# Patient Record
Sex: MALE | Race: WHITE | NOT HISPANIC OR LATINO | Employment: FULL TIME | URBAN - METROPOLITAN AREA
[De-identification: names, ages, dates, MRNs, and addresses within clinical notes are randomized per-mention and may not be internally consistent; named-entity substitution may affect disease eponyms.]

---

## 2023-03-13 ENCOUNTER — TELEPHONE (OUTPATIENT)
Dept: PRIMARY CARE | Facility: CLINIC | Age: 68
End: 2023-03-13

## 2023-03-13 DIAGNOSIS — M72.2 PLANTAR FASCIITIS: Primary | ICD-10-CM

## 2023-04-17 ENCOUNTER — HOSPITAL ENCOUNTER (OUTPATIENT)
Dept: DATA CONVERSION | Facility: HOSPITAL | Age: 68
End: 2023-04-17
Attending: ANESTHESIOLOGY | Admitting: ANESTHESIOLOGY
Payer: COMMERCIAL

## 2023-04-17 DIAGNOSIS — M17.11 UNILATERAL PRIMARY OSTEOARTHRITIS, RIGHT KNEE: ICD-10-CM

## 2023-04-17 DIAGNOSIS — M54.50 LOW BACK PAIN, UNSPECIFIED: ICD-10-CM

## 2023-04-17 DIAGNOSIS — M47.816 SPONDYLOSIS WITHOUT MYELOPATHY OR RADICULOPATHY, LUMBAR REGION: ICD-10-CM

## 2023-05-16 ENCOUNTER — AMBULATORY SURGICAL CENTER (OUTPATIENT)
Dept: URBAN - METROPOLITAN AREA SURGERY 5 | Facility: SURGERY | Age: 68
End: 2023-05-16
Payer: COMMERCIAL

## 2023-05-16 ENCOUNTER — OFFICE (OUTPATIENT)
Dept: URBAN - METROPOLITAN AREA PATHOLOGY 1 | Facility: PATHOLOGY | Age: 68
End: 2023-05-16
Payer: COMMERCIAL

## 2023-05-16 VITALS
SYSTOLIC BLOOD PRESSURE: 101 MMHG | DIASTOLIC BLOOD PRESSURE: 54 MMHG | OXYGEN SATURATION: 93 % | DIASTOLIC BLOOD PRESSURE: 90 MMHG | SYSTOLIC BLOOD PRESSURE: 118 MMHG | DIASTOLIC BLOOD PRESSURE: 72 MMHG | DIASTOLIC BLOOD PRESSURE: 80 MMHG | OXYGEN SATURATION: 100 % | HEART RATE: 70 BPM | DIASTOLIC BLOOD PRESSURE: 80 MMHG | DIASTOLIC BLOOD PRESSURE: 82 MMHG | SYSTOLIC BLOOD PRESSURE: 120 MMHG | HEART RATE: 75 BPM | OXYGEN SATURATION: 96 % | DIASTOLIC BLOOD PRESSURE: 72 MMHG | RESPIRATION RATE: 18 BRPM | HEART RATE: 68 BPM | DIASTOLIC BLOOD PRESSURE: 90 MMHG | OXYGEN SATURATION: 91 % | RESPIRATION RATE: 16 BRPM | WEIGHT: 205 LBS | OXYGEN SATURATION: 97 % | SYSTOLIC BLOOD PRESSURE: 117 MMHG | OXYGEN SATURATION: 95 % | HEART RATE: 76 BPM | RESPIRATION RATE: 15 BRPM | DIASTOLIC BLOOD PRESSURE: 56 MMHG | HEART RATE: 81 BPM | OXYGEN SATURATION: 97 % | HEART RATE: 79 BPM | HEART RATE: 70 BPM | OXYGEN SATURATION: 100 % | HEART RATE: 72 BPM | DIASTOLIC BLOOD PRESSURE: 79 MMHG | TEMPERATURE: 97.2 F | SYSTOLIC BLOOD PRESSURE: 71 MMHG | OXYGEN SATURATION: 96 % | HEART RATE: 75 BPM | DIASTOLIC BLOOD PRESSURE: 85 MMHG | OXYGEN SATURATION: 95 % | SYSTOLIC BLOOD PRESSURE: 101 MMHG | SYSTOLIC BLOOD PRESSURE: 115 MMHG | SYSTOLIC BLOOD PRESSURE: 120 MMHG | DIASTOLIC BLOOD PRESSURE: 82 MMHG | DIASTOLIC BLOOD PRESSURE: 79 MMHG | RESPIRATION RATE: 15 BRPM | TEMPERATURE: 97.2 F | DIASTOLIC BLOOD PRESSURE: 54 MMHG | OXYGEN SATURATION: 91 % | HEART RATE: 76 BPM | SYSTOLIC BLOOD PRESSURE: 118 MMHG | HEART RATE: 81 BPM | HEART RATE: 79 BPM | RESPIRATION RATE: 16 BRPM | HEART RATE: 72 BPM | SYSTOLIC BLOOD PRESSURE: 117 MMHG | RESPIRATION RATE: 18 BRPM | DIASTOLIC BLOOD PRESSURE: 85 MMHG | SYSTOLIC BLOOD PRESSURE: 115 MMHG | OXYGEN SATURATION: 94 % | SYSTOLIC BLOOD PRESSURE: 71 MMHG | DIASTOLIC BLOOD PRESSURE: 56 MMHG | OXYGEN SATURATION: 93 % | OXYGEN SATURATION: 94 % | HEART RATE: 68 BPM | WEIGHT: 205 LBS

## 2023-05-16 DIAGNOSIS — D12.3 BENIGN NEOPLASM OF TRANSVERSE COLON: ICD-10-CM

## 2023-05-16 DIAGNOSIS — D12.2 BENIGN NEOPLASM OF ASCENDING COLON: ICD-10-CM

## 2023-05-16 DIAGNOSIS — Z80.0 FAMILY HISTORY OF MALIGNANT NEOPLASM OF DIGESTIVE ORGANS: ICD-10-CM

## 2023-05-16 PROBLEM — K63.5 POLYP OF COLON: Status: ACTIVE | Noted: 2023-05-16

## 2023-05-16 PROCEDURE — 45385 COLONOSCOPY W/LESION REMOVAL: CPT | Mod: 33 | Performed by: INTERNAL MEDICINE

## 2023-05-16 PROCEDURE — 88305 TISSUE EXAM BY PATHOLOGIST: CPT | Performed by: PATHOLOGY

## 2023-05-19 LAB
PDF: PDF REPORT: (no result)
PDF: PDF REPORT: (no result)

## 2023-07-26 DIAGNOSIS — G47.00 INSOMNIA, UNSPECIFIED TYPE: Primary | ICD-10-CM

## 2023-07-26 DIAGNOSIS — M54.50 LOW BACK PAIN, UNSPECIFIED BACK PAIN LATERALITY, UNSPECIFIED CHRONICITY, UNSPECIFIED WHETHER SCIATICA PRESENT: Primary | ICD-10-CM

## 2023-07-26 RX ORDER — METHYLPREDNISOLONE 4 MG/1
TABLET ORAL
Qty: 21 TABLET | Refills: 0 | Status: SHIPPED | OUTPATIENT
Start: 2023-07-26 | End: 2023-07-26

## 2023-07-26 RX ORDER — ZOLPIDEM TARTRATE 10 MG/1
10 TABLET ORAL NIGHTLY PRN
COMMUNITY
Start: 2017-02-08 | End: 2023-07-26 | Stop reason: SDUPTHER

## 2023-07-26 RX ORDER — ZOLPIDEM TARTRATE 10 MG/1
10 TABLET ORAL NIGHTLY PRN
Qty: 30 TABLET | Refills: 3 | Status: SHIPPED | OUTPATIENT
Start: 2023-07-26 | End: 2023-07-26

## 2023-08-21 ENCOUNTER — APPOINTMENT (OUTPATIENT)
Dept: PRIMARY CARE | Facility: CLINIC | Age: 68
End: 2023-08-21
Payer: COMMERCIAL

## 2023-09-07 VITALS — HEIGHT: 73 IN | BODY MASS INDEX: 29.89 KG/M2

## 2023-09-14 ENCOUNTER — APPOINTMENT (OUTPATIENT)
Dept: PRIMARY CARE | Facility: CLINIC | Age: 68
End: 2023-09-14
Payer: MEDICARE

## 2023-09-23 LAB
BASOPHILS (10*3/UL) IN BLOOD BY AUTOMATED COUNT: 0.04 X10E9/L (ref 0–0.1)
BASOPHILS/100 LEUKOCYTES IN BLOOD BY AUTOMATED COUNT: 0.4 % (ref 0–2)
EOSINOPHILS (10*3/UL) IN BLOOD BY AUTOMATED COUNT: 0.14 X10E9/L (ref 0–0.7)
EOSINOPHILS/100 LEUKOCYTES IN BLOOD BY AUTOMATED COUNT: 1.5 % (ref 0–6)
ERYTHROCYTE DISTRIBUTION WIDTH (RATIO) BY AUTOMATED COUNT: 12.5 % (ref 11.5–14.5)
ERYTHROCYTE MEAN CORPUSCULAR HEMOGLOBIN CONCENTRATION (G/DL) BY AUTOMATED: 32.4 G/DL (ref 32–36)
ERYTHROCYTE MEAN CORPUSCULAR VOLUME (FL) BY AUTOMATED COUNT: 89 FL (ref 80–100)
ERYTHROCYTES (10*6/UL) IN BLOOD BY AUTOMATED COUNT: 5 X10E12/L (ref 4.5–5.9)
HEMATOCRIT (%) IN BLOOD BY AUTOMATED COUNT: 44.7 % (ref 41–52)
HEMOGLOBIN (G/DL) IN BLOOD: 14.5 G/DL (ref 13.5–17.5)
IMMATURE GRANULOCYTES/100 LEUKOCYTES IN BLOOD BY AUTOMATED COUNT: 0.4 % (ref 0–0.9)
LEUKOCYTES (10*3/UL) IN BLOOD BY AUTOMATED COUNT: 9.6 X10E9/L (ref 4.4–11.3)
LYMPHOCYTES (10*3/UL) IN BLOOD BY AUTOMATED COUNT: 2.37 X10E9/L (ref 1.2–4.8)
LYMPHOCYTES/100 LEUKOCYTES IN BLOOD BY AUTOMATED COUNT: 24.6 % (ref 13–44)
MONOCYTES (10*3/UL) IN BLOOD BY AUTOMATED COUNT: 0.64 X10E9/L (ref 0.1–1)
MONOCYTES/100 LEUKOCYTES IN BLOOD BY AUTOMATED COUNT: 6.6 % (ref 2–10)
NEUTROPHILS (10*3/UL) IN BLOOD BY AUTOMATED COUNT: 6.41 X10E9/L (ref 1.2–7.7)
NEUTROPHILS/100 LEUKOCYTES IN BLOOD BY AUTOMATED COUNT: 66.5 % (ref 40–80)
NRBC (PER 100 WBCS) BY AUTOMATED COUNT: 0 /100 WBC (ref 0–0)
PLATELETS (10*3/UL) IN BLOOD AUTOMATED COUNT: 188 X10E9/L (ref 150–450)

## 2023-10-05 ENCOUNTER — PHARMACY VISIT (OUTPATIENT)
Dept: PHARMACY | Facility: CLINIC | Age: 68
End: 2023-10-05
Payer: COMMERCIAL

## 2023-10-05 ENCOUNTER — OFFICE VISIT (OUTPATIENT)
Dept: PRIMARY CARE | Facility: CLINIC | Age: 68
End: 2023-10-05
Payer: COMMERCIAL

## 2023-10-05 VITALS
HEIGHT: 72 IN | SYSTOLIC BLOOD PRESSURE: 132 MMHG | DIASTOLIC BLOOD PRESSURE: 86 MMHG | WEIGHT: 230 LBS | BODY MASS INDEX: 31.15 KG/M2

## 2023-10-05 DIAGNOSIS — Z00.00 HEALTH CARE MAINTENANCE: Primary | ICD-10-CM

## 2023-10-05 DIAGNOSIS — R73.03 PRE-DIABETES: ICD-10-CM

## 2023-10-05 DIAGNOSIS — I10 PRIMARY HYPERTENSION: ICD-10-CM

## 2023-10-05 DIAGNOSIS — Z00.00 ENCOUNTER FOR PREVENTIVE HEALTH EXAMINATION: ICD-10-CM

## 2023-10-05 DIAGNOSIS — H61.22 IMPACTED CERUMEN OF LEFT EAR: ICD-10-CM

## 2023-10-05 DIAGNOSIS — Z00.00 HEALTHCARE MAINTENANCE: ICD-10-CM

## 2023-10-05 DIAGNOSIS — Z12.5 SCREENING FOR PROSTATE CANCER: ICD-10-CM

## 2023-10-05 DIAGNOSIS — G47.00 INSOMNIA, UNSPECIFIED TYPE: ICD-10-CM

## 2023-10-05 PROCEDURE — 3079F DIAST BP 80-89 MM HG: CPT | Performed by: STUDENT IN AN ORGANIZED HEALTH CARE EDUCATION/TRAINING PROGRAM

## 2023-10-05 PROCEDURE — RXMED WILLOW AMBULATORY MEDICATION CHARGE

## 2023-10-05 PROCEDURE — 69209 REMOVE IMPACTED EAR WAX UNI: CPT | Performed by: STUDENT IN AN ORGANIZED HEALTH CARE EDUCATION/TRAINING PROGRAM

## 2023-10-05 PROCEDURE — 1036F TOBACCO NON-USER: CPT | Performed by: STUDENT IN AN ORGANIZED HEALTH CARE EDUCATION/TRAINING PROGRAM

## 2023-10-05 PROCEDURE — 99397 PER PM REEVAL EST PAT 65+ YR: CPT | Performed by: STUDENT IN AN ORGANIZED HEALTH CARE EDUCATION/TRAINING PROGRAM

## 2023-10-05 PROCEDURE — 3075F SYST BP GE 130 - 139MM HG: CPT | Performed by: STUDENT IN AN ORGANIZED HEALTH CARE EDUCATION/TRAINING PROGRAM

## 2023-10-05 RX ORDER — HYDROCHLOROTHIAZIDE 12.5 MG/1
12.5 TABLET ORAL DAILY
Qty: 90 TABLET | Refills: 1 | Status: SHIPPED | OUTPATIENT
Start: 2023-10-05 | End: 2024-04-02

## 2023-10-05 RX ORDER — FLUTICASONE PROPIONATE 50 MCG
2 SPRAY, SUSPENSION (ML) NASAL DAILY
COMMUNITY
Start: 2015-05-22 | End: 2024-06-07 | Stop reason: SDUPTHER

## 2023-10-05 RX ORDER — ZOLPIDEM TARTRATE 10 MG/1
10 TABLET ORAL NIGHTLY PRN
Qty: 30 TABLET | Refills: 3 | Status: SHIPPED | OUTPATIENT
Start: 2023-10-05 | End: 2024-02-27 | Stop reason: SDUPTHER

## 2023-10-05 NOTE — PROGRESS NOTES
Subjective   Patient ID: Robbie Tovar is a 68 y.o. male who presents for Follow-up.    HPI   I have personally reviewed the [place] report for ROBBIE TOVAR. I have considered the risks of abuse, dependence, addiction and diversion.   Is the patient prescribed a combination of a benzodiazepine and opioid? No.   Last urine drug screening date/ordered today: 10/5/2022   Results of last screen: Results as expected.   Controlled Substance Agreement:   I have printed this form and reviewed each line item with the patient and the patient has verbalized understanding.   Date of the last Controlled Substance Agreement: 10/5/2022  SLEEP AIDS   What is the patient's goal of therapy? restful sleep.  Is this being achieved with current treatment? yes.  Activities of Daily Living:   Yes, it is my opinion that this patient is benefitting from sleep aid therapy .   Physical functioning: Same   Family relationships: Same   Social relationships: Same   Mood: Same   Sleep patterns: Same   Overall functioning: Same     Yearly physical.    He generally feels well.    Exercising regularly. Diet has not been as good recently due to [employer] EMR launch.    He gets mild intermittent swelling in his feet, interested in trying a diuretic for this.    He has decreased hearing in his left ear, thinks he has a cerumen impaction.  Review of Systems  12-point ROS reviewed and was negative unless otherwise noted in HPI.    Objective   /86   Ht 1.829 m (6')   Wt 104 kg (230 lb)   BMI 31.19 kg/m²     Physical Exam  GEN: conversant, NAD  HEENT: PERRL, EOMI, MMM, right TM clear, left TM occluded by cerumen  NECK: supple, no carotid bruits appreciated b/l  CV: S1, S2, RRR  PULM: CTAB  ABD: soft, NT, ND  NEURO: no new gross focal deficits  EXT: no sig LE edema  PSYCH: appropriate affect    Assessment/Plan       #HTN/leg swelling: start hydrochlorothiazide, dicussed SE profile. Check CMP 3 days after starting.     #Cerumen impaction: left. Successful  ear lavage performed today with subsequent intact TM. Patient tolerated procedure well. R/b/a were discussed with patient who opted to proceed with procedure.     #Insomnia: Continue Ambien. Patient says he has been stable for many years. He is resistant to stopping this medication or trying another medication. I did provide refill of #30 tabs with 3 refills. Controlled substance contract and UDS 10/2023. I personally reviewed OARRS.     #obesity: Encouraged continued efforts at , weight loss. Offered support.     #GERD: Takes PPI.     #IBS-D: uses bentyl PRN     #Pre-diabetes: weight loss, diet modifications, check A1c     #BCC: referral to dermatology for further management and interested in skin tag management.      #Health maintenance: He gets yearly flu shot. He declines TDaP. Pneumovax given 2022. Colonoscopy has been ordered, encouraged him to schedule. Advised Shingrix. Received COVID-19 vaccines and yearly flu shot.     RTC 3-6 mo

## 2023-10-08 ENCOUNTER — TRANSCRIBE ORDERS (OUTPATIENT)
Dept: PAIN MEDICINE | Facility: CLINIC | Age: 68
End: 2023-10-08
Payer: COMMERCIAL

## 2023-10-08 DIAGNOSIS — M47.816 LUMBAR SPONDYLOSIS: ICD-10-CM

## 2023-10-09 ENCOUNTER — PREP FOR PROCEDURE (OUTPATIENT)
Dept: PAIN MEDICINE | Facility: CLINIC | Age: 68
End: 2023-10-09

## 2023-10-09 ENCOUNTER — HOSPITAL ENCOUNTER (OUTPATIENT)
Dept: PAIN MEDICINE | Facility: CLINIC | Age: 68
Discharge: HOME | End: 2023-10-09
Payer: COMMERCIAL

## 2023-10-09 ENCOUNTER — APPOINTMENT (OUTPATIENT)
Dept: PAIN MEDICINE | Facility: CLINIC | Age: 68
End: 2023-10-09
Payer: COMMERCIAL

## 2023-10-09 ENCOUNTER — ANCILLARY PROCEDURE (OUTPATIENT)
Dept: RADIOLOGY | Facility: CLINIC | Age: 68
End: 2023-10-09
Payer: COMMERCIAL

## 2023-10-09 VITALS
OXYGEN SATURATION: 97 % | HEART RATE: 70 BPM | TEMPERATURE: 96.1 F | RESPIRATION RATE: 16 BRPM | DIASTOLIC BLOOD PRESSURE: 75 MMHG | SYSTOLIC BLOOD PRESSURE: 136 MMHG

## 2023-10-09 VITALS — RESPIRATION RATE: 18 BRPM | HEART RATE: 58 BPM | OXYGEN SATURATION: 98 %

## 2023-10-09 DIAGNOSIS — M47.816 LUMBAR SPONDYLOSIS: ICD-10-CM

## 2023-10-09 PROCEDURE — 2500000005 HC RX 250 GENERAL PHARMACY W/O HCPCS

## 2023-10-09 PROCEDURE — 7100000009 HC PHASE TWO TIME - INITIAL BASE CHARGE: Performed by: ANESTHESIOLOGY

## 2023-10-09 PROCEDURE — 64493 INJ PARAVERT F JNT L/S 1 LEV: CPT | Mod: LT

## 2023-10-09 PROCEDURE — 64493 INJ PARAVERT F JNT L/S 1 LEV: CPT | Performed by: ANESTHESIOLOGY

## 2023-10-09 PROCEDURE — 64494 INJ PARAVERT F JNT L/S 2 LEV: CPT | Performed by: ANESTHESIOLOGY

## 2023-10-09 PROCEDURE — 2500000004 HC RX 250 GENERAL PHARMACY W/ HCPCS (ALT 636 FOR OP/ED)

## 2023-10-09 PROCEDURE — 77003 FLUOROGUIDE FOR SPINE INJECT: CPT

## 2023-10-09 PROCEDURE — 7100000010 HC PHASE TWO TIME - EACH INCREMENTAL 1 MINUTE: Performed by: ANESTHESIOLOGY

## 2023-10-09 PROCEDURE — 64494 INJ PARAVERT F JNT L/S 2 LEV: CPT | Mod: LT

## 2023-10-09 RX ORDER — BUPIVACAINE HYDROCHLORIDE 7.5 MG/ML
INJECTION, SOLUTION EPIDURAL; RETROBULBAR
Status: COMPLETED
Start: 2023-10-09 | End: 2023-10-09

## 2023-10-09 RX ORDER — BUPIVACAINE HYDROCHLORIDE 7.5 MG/ML
INJECTION, SOLUTION EPIDURAL; RETROBULBAR
Status: DISPENSED
Start: 2023-10-09 | End: 2023-10-09

## 2023-10-09 RX ORDER — LIDOCAINE HYDROCHLORIDE 5 MG/ML
INJECTION, SOLUTION INFILTRATION; INTRAVENOUS
Status: COMPLETED
Start: 2023-10-09 | End: 2023-10-09

## 2023-10-09 RX ORDER — LIDOCAINE HYDROCHLORIDE 5 MG/ML
INJECTION, SOLUTION INFILTRATION; INTRAVENOUS
Status: DISPENSED
Start: 2023-10-09 | End: 2023-10-09

## 2023-10-09 RX ORDER — TRIAMCINOLONE ACETONIDE 40 MG/ML
INJECTION, SUSPENSION INTRA-ARTICULAR; INTRAMUSCULAR
Status: DISPENSED
Start: 2023-10-09 | End: 2023-10-09

## 2023-10-09 RX ORDER — TRIAMCINOLONE ACETONIDE 40 MG/ML
INJECTION, SUSPENSION INTRA-ARTICULAR; INTRAMUSCULAR
Status: COMPLETED
Start: 2023-10-09 | End: 2023-10-09

## 2023-10-09 RX ADMIN — TRIAMCINOLONE ACETONIDE 40 MG: 40 INJECTION, SUSPENSION INTRA-ARTICULAR; INTRAMUSCULAR at 14:28

## 2023-10-09 RX ADMIN — LIDOCAINE HYDROCHLORIDE 250 MG: 5 INJECTION, SOLUTION INFILTRATION at 14:28

## 2023-10-09 RX ADMIN — BUPIVACAINE HYDROCHLORIDE 75 MG: 7.5 INJECTION, SOLUTION EPIDURAL; RETROBULBAR at 14:28

## 2023-10-09 ASSESSMENT — COLUMBIA-SUICIDE SEVERITY RATING SCALE - C-SSRS
1. IN THE PAST MONTH, HAVE YOU WISHED YOU WERE DEAD OR WISHED YOU COULD GO TO SLEEP AND NOT WAKE UP?: NO
6. HAVE YOU EVER DONE ANYTHING, STARTED TO DO ANYTHING, OR PREPARED TO DO ANYTHING TO END YOUR LIFE?: NO
2. HAVE YOU ACTUALLY HAD ANY THOUGHTS OF KILLING YOURSELF?: NO

## 2023-10-09 ASSESSMENT — PAIN SCALES - GENERAL
PAINLEVEL_OUTOF10: 8
PAINLEVEL_OUTOF10: 0 - NO PAIN

## 2023-10-09 ASSESSMENT — PAIN DESCRIPTION - DESCRIPTORS: DESCRIPTORS: ACHING

## 2023-10-09 ASSESSMENT — PAIN - FUNCTIONAL ASSESSMENT: PAIN_FUNCTIONAL_ASSESSMENT: 0-10

## 2023-10-09 NOTE — OP NOTE
PROCEDURE: Left L4-5 and L5-S1 lumbar Facet Joint Injection    ESTIMATED BLOOD LOSS:  None.    COMPLICATIONS:  none    INDICATIONS: This  patient has a significant history of left lower back pain.  Physical exam and radiological findings correlate with the pre-operative diagnoses.  Because of these findings we have elected to proceed with Lumbar facet joint injection at the left L4-5 and L5-S1 facet    PROCEDURE: After sufficient consent was signed, the patient was brought to the Operating Room and was placed in the prone position with a pillow underneath the hips. The back was prepped and draped in the usual sterile fashion.     Under fluoroscopic guidance the L4-5 interspace was identified. On an oblique view, the left L4-5 facet joint was identified. The skin at the entry site was infiltrated with 2% Lidocaine with Bicarb using a Size #25-gauge needle. A Size #22-gauge 3.5 inch spinal needle was introduced gradually until the joint was encountered. The needle was advanced into the facet joint. Next, 0.5 cc of Omnipaque 300 was injected and showed excellent distribution of the dye into the joint.  At that time, 20 mg of Kenalog plus 1 cc of  0.75% Bupivacaine were injected. The needle was flushed and removed.      The same procedure technique and medication dosages were repeated at the left L5-S1 joints.     The patient tolerated the procedure very well and was transferred to the Recovery Room in stable condition.  The patient had excellent resolution of symptoms.  Patient to follow-up in the Pain Management Clinic as scheduled.

## 2023-10-23 DIAGNOSIS — M43.06 LUMBAR SPONDYLOLYSIS: Primary | ICD-10-CM

## 2023-10-31 DIAGNOSIS — M54.16 LUMBAR RADICULOPATHY: ICD-10-CM

## 2023-10-31 DIAGNOSIS — M47.817 LUMBOSACRAL SPONDYLOSIS WITHOUT MYELOPATHY: Primary | ICD-10-CM

## 2023-11-01 ENCOUNTER — ANCILLARY PROCEDURE (OUTPATIENT)
Dept: RADIOLOGY | Facility: CLINIC | Age: 68
End: 2023-11-01
Payer: COMMERCIAL

## 2023-11-01 DIAGNOSIS — M47.817 LUMBOSACRAL SPONDYLOSIS WITHOUT MYELOPATHY: ICD-10-CM

## 2023-11-01 DIAGNOSIS — M54.16 LUMBAR RADICULOPATHY: ICD-10-CM

## 2023-11-01 PROCEDURE — 72148 MRI LUMBAR SPINE W/O DYE: CPT

## 2023-11-01 PROCEDURE — 72148 MRI LUMBAR SPINE W/O DYE: CPT | Performed by: RADIOLOGY

## 2023-11-01 RX ORDER — HYDROMORPHONE HYDROCHLORIDE 1 MG/ML
INJECTION, SOLUTION INTRAMUSCULAR; INTRAVENOUS; SUBCUTANEOUS
Status: DISPENSED
Start: 2023-11-01 | End: 2023-11-01

## 2023-11-01 RX ORDER — MIDAZOLAM HYDROCHLORIDE 1 MG/ML
INJECTION INTRAMUSCULAR; INTRAVENOUS
Status: DISPENSED
Start: 2023-11-01 | End: 2023-11-01

## 2023-11-01 RX ORDER — BUPIVACAINE HYDROCHLORIDE 5 MG/ML
INJECTION, SOLUTION EPIDURAL; INTRACAUDAL
Status: DISPENSED
Start: 2023-11-01 | End: 2023-11-01

## 2023-11-01 RX ORDER — LIDOCAINE HYDROCHLORIDE 10 MG/ML
INJECTION INFILTRATION; PERINEURAL
Status: DISPENSED
Start: 2023-11-01 | End: 2023-11-01

## 2023-11-01 RX ORDER — MIDAZOLAM HYDROCHLORIDE 1 MG/ML
INJECTION INTRAMUSCULAR; INTRAVENOUS
Status: DISPENSED
Start: 2023-11-01 | End: 2023-11-02

## 2023-11-01 RX ORDER — HYDROMORPHONE HYDROCHLORIDE 1 MG/ML
INJECTION, SOLUTION INTRAMUSCULAR; INTRAVENOUS; SUBCUTANEOUS
Status: DISPENSED
Start: 2023-11-01 | End: 2023-11-02

## 2023-11-09 ENCOUNTER — PHARMACY VISIT (OUTPATIENT)
Dept: PHARMACY | Facility: CLINIC | Age: 68
End: 2023-11-09
Payer: COMMERCIAL

## 2023-11-09 PROCEDURE — RXMED WILLOW AMBULATORY MEDICATION CHARGE

## 2023-11-11 PROBLEM — M54.12 CERVICAL RADICULOPATHY: Status: ACTIVE | Noted: 2023-11-11

## 2023-11-11 PROBLEM — L82.1 OTHER SEBORRHEIC KERATOSIS: Status: ACTIVE | Noted: 2023-01-11

## 2023-11-11 PROBLEM — J34.89 NASAL CRUSTING: Status: ACTIVE | Noted: 2023-11-11

## 2023-11-11 PROBLEM — R23.3 PETECHIAE: Status: ACTIVE | Noted: 2023-11-11

## 2023-11-11 PROBLEM — G47.00 INSOMNIA: Status: ACTIVE | Noted: 2023-11-11

## 2023-11-11 PROBLEM — M47.816 LUMBAR SPONDYLOSIS: Status: ACTIVE | Noted: 2023-11-11

## 2023-11-11 PROBLEM — M17.12 ARTHRITIS OF KNEE, LEFT: Status: ACTIVE | Noted: 2023-11-11

## 2023-11-11 PROBLEM — K13.0 DISEASES OF LIPS: Status: ACTIVE | Noted: 2023-01-11

## 2023-11-11 PROBLEM — Z96.651 PRESENCE OF RIGHT ARTIFICIAL KNEE JOINT: Status: ACTIVE | Noted: 2021-06-24

## 2023-11-11 PROBLEM — J30.9 ALLERGIC RHINITIS: Status: ACTIVE | Noted: 2023-11-11

## 2023-11-11 PROBLEM — M25.661 DECREASED RANGE OF MOTION OF RIGHT KNEE: Status: ACTIVE | Noted: 2023-11-11

## 2023-11-11 PROBLEM — E55.9 VITAMIN D DEFICIENCY: Status: ACTIVE | Noted: 2023-11-11

## 2023-11-11 PROBLEM — R43.0 LOSS OF SMELL: Status: ACTIVE | Noted: 2023-11-11

## 2023-11-11 PROBLEM — L91.8 OTHER HYPERTROPHIC DISORDERS OF THE SKIN: Status: ACTIVE | Noted: 2023-01-11

## 2023-11-11 PROBLEM — I10 BENIGN ESSENTIAL HTN: Status: ACTIVE | Noted: 2023-11-11

## 2023-11-11 PROBLEM — Z74.09 IMPAIRED FUNCTIONAL MOBILITY, BALANCE, AND ENDURANCE: Status: ACTIVE | Noted: 2023-11-11

## 2023-11-11 PROBLEM — C44.519 BASAL CELL CARCINOMA OF SKIN OF OTHER PART OF TRUNK: Status: ACTIVE | Noted: 2023-01-11

## 2023-11-11 PROBLEM — Z85.828 PERSONAL HISTORY OF OTHER MALIGNANT NEOPLASM OF SKIN: Status: ACTIVE | Noted: 2023-01-11

## 2023-11-11 PROBLEM — L82.0 KERATOSIS, INFLAMED SEBORRHEIC: Status: ACTIVE | Noted: 2023-11-11

## 2023-11-11 PROBLEM — I89.0 LYMPHEDEMA OF LEG: Status: ACTIVE | Noted: 2023-11-11

## 2023-11-11 PROBLEM — M72.2 PLANTAR FASCIITIS: Status: ACTIVE | Noted: 2023-11-11

## 2023-11-11 PROBLEM — M17.11 ARTHRITIS OF KNEE, RIGHT: Status: ACTIVE | Noted: 2023-11-11

## 2023-11-11 PROBLEM — J32.9 CHRONIC SINUSITIS: Status: ACTIVE | Noted: 2023-11-11

## 2023-11-11 PROBLEM — R44.8 FACIAL PRESSURE: Status: ACTIVE | Noted: 2023-11-11

## 2023-11-11 PROBLEM — T84.82XS ARTHROFIBROSIS OF TOTAL KNEE ARTHROPLASTY, SEQUELA: Status: ACTIVE | Noted: 2023-11-11

## 2023-11-11 PROBLEM — M54.2 CERVICALGIA: Status: ACTIVE | Noted: 2023-11-11

## 2023-11-11 PROBLEM — D48.5 NEOPLASM OF UNCERTAIN BEHAVIOR OF SKIN: Status: ACTIVE | Noted: 2023-01-11

## 2023-11-11 PROBLEM — M54.50 CHRONIC LOW BACK PAIN: Status: ACTIVE | Noted: 2023-11-11

## 2023-11-11 PROBLEM — J34.89 NASAL DRAINAGE: Status: ACTIVE | Noted: 2023-11-11

## 2023-11-11 PROBLEM — R73.03 PRE-DIABETES: Status: ACTIVE | Noted: 2023-11-11

## 2023-11-11 PROBLEM — R20.2 PARESTHESIA: Status: ACTIVE | Noted: 2023-11-11

## 2023-11-11 PROBLEM — M17.9 OA (OSTEOARTHRITIS) OF KNEE: Status: ACTIVE | Noted: 2023-11-11

## 2023-11-11 PROBLEM — M54.41 CHRONIC MIDLINE LOW BACK PAIN WITH RIGHT-SIDED SCIATICA: Status: ACTIVE | Noted: 2023-11-11

## 2023-11-11 PROBLEM — E66.9 OBESITY: Status: ACTIVE | Noted: 2023-11-11

## 2023-11-11 PROBLEM — G89.29 CHRONIC LOW BACK PAIN: Status: ACTIVE | Noted: 2023-11-11

## 2023-11-11 PROBLEM — K21.9 GERD (GASTROESOPHAGEAL REFLUX DISEASE): Status: ACTIVE | Noted: 2023-11-11

## 2023-11-11 PROBLEM — B36.0 PITYRIASIS VERSICOLOR: Status: ACTIVE | Noted: 2023-01-11

## 2023-11-11 PROBLEM — J30.2 SEASONAL ALLERGIES: Status: ACTIVE | Noted: 2023-11-11

## 2023-11-11 PROBLEM — Z79.01 LONG TERM (CURRENT) USE OF ANTICOAGULANTS: Status: ACTIVE | Noted: 2021-06-24

## 2023-11-11 PROBLEM — E66.3 OVERWEIGHT: Status: ACTIVE | Noted: 2023-11-11

## 2023-11-11 RX ORDER — MELOXICAM 15 MG/1
15 TABLET ORAL
COMMUNITY
Start: 2021-09-08 | End: 2024-03-07 | Stop reason: WASHOUT

## 2023-11-11 RX ORDER — KETOROLAC TROMETHAMINE 10 MG/1
10 TABLET, FILM COATED ORAL 3 TIMES DAILY
COMMUNITY
Start: 2023-09-14 | End: 2024-03-07 | Stop reason: WASHOUT

## 2023-11-11 RX ORDER — PREDNISONE 10 MG/1
10 TABLET ORAL
COMMUNITY
Start: 2021-01-22 | End: 2024-03-07 | Stop reason: WASHOUT

## 2023-11-11 RX ORDER — METHOCARBAMOL 750 MG/1
750 TABLET, FILM COATED ORAL
COMMUNITY
Start: 2022-12-06 | End: 2024-03-07 | Stop reason: WASHOUT

## 2023-11-11 RX ORDER — CELECOXIB 200 MG/1
1 CAPSULE ORAL 2 TIMES DAILY PRN
COMMUNITY
Start: 2017-07-24 | End: 2023-11-30 | Stop reason: SDUPTHER

## 2023-11-11 RX ORDER — MONTELUKAST SODIUM 10 MG/1
10 TABLET ORAL
COMMUNITY
Start: 2014-12-02 | End: 2024-03-07 | Stop reason: WASHOUT

## 2023-11-11 RX ORDER — OXYCODONE HCL 10 MG/1
10 TABLET, FILM COATED, EXTENDED RELEASE ORAL EVERY 12 HOURS
COMMUNITY
Start: 2023-02-21 | End: 2024-03-07 | Stop reason: WASHOUT

## 2023-11-11 RX ORDER — AMLODIPINE BESYLATE 2.5 MG/1
2.5 TABLET ORAL
COMMUNITY
Start: 2021-06-18 | End: 2024-03-07 | Stop reason: WASHOUT

## 2023-11-11 RX ORDER — OXYCODONE HYDROCHLORIDE 5 MG/1
5 TABLET ORAL
COMMUNITY
Start: 2021-08-15 | End: 2024-03-07 | Stop reason: WASHOUT

## 2023-11-11 RX ORDER — PANTOPRAZOLE SODIUM 40 MG/1
40 TABLET, DELAYED RELEASE ORAL EVERY 24 HOURS
COMMUNITY
Start: 2022-06-23 | End: 2024-02-20 | Stop reason: SDUPTHER

## 2023-11-11 RX ORDER — METHYLPREDNISOLONE 4 MG/1
4 TABLET ORAL
COMMUNITY
Start: 2023-03-13 | End: 2024-03-07 | Stop reason: WASHOUT

## 2023-11-11 RX ORDER — ZINC GLUCONATE 50 MG
50 TABLET ORAL
COMMUNITY
Start: 2020-12-22 | End: 2024-03-07 | Stop reason: WASHOUT

## 2023-11-11 RX ORDER — OXYCODONE AND ACETAMINOPHEN 5; 325 MG/1; MG/1
TABLET ORAL
COMMUNITY
Start: 2022-11-07 | End: 2024-03-07 | Stop reason: WASHOUT

## 2023-11-11 RX ORDER — KETOCONAZOLE 20 MG/G
CREAM TOPICAL
COMMUNITY
Start: 2021-11-11 | End: 2024-03-07 | Stop reason: WASHOUT

## 2023-11-13 ENCOUNTER — ANCILLARY PROCEDURE (OUTPATIENT)
Dept: RADIOLOGY | Facility: CLINIC | Age: 68
End: 2023-11-13
Payer: COMMERCIAL

## 2023-11-13 ENCOUNTER — HOSPITAL ENCOUNTER (OUTPATIENT)
Dept: PAIN MEDICINE | Facility: CLINIC | Age: 68
Discharge: HOME | End: 2023-11-13
Payer: COMMERCIAL

## 2023-11-13 VITALS
DIASTOLIC BLOOD PRESSURE: 94 MMHG | SYSTOLIC BLOOD PRESSURE: 159 MMHG | OXYGEN SATURATION: 97 % | RESPIRATION RATE: 16 BRPM | HEART RATE: 84 BPM | TEMPERATURE: 96.4 F

## 2023-11-13 DIAGNOSIS — M47.816 LUMBAR SPONDYLOSIS: Primary | ICD-10-CM

## 2023-11-13 DIAGNOSIS — M43.06 LUMBAR SPONDYLOLYSIS: ICD-10-CM

## 2023-11-13 PROCEDURE — 64494 INJ PARAVERT F JNT L/S 2 LEV: CPT | Performed by: ANESTHESIOLOGY

## 2023-11-13 PROCEDURE — 64493 INJ PARAVERT F JNT L/S 1 LEV: CPT | Performed by: ANESTHESIOLOGY

## 2023-11-13 PROCEDURE — 2500000005 HC RX 250 GENERAL PHARMACY W/O HCPCS

## 2023-11-13 PROCEDURE — 64493 INJ PARAVERT F JNT L/S 1 LEV: CPT | Mod: 50 | Performed by: ANESTHESIOLOGY

## 2023-11-13 PROCEDURE — 77003 FLUOROGUIDE FOR SPINE INJECT: CPT

## 2023-11-13 PROCEDURE — 2500000004 HC RX 250 GENERAL PHARMACY W/ HCPCS (ALT 636 FOR OP/ED)

## 2023-11-13 RX ORDER — BUPIVACAINE HYDROCHLORIDE 7.5 MG/ML
INJECTION, SOLUTION EPIDURAL; RETROBULBAR
Status: COMPLETED
Start: 2023-11-13 | End: 2023-11-13

## 2023-11-13 RX ORDER — TRIAMCINOLONE ACETONIDE 40 MG/ML
INJECTION, SUSPENSION INTRA-ARTICULAR; INTRAMUSCULAR
Status: COMPLETED
Start: 2023-11-13 | End: 2023-11-13

## 2023-11-13 RX ORDER — LIDOCAINE HYDROCHLORIDE 5 MG/ML
INJECTION, SOLUTION INFILTRATION; INTRAVENOUS
Status: COMPLETED
Start: 2023-11-13 | End: 2023-11-13

## 2023-11-13 RX ADMIN — LIDOCAINE HYDROCHLORIDE 250 MG: 5 INJECTION, SOLUTION INFILTRATION at 08:48

## 2023-11-13 RX ADMIN — TRIAMCINOLONE ACETONIDE 80 MG: 40 INJECTION, SUSPENSION INTRA-ARTICULAR; INTRAMUSCULAR at 08:48

## 2023-11-13 RX ADMIN — BUPIVACAINE HYDROCHLORIDE 75 MG: 7.5 INJECTION, SOLUTION EPIDURAL; RETROBULBAR at 08:49

## 2023-11-13 ASSESSMENT — PAIN SCALES - GENERAL: PAINLEVEL_OUTOF10: 5 - MODERATE PAIN

## 2023-11-13 ASSESSMENT — PAIN - FUNCTIONAL ASSESSMENT: PAIN_FUNCTIONAL_ASSESSMENT: 0-10

## 2023-11-13 ASSESSMENT — COLUMBIA-SUICIDE SEVERITY RATING SCALE - C-SSRS
1. IN THE PAST MONTH, HAVE YOU WISHED YOU WERE DEAD OR WISHED YOU COULD GO TO SLEEP AND NOT WAKE UP?: NO
2. HAVE YOU ACTUALLY HAD ANY THOUGHTS OF KILLING YOURSELF?: NO
6. HAVE YOU EVER DONE ANYTHING, STARTED TO DO ANYTHING, OR PREPARED TO DO ANYTHING TO END YOUR LIFE?: NO

## 2023-11-13 ASSESSMENT — ENCOUNTER SYMPTOMS
ENDOCRINE NEGATIVE: 1
PSYCHIATRIC NEGATIVE: 1
ARTHRALGIAS: 1
RESPIRATORY NEGATIVE: 1
CARDIOVASCULAR NEGATIVE: 1
BACK PAIN: 1
HEMATOLOGIC/LYMPHATIC NEGATIVE: 1
GASTROINTESTINAL NEGATIVE: 1
EYES NEGATIVE: 1

## 2023-11-13 NOTE — H&P
History Of Present Illness  Robbie Tovar is a 68 y.o. male presenting with LBP no changes since 9/14/2023.     Past Medical History  Past Medical History:   Diagnosis Date    Chronic sinusitis, unspecified 09/21/2016    Clinical sinusitis    Plantar fascial fibromatosis 09/21/2016    Plantar fasciitis    Primary insomnia 09/21/2016    Primary insomnia       Surgical History  Past Surgical History:   Procedure Laterality Date    OTHER SURGICAL HISTORY  05/18/2021    Nasal septoplasty    OTHER SURGICAL HISTORY  05/18/2021    Tonsillectomy with adenoidectomy    OTHER SURGICAL HISTORY  05/12/2020    Hernia repair        Social History  He reports that he has never smoked. He has never used smokeless tobacco. He reports that he does not drink alcohol and does not use drugs.    Family History  Family History   Problem Relation Name Age of Onset    Allergies Mother      Other (cardiac disorder) Mother          stent 70s    Hypertension Mother      Hypertension Father      Allergies Sister          Allergies  Iodinated contrast media    Review of Systems   HENT: Negative.     Eyes: Negative.    Respiratory: Negative.     Cardiovascular: Negative.    Gastrointestinal: Negative.    Endocrine: Negative.    Genitourinary: Negative.    Musculoskeletal:  Positive for arthralgias and back pain.   Skin: Negative.    Hematological: Negative.    Psychiatric/Behavioral: Negative.          Physical Exam  Vitals and nursing note reviewed.   Constitutional:       Appearance: Normal appearance.   HENT:      Head: Normocephalic and atraumatic.      Nose: Nose normal.   Eyes:      Extraocular Movements: Extraocular movements intact.      Conjunctiva/sclera: Conjunctivae normal.      Pupils: Pupils are equal, round, and reactive to light.   Cardiovascular:      Rate and Rhythm: Normal rate and regular rhythm.      Pulses: Normal pulses.      Heart sounds: Normal heart sounds.   Pulmonary:      Effort: Pulmonary effort is normal.      Breath  sounds: Normal breath sounds.   Abdominal:      General: Abdomen is flat. Bowel sounds are normal.      Palpations: Abdomen is soft.   Musculoskeletal:         General: Tenderness present.   Skin:     General: Skin is warm.   Neurological:      Mental Status: He is alert.   Psychiatric:         Mood and Affect: Mood normal.         Behavior: Behavior normal.          Last Recorded Vitals  Blood pressure (!) 159/94, pulse 84, temperature 35.8 °C (96.4 °F), resp. rate 16, SpO2 97 %.    Relevant Results             Assessment/Plan   Active Problems:  There are no active Hospital Problems.      Plan for bilateral L3-5 MBB         Natalie Monzon MD

## 2023-11-13 NOTE — Clinical Note
Prepped with ChloraPrep, a minimum of 3 minute dry time, longer if needed, no pooling noted, patient draped in sterile fashion. Mid to lower back

## 2023-11-13 NOTE — OP NOTE
Patient has no changes in health medical management or allergies since last visit on 9/14/2023    PRE-OPERATIVE DIAGNOSIS: Lumbar spondylosis  POST-OPERATIVE DIAGNOSIS:  Same.    PROCEDURE:  bilateral L3, L4 and [id number] medial Branch Block     ESTIMATED BLOOD LOSS:  None.    COMPLICATIONS:  None    CLINICAL FINDINGS:  The patient is a pleasant 68 y.o.  male with significant history of bilateral lower back pain.  Clinical exam and radiological finding correlate with the pre-operative diagnoses.  Because of these findings we have elected to proceed with medial branch block at the affected levels.    DESCRIPTION OF PROCEDURE: After sufficient consent was signed, the patient was brought to the Operating Room, placed in the Prone/Supine position with the neck neutral with a pillow underneath the hips. The lower back was prepped and draped in the usual fashion.     Under fluoroscopic guidance, the right  L3 level was identified.  Utilizing the oblique view the medial branch area between the junction of the transverse process and superior articular process was identified. The skin was infiltrated with lidocaine, 0.5% using size 27-gauge needle. Then Spinal needle 22-gauge, 3.5 inchwas introduced gradually until the right  L3 the site of the medial branch was encountered; 0.5 cc of  bupivacaine, 0.75% + Kenalog 10 mg was injected.     The same procedure technique and medication dosages were repeated at the bilateral  L3, L4, and L5  level(s).     The patient tolerated the procedure very well and was transferred to the Recovery Room in stable condition.  The patient had stable resolution of symptoms.  Patient to follow-up in the Pain Management Clinic as scheduled.

## 2023-11-30 ENCOUNTER — PHARMACY VISIT (OUTPATIENT)
Dept: PHARMACY | Facility: CLINIC | Age: 68
End: 2023-11-30
Payer: COMMERCIAL

## 2023-11-30 DIAGNOSIS — M17.12 ARTHRITIS OF KNEE, LEFT: Primary | ICD-10-CM

## 2023-11-30 PROCEDURE — RXMED WILLOW AMBULATORY MEDICATION CHARGE

## 2023-11-30 RX ORDER — CELECOXIB 200 MG/1
200 CAPSULE ORAL 2 TIMES DAILY PRN
Qty: 60 CAPSULE | Refills: 2 | Status: SHIPPED | OUTPATIENT
Start: 2023-11-30 | End: 2024-02-27 | Stop reason: SDUPTHER

## 2024-01-03 ENCOUNTER — TELEPHONE (OUTPATIENT)
Dept: PRIMARY CARE | Facility: CLINIC | Age: 69
End: 2024-01-03
Payer: COMMERCIAL

## 2024-01-03 DIAGNOSIS — R10.9 ABDOMINAL DISCOMFORT: Primary | ICD-10-CM

## 2024-01-03 PROCEDURE — RXMED WILLOW AMBULATORY MEDICATION CHARGE

## 2024-01-03 RX ORDER — DICYCLOMINE HYDROCHLORIDE 10 MG/1
10 CAPSULE ORAL 4 TIMES DAILY PRN
Qty: 30 CAPSULE | Refills: 1 | Status: SHIPPED | OUTPATIENT
Start: 2024-01-03 | End: 2024-03-03

## 2024-01-04 ENCOUNTER — PHARMACY VISIT (OUTPATIENT)
Dept: PHARMACY | Facility: CLINIC | Age: 69
End: 2024-01-04
Payer: COMMERCIAL

## 2024-01-24 ENCOUNTER — OFFICE VISIT (OUTPATIENT)
Dept: PAIN MEDICINE | Facility: HOSPITAL | Age: 69
End: 2024-01-24
Payer: COMMERCIAL

## 2024-01-24 DIAGNOSIS — M47.816 LUMBAR SPONDYLOSIS: Primary | ICD-10-CM

## 2024-01-24 DIAGNOSIS — M17.12 PRIMARY OSTEOARTHRITIS OF LEFT KNEE: ICD-10-CM

## 2024-01-24 DIAGNOSIS — M46.1 SACROILIITIS (CMS-HCC): ICD-10-CM

## 2024-01-24 DIAGNOSIS — M21.70 ACQUIRED LEG LENGTH DISCREPANCY: ICD-10-CM

## 2024-01-24 PROCEDURE — 1036F TOBACCO NON-USER: CPT | Performed by: ANESTHESIOLOGY

## 2024-01-24 PROCEDURE — 20610 DRAIN/INJ JOINT/BURSA W/O US: CPT | Performed by: ANESTHESIOLOGY

## 2024-01-24 PROCEDURE — 99213 OFFICE O/P EST LOW 20 MIN: CPT | Performed by: ANESTHESIOLOGY

## 2024-01-24 PROCEDURE — 20610 DRAIN/INJ JOINT/BURSA W/O US: CPT | Mod: LT | Performed by: ANESTHESIOLOGY

## 2024-01-24 PROCEDURE — 1125F AMNT PAIN NOTED PAIN PRSNT: CPT | Performed by: ANESTHESIOLOGY

## 2024-01-24 PROCEDURE — 20610 DRAIN/INJ JOINT/BURSA W/O US: CPT | Performed by: STUDENT IN AN ORGANIZED HEALTH CARE EDUCATION/TRAINING PROGRAM

## 2024-01-24 PROCEDURE — 99203 OFFICE O/P NEW LOW 30 MIN: CPT | Performed by: ANESTHESIOLOGY

## 2024-01-24 ASSESSMENT — PAIN SCALES - GENERAL: PAINLEVEL_OUTOF10: 6

## 2024-01-24 ASSESSMENT — PAIN - FUNCTIONAL ASSESSMENT: PAIN_FUNCTIONAL_ASSESSMENT: 0-10

## 2024-01-24 NOTE — PROGRESS NOTES
History Of Present Illness  Robbie Tovar is a 68 y.o. male with a past medical history of hypertension, obesity, GERD, prediabetes presents to pain clinic for continued management of low back pain.  Patient normally follows with Dr. Shirley at which point lumbar medial branch blocks were done, he states he had greater than 50% relief of his pain with these injections, however he never had the radiofrequency ablation done.   He states he has low back pain primarily with prolonged sitting or standing.  He does not necessarily have pain with walking.  He does endorse a burning pain that, most on the lateral aspect of his thigh and around his knee.  But is not correlated with walking.  His MRI does show multilevel spondylosis with varying degrees of spinal canal neuroforaminal narrowing.  There are degenerative facet changes as well as flavum hypertrophy contributing to mild canal stenosis at the L4-5 level.  There are mild chronic compressive changes along the L3 vertebral body.  He is not taking any neuromodulating medications. The pain causes significant stress in the patient's life, specifically interferes with general activity, mood, walking ability, ability to perform tasks at home and/or work.  Patient participates in physical therapy and continues to perform physician directed exercises at home. Denies any bowel or bladder incontinence, saddle anesthesia, worsening pain, weakness or falls.     Past Medical History  He has a past medical history of Chronic sinusitis, unspecified (09/21/2016), Plantar fascial fibromatosis (09/21/2016), and Primary insomnia (09/21/2016).    Surgical History  He has a past surgical history that includes Other surgical history (05/18/2021); Other surgical history (05/18/2021); and Other surgical history (05/12/2020).     Social History  He reports that he has never smoked. He has never used smokeless tobacco. He reports that he does not drink alcohol and does not use drugs.    Family  History  Family History   Problem Relation Name Age of Onset    Allergies Mother      Other (cardiac disorder) Mother          stent 70s    Hypertension Mother      Hypertension Father      Allergies Sister          Allergies  Iodinated contrast media    Review of Symptoms:   Constitutional: Negative for chills, diaphoresis or fever  HENT: Negative for neck swelling  Eyes:.  Negative for eye pain  Respiratory:.  Negative for cough, shortness of breath or wheezing    Cardiovascular:.  Negative for chest pain or palpitations  Gastrointestinal:.  Negative for abdominal pain, nausea and vomiting  Genitourinary:.  Negative for urgency  Musculoskeletal:  Positive for back pain. Positive for joint pain. Denies falls within the past 3 months.  Skin: Negative for wounds or itching   Neurological: Negative for dizziness, seizures, loss of consciousness and weakness  Endo/Heme/Allergies: Does not bruise/bleed easily  Psychiatric/Behavioral: Negative for depression. The patient does not appear anxious.       PHYSICAL EXAM  Vitals signs reviewed  Constitutional:       General: Not in acute distress     Appearance: Normal appearance. Not ill-appearing.  HENT:     Head: Normocephalic and atraumatic  Eyes:     Conjunctiva/sclera: Conjunctivae normal  Cardiovascular:     Rate and Rhythm: Normal rate and regular rhythm  Pulmonary:     Effort: No respiratory distress  Abdominal:     Palpations: Abdomen is soft  Musculoskeletal: RANGEL  Skin:     General: Skin is warm and dry  Neurological:     General: No focal deficit present  Psychiatric:         Mood and Affect: Mood normal         Behavior: Behavior normal    Advanced Exam   Inspection: No gross deformities, no surgical scars, leg length discrepancy noted   Palpation: No tenderness of patient of lumbar midline, lumbar paraspinals, tender over left SI joint  ROM: Normal range of motion of the lumbar flexion extension, limited range of motion with right knee replacement  Motor: 5-5  strength upper and lower extremities  Sensory: Negative for sensory abnormalities in upper and lower extremities  Reflexes: 2+ reflexes bilateral upper and lower extremities  Lumbar: Negative straight leg raising bilaterally, negative for facet loading  Sacral: Positive Janet, positive Gaenslen's on left  Hip: Negative for pain with anterior, lateral, posterior palpation of hip joints, negative FADIR, negative for internal/external rotation of the hip, negative logroll     Last Recorded Vitals  There were no vitals taken for this visit.    Relevant Results  Current Outpatient Medications   Medication Instructions    amLODIPine (NORVASC) 2.5 mg, oral, Daily RT    celecoxib (CeleBREX) 200 mg capsule Take 1 capsule (200 mg) by mouth 2 times a day as needed for mild pain (1 - 3)    ciclopirox (Loprox) 0.77 % cream APPLY 1 APPLICATION TO AFFECTED AREA TWICE DAILY.    dicyclomine (BENTYL) 10 mg, oral, 4 times daily PRN    fluticasone (Flonase) 50 mcg/actuation nasal spray 2 sprays, Each Nostril, Daily    hydroCHLOROthiazide (HYDRODIURIL) 12.5 mg, oral, Daily    hydrocortisone 2.5 % cream APPLY TO AFFECTED AREA(S) OF ANGLES OF MOUTH (MIXED WITH KETOCONAZOLE CREAM) TWO TIMES A DAY FOR 2 WEEKS. REPEAT AS NEEDED FLARE    ketoconazole (NIZOral) 2 % cream Topical    ketorolac (TORADOL) 10 mg, oral, 3 times daily    meloxicam (MOBIC) 15 mg, oral    methocarbamol (ROBAXIN) 750 mg, oral    methylPREDNISolone (MEDROL DOSPAK) 4 mg, oral    montelukast (SINGULAIR) 10 mg, oral    oxyCODONE (ROXICODONE) 5 mg, oral    oxyCODONE ER (OXYCONTIN) 10 mg, oral, Every 12 hours    oxyCODONE-acetaminophen (Percocet) 5-325 mg tablet oral    pantoprazole (PROTONIX) 40 mg, oral, Every 24 hours    predniSONE (DELTASONE) 10 mg, oral    zinc gluconate 50 mg, oral, Daily RT    zolpidem (Ambien) 10 mg tablet TAKE 1 TABLET (10 MG) BY MOUTH AS NEEDED AT BEDTIME FOR SLEEP.         MR lumbar spine wo IV contrast 11/01/2023    Narrative  Interpreted By:   Godwin London,  STUDY:  MR LUMBAR SPINE WO IV CONTRAST;  11/1/2023 10:24 am    INDICATION:  Signs/Symptoms:Progressive lower back pain with now some radiation to  extremities elevation for possible interventional therapies surgery.    COMPARISON:  None.    ACCESSION NUMBER(S):  [id number]    ORDERING CLINICIAN:  DALTON SIU    TECHNIQUE:  Sagittal STIR, sagittal T2, sagittal T1, axial T2, axial T1 weighted  MRI images of the lumbar spine were obtained without intravenous  contrast administration.    FINDINGS:  The coronal  images demonstrate a mild dextrocurvature of the  lumbar spine.    There are mild chronic compressive changes involving the L3 vertebral  body.    There is a Schmorl's node noted along the superior endplate of the L1  vertebral body.    There is a large hemangioma within the L3 vertebral body. There is a  27 mm hemangioma within the L1 vertebral body.    There are mild degenerative signal changes noted along endplates  within lumbar region.    The visualized spinal cord demonstrates no signal abnormality within  it.  The conus medullaris is normally positioned terminating at the  L1/2 level.    There is a component of congenital narrowing the spinal canal within  the lower lumbar region.    There is diminished disc signal at the L5/S1 and to a lesser degree  L2/3 levels compatible with degenerative disc disease.    At the L5/S1 level,  there is a mild posterior disc bulge along with  degenerative facet changes contribute to mild spinal canal narrowing.  There is mild encroachment upon the neural foramen bilaterally.    At the L4/L5 level,  there is a mild posterior disc bulge along with  degenerative facet changes and ligamentum flavum hypertrophy  contributing to mild spinal canal narrowing. There is mild  encroachment upon the neural foramen bilaterally.    At the L3/L4 level, there is a minimal posterior disc bulge, mild  degenerative facet changes, and ligamentum flavum  hypertrophy  contribute to mild spinal canal narrowing. There is very mild  encroachment upon the right neural foramen. There is no significant  left-sided neural foraminal narrowing    At the L2/L3 level,  there is a mild posterior disc bulge and  degenerative facet changes contributing to mild spinal canal  narrowing. There is no significant neural foraminal narrowing.    At the L1/L2 level,  there is no significant spinal canal or neural  foraminal narrowing    At the T12/L1 level,  there is no significant spinal canal or neural  foraminal narrowing.    There are bilateral peripelvic renal cysts.    Impression  The coronal  images demonstrate a mild dextrocurvature of the  lumbar spine.    There are mild chronic compressive changes involving the L3 vertebral  body.    There is a Schmorl's node noted along the superior endplate of the L1  vertebral body.    There is a large hemangioma within the L3 vertebral body. There is a  27 mm hemangioma within the L1 vertebral body.    There is a component of congenital narrowing of the lumbar spine  within the lower lumbar region.    There is multilevel spondylosis with varying degrees of spinal canal  and neural foraminal narrowing as described above.    There are bilateral peripelvic renal cysts.    MACRO:  None.    Signed by: Godwin London 11/1/2023 10:50 AM  Dictation workstation:   [id number]     No image results found.       1. Lumbar spondylosis  Sacroiliac Joint Injection    [facility] pain management [facility]        Joint Injection/Aspiration    Date/Time: 1/24/2024 3:55 PM    Performed by: Migel Clark MD  Authorized by: Arlet Lee MD PhD    Consent:     Consent obtained:  Verbal    Consent given by:  Patient    Risks, benefits, and alternatives were discussed: yes      Alternatives discussed:  No treatment and alternative treatment  Universal protocol:     Patient identity confirmed:  Verbally with patient  Location:     Location:  Knee    Knee:  L knee  Anesthesia:      Anesthesia method:  None  Comments:      Left knee intra-articular steroid injection:  After risks, benefits, and alternatives were discussed  informed consent was obtained from the patient. Patient's left knee was prepped in usual sterile fashion with chloroprep.  By landmark technique the femoro-tibial joint space were identified medial to the patellar tendon. After skin wheal using 0.25 % bupivacaine, a 25 guage spinal needle was inserted into the joint space.  After negative aspiration 3 mL of Bupivacaine 0.25% and 40 mg of Depo-medrol was injected into the joint.  The patient tolerated the procedure without complication and will follow up with us regarding response to this injection.       ASSESSMENT/PLAN  Robbie Tovar is a 68 y.o. male with a past medical history of hypertension, obesity, GERD, prediabetes presents to pain clinic for continued management of low back pain.  Patient primarily has axial low back pain with occasional components of referred symptoms to the back of the thigh and lateral aspect of the lower leg.  He also has a large component of pain on transitional movements. His MRI does show multilevel spondylosis with small degrees of spinal canal and neuroforaminal narrowing. There are mild chronic compressive changes along the L3 vertebral body.  Patient with noticeable leg length discrepancy, with left leg being about an inch shorter than the right.  Physical exam findings were positive for left SI joint pathology and would be reasonable to plan for left SI joint injection.     Our plan is as follows:  - Plan for left SI joint injection  - Heel lift for leg length discrepancy  - left knee injection today in office  - Continue to participate in physical therapy as well as physician directed home exercises  - Continue pain medications as prescribed       Migel Clark MD

## 2024-01-25 RX ORDER — HYDROCORTISONE 25 MG/G
CREAM TOPICAL
Qty: 30 G | Refills: 2 | OUTPATIENT
Start: 2024-01-25 | End: 2025-01-23

## 2024-02-06 ENCOUNTER — APPOINTMENT (OUTPATIENT)
Dept: RADIOLOGY | Facility: HOSPITAL | Age: 69
End: 2024-02-06
Payer: COMMERCIAL

## 2024-02-12 PROCEDURE — RXMED WILLOW AMBULATORY MEDICATION CHARGE

## 2024-02-13 ENCOUNTER — PHARMACY VISIT (OUTPATIENT)
Dept: PHARMACY | Facility: CLINIC | Age: 69
End: 2024-02-13
Payer: COMMERCIAL

## 2024-02-20 DIAGNOSIS — K21.9 GASTROESOPHAGEAL REFLUX DISEASE, UNSPECIFIED WHETHER ESOPHAGITIS PRESENT: ICD-10-CM

## 2024-02-20 RX ORDER — PANTOPRAZOLE SODIUM 40 MG/1
40 TABLET, DELAYED RELEASE ORAL EVERY 24 HOURS
Qty: 90 TABLET | Refills: 3 | Status: SHIPPED | OUTPATIENT
Start: 2024-02-20

## 2024-02-21 PROCEDURE — RXMED WILLOW AMBULATORY MEDICATION CHARGE

## 2024-02-22 ENCOUNTER — PHARMACY VISIT (OUTPATIENT)
Dept: PHARMACY | Facility: CLINIC | Age: 69
End: 2024-02-22
Payer: COMMERCIAL

## 2024-02-27 DIAGNOSIS — M17.12 ARTHRITIS OF KNEE, LEFT: ICD-10-CM

## 2024-02-27 DIAGNOSIS — G47.00 INSOMNIA, UNSPECIFIED TYPE: ICD-10-CM

## 2024-02-27 RX ORDER — ZOLPIDEM TARTRATE 10 MG/1
10 TABLET ORAL NIGHTLY PRN
Qty: 30 TABLET | Refills: 2 | Status: SHIPPED | OUTPATIENT
Start: 2024-02-27 | End: 2024-06-07 | Stop reason: SDUPTHER

## 2024-02-27 RX ORDER — CELECOXIB 200 MG/1
200 CAPSULE ORAL 2 TIMES DAILY PRN
Qty: 60 CAPSULE | Refills: 2 | Status: SHIPPED | OUTPATIENT
Start: 2024-02-27 | End: 2024-06-07 | Stop reason: SDUPTHER

## 2024-02-29 DIAGNOSIS — I73.89 OTHER SPECIFIED PERIPHERAL VASCULAR DISEASES (CMS-HCC): ICD-10-CM

## 2024-03-04 PROBLEM — M79.643 PAIN OF HAND: Status: ACTIVE | Noted: 2024-03-04

## 2024-03-04 PROBLEM — U07.1 DISEASE DUE TO SEVERE ACUTE RESPIRATORY SYNDROME CORONAVIRUS 2 (SARS-COV-2): Status: ACTIVE | Noted: 2024-03-04

## 2024-03-04 PROBLEM — R09.81 NASAL CONGESTION: Status: ACTIVE | Noted: 2024-03-04

## 2024-03-04 PROBLEM — H61.22 IMPACTED CERUMEN OF LEFT EAR: Status: ACTIVE | Noted: 2024-03-04

## 2024-03-04 PROBLEM — M25.569 KNEE PAIN: Status: ACTIVE | Noted: 2024-03-04

## 2024-03-04 PROBLEM — L91.9 HYPERTROPHIC CONDITION OF SKIN: Status: ACTIVE | Noted: 2023-01-11

## 2024-03-04 PROBLEM — R10.9 ABDOMINAL PAIN: Status: ACTIVE | Noted: 2024-03-04

## 2024-03-04 PROBLEM — J20.9 ACUTE BRONCHITIS: Status: ACTIVE | Noted: 2024-03-04

## 2024-03-04 PROBLEM — M43.00 SPONDYLOLYSIS: Status: ACTIVE | Noted: 2024-03-04

## 2024-03-04 PROBLEM — E66.3 OVERWEIGHT: Status: RESOLVED | Noted: 2023-11-11 | Resolved: 2024-03-04

## 2024-03-04 PROBLEM — R05.9 COUGH: Status: ACTIVE | Noted: 2024-03-04

## 2024-03-06 DIAGNOSIS — I89.0 LYMPHEDEMA OF LOWER EXTREMITY, UNSPECIFIED LATERALITY: ICD-10-CM

## 2024-03-07 ENCOUNTER — APPOINTMENT (OUTPATIENT)
Dept: VASCULAR MEDICINE | Facility: CLINIC | Age: 69
End: 2024-03-07
Payer: COMMERCIAL

## 2024-03-07 ENCOUNTER — HOSPITAL ENCOUNTER (OUTPATIENT)
Dept: VASCULAR MEDICINE | Facility: CLINIC | Age: 69
Discharge: HOME | End: 2024-03-07
Payer: COMMERCIAL

## 2024-03-07 ENCOUNTER — OFFICE VISIT (OUTPATIENT)
Dept: CARDIOLOGY | Facility: CLINIC | Age: 69
End: 2024-03-07
Payer: COMMERCIAL

## 2024-03-07 VITALS
HEART RATE: 74 BPM | BODY MASS INDEX: 30.83 KG/M2 | SYSTOLIC BLOOD PRESSURE: 138 MMHG | HEIGHT: 73 IN | OXYGEN SATURATION: 96 % | WEIGHT: 232.6 LBS | DIASTOLIC BLOOD PRESSURE: 86 MMHG

## 2024-03-07 DIAGNOSIS — I89.0 LYMPHEDEMA OF LOWER EXTREMITY, UNSPECIFIED LATERALITY: ICD-10-CM

## 2024-03-07 DIAGNOSIS — I87.2 VENOUS INSUFFICIENCY OF RIGHT LEG: Primary | ICD-10-CM

## 2024-03-07 DIAGNOSIS — I10 BENIGN ESSENTIAL HTN: ICD-10-CM

## 2024-03-07 PROCEDURE — 99202 OFFICE O/P NEW SF 15 MIN: CPT | Performed by: INTERNAL MEDICINE

## 2024-03-07 PROCEDURE — 3075F SYST BP GE 130 - 139MM HG: CPT | Performed by: INTERNAL MEDICINE

## 2024-03-07 PROCEDURE — 1125F AMNT PAIN NOTED PAIN PRSNT: CPT | Performed by: INTERNAL MEDICINE

## 2024-03-07 PROCEDURE — 93970 EXTREMITY STUDY: CPT

## 2024-03-07 PROCEDURE — 93970 EXTREMITY STUDY: CPT | Performed by: SURGERY

## 2024-03-07 PROCEDURE — 1159F MED LIST DOCD IN RCRD: CPT | Performed by: INTERNAL MEDICINE

## 2024-03-07 PROCEDURE — 1036F TOBACCO NON-USER: CPT | Performed by: INTERNAL MEDICINE

## 2024-03-07 PROCEDURE — 3079F DIAST BP 80-89 MM HG: CPT | Performed by: INTERNAL MEDICINE

## 2024-03-07 NOTE — PROGRESS NOTES
PCP: Robbie Caldwell MD    Subjective   Robbie Tovar is a 68 y.o. male who complains of RLE sxs . Soreness RLE towards end of day.  Exercise on elliptical 20-30 mins per day and walks at work without arterial claudication sxs. No consistent compression use.    Mom  at 80s yo from CAD.      Review of Systems:  Otherwise, limited cardiovascular review of systems is negative.    Past Medical History:  He has a past medical history of Chronic sinusitis, unspecified (2016), Plantar fascial fibromatosis (2016), and Primary insomnia (2016).    Surgical History:   He has a past surgical history that includes Other surgical history (2021); Other surgical history (2021); and Other surgical history (2020).    Family History:   Family History   Problem Relation Name Age of Onset    Allergies Mother      Other (cardiac disorder) Mother          stent 70s    Hypertension Mother      Hypertension Father      Allergies Sister         Social History:   Tobacco Use: Low Risk  (2023)    Patient History     Smoking Tobacco Use: Never     Smokeless Tobacco Use: Never     Passive Exposure: Not on file       Outpatient Medications:    Current Outpatient Medications:     celecoxib (CeleBREX) 200 mg capsule, Take 1 capsule (200 mg) by mouth 2 times a day as needed for mild pain (1 - 3), Disp: 60 capsule, Rfl: 2    hydroCHLOROthiazide (HYDRODiuril) 12.5 mg tablet, Take 1 tablet (12.5 mg) by mouth once daily., Disp: 90 tablet, Rfl: 1    zolpidem (Ambien) 10 mg tablet, TAKE 1 TABLET (10 MG) BY MOUTH AS NEEDED AT BEDTIME FOR SLEEP., Disp: 30 tablet, Rfl: 2    ciclopirox (Loprox) 0.77 % cream, APPLY 1 APPLICATION TO AFFECTED AREA TWICE DAILY., Disp: 90 g, Rfl: 3    fluticasone (Flonase) 50 mcg/actuation nasal spray, Administer 2 sprays into each nostril once daily., Disp: , Rfl:     hydrocortisone 2.5 % cream, APPLY TO AFFECTED AREA(S) OF ANGLES OF MOUTH (MIXED WITH KETOCONAZOLE CREAM) TWO TIMES A  "DAY FOR 2 WEEKS. REPEAT AS NEEDED FLARE (Patient not taking: Reported on 10/9/2023), Disp: 30 g, Rfl: 2    pantoprazole (ProtoNix) 40 mg EC tablet, Take 1 tablet (40 mg) by mouth once every 24 hours., Disp: 90 tablet, Rfl: 3     Allergies:  Iodinated contrast media       Objective   Vital Signs:  /86 (BP Location: Left arm, Patient Position: Sitting, BP Cuff Size: Large adult)   Pulse 74   Ht 1.854 m (6' 1\")   Wt 106 kg (232 lb 9.6 oz)   SpO2 96%   BMI 30.69 kg/m²     Physical Exam:  General: no acute distress  HEENT: EOMI, no scleral icterus.  Lungs: Clear to auscultation bilaterally without wheezing, rales, or rhonchi.  Cardiovascular: Regular rhythm and rate. Normal S1 and S2. No murmurs, rubs, or gallops are appreciated. JVP normal.  no bruits  Abdomen: Soft, nontender, nondistended. Bowel sounds present.  Extremities: 2+ distal pulses; 1+ edema R>LLE.  Neurologic: Alert and oriented x3.    Pertinent Recent Cardiovascular Studies (personally reviewed):  Vascular studies:  Venous insufficiency 3/7/2024: RLE reflux R CFV, calf GSV and SSV. LLE reflux SFJ.    Laboratory values:  CMP:  Recent Labs     08/22/22  1553 06/25/21  0617 06/15/21  0802 12/04/18  0930    135* 142 141   K 4.1 4.0 4.3 4.1    102 108* 105   CO2 27 28 28 27   ANIONGAP 13 9* 10 13   BUN 23 17 23 21   CREATININE 0.96 0.80 0.82 0.81     Recent Labs     08/22/22  1553 06/15/21  0802 12/04/18  0930   ALBUMIN 4.6 4.1 4.2   ALKPHOS 79 76 83   ALT 25 22 20   AST 21 18 18   BILITOT 0.5 0.3 0.5     CBC:  Recent Labs     09/22/23  1308 08/22/22  1553 06/25/21  0617 06/15/21  0802 12/04/18  0930   WBC 9.6 9.2 12.6* 9.0 8.0   HGB 14.5 15.3 12.0* 14.6 15.0   HCT 44.7 46.4 37.0* 44.7 45.4    166 137* 164 137*   MCV 89 90 88 87 88     COAG:   Recent Labs     06/15/21  0802   INR 1.0     ABO:   Recent Labs     06/16/21  1351   ABO A     HEME/ENDO:  Recent Labs     08/22/22  1553 06/15/21  0802 12/04/18  0930   TSH  --  1.57  --  " "  HGBA1C 5.9*  --  5.6      CARDIAC: No results for input(s): \"LDH\", \"CKMB\", \"TROPHS\", \"BNP\" in the last 67567 hours.    No lab exists for component: \"CK\", \"CKMBP\"  Recent Labs     08/22/22  1553 06/15/21  0802 12/04/18  0930   CHOL 142 159 157   LDLF 85 100* 98   HDL 36.8* 33.2* 38.7*   TRIG 101 129 102     MICRO: No results for input(s): \"ESR\", \"CRP\", \"PROCAL\" in the last 92149 hours.  No results found for the last 90 days.         I have personally reviewed most recent PCP, cardiology, vascular, and/or podiatry documentation.      Assessment/Plan   68 y.o. male with chronic venous insufficiency R>LLE.    Plan:  Compression 20-30 mmHg BLE.  Dr. Meneses referral to discuss ablative strategies.         SIGNATURE: Pete Agudelo MD PATIENT NAME: Robbie Tovar   DATE/TIME: March 7, 2024 9:11 AM MRN: [id number]     "

## 2024-03-07 NOTE — PATIENT INSTRUCTIONS
Elevation and compression is cornerstone of any therapy for edema.    For your swelling of the legs, can get compression stockings on Maiyet. The brand we recommend is Jobst, compression of 20-30 mmHg. Zipper can be considered if easier to use.    Dr. Meneses referral.

## 2024-03-11 PROCEDURE — RXMED WILLOW AMBULATORY MEDICATION CHARGE

## 2024-03-12 ENCOUNTER — PHARMACY VISIT (OUTPATIENT)
Dept: PHARMACY | Facility: CLINIC | Age: 69
End: 2024-03-12
Payer: COMMERCIAL

## 2024-03-12 ENCOUNTER — OFFICE VISIT (OUTPATIENT)
Dept: VASCULAR SURGERY | Facility: CLINIC | Age: 69
End: 2024-03-12
Payer: COMMERCIAL

## 2024-03-12 VITALS
SYSTOLIC BLOOD PRESSURE: 145 MMHG | BODY MASS INDEX: 31.01 KG/M2 | DIASTOLIC BLOOD PRESSURE: 92 MMHG | HEART RATE: 73 BPM | HEIGHT: 73 IN | WEIGHT: 234 LBS

## 2024-03-12 DIAGNOSIS — I80.9 PHLEBITIS: Primary | ICD-10-CM

## 2024-03-12 DIAGNOSIS — I87.2 EDEMA OF RIGHT LOWER EXTREMITY DUE TO PERIPHERAL VENOUS INSUFFICIENCY: ICD-10-CM

## 2024-03-12 PROCEDURE — 3077F SYST BP >= 140 MM HG: CPT | Performed by: SURGERY

## 2024-03-12 PROCEDURE — 1036F TOBACCO NON-USER: CPT | Performed by: SURGERY

## 2024-03-12 PROCEDURE — 3080F DIAST BP >= 90 MM HG: CPT | Performed by: SURGERY

## 2024-03-12 PROCEDURE — 99214 OFFICE O/P EST MOD 30 MIN: CPT | Performed by: SURGERY

## 2024-03-12 PROCEDURE — 1125F AMNT PAIN NOTED PAIN PRSNT: CPT | Performed by: SURGERY

## 2024-03-12 PROCEDURE — 99204 OFFICE O/P NEW MOD 45 MIN: CPT | Performed by: SURGERY

## 2024-03-12 PROCEDURE — 1159F MED LIST DOCD IN RCRD: CPT | Performed by: SURGERY

## 2024-03-12 ASSESSMENT — ENCOUNTER SYMPTOMS
LOSS OF SENSATION IN FEET: 0
OCCASIONAL FEELINGS OF UNSTEADINESS: 0
DEPRESSION: 0

## 2024-03-12 ASSESSMENT — PAIN SCALES - GENERAL: PAINLEVEL: 3

## 2024-03-12 NOTE — PATIENT INSTRUCTIONS
It was a pleasure taking care of you today and appreciate your seeing us at our [facility]    Based on your leg symptoms, venous insufficiency studies and potential for clinical improvement, I am recommeding the followin) please wear your compression socks daily as discussed  2) I would recommend eliquis low dose starting 48 hrs for your upcoming overseas trip.     Please call the office with any questions at [phone].   For Vein Center specific questions, particularly insurance related questions of booking your [facility] intervention, you can call [phone] or email at [email or web address]    You can speak directly to my [facility] Center Nurse Coordinator, Ashley Schmitz, for specific questions.       Ronaldo Meneses MD, MHS, RPVI  , [facility] [employer]  Director, [facility], [facility]  Co-Director, Vascular Laboratories, [facility]  Division of Vascular Surgery and Endovascular Therapy  [facility]

## 2024-03-12 NOTE — PROGRESS NOTES
NPV REASON: RIGHT LEG SWELLING    CURRENT ENCOUNTER:  Robbie Tovar is 68 y.o. male here for follow up of RIGHT LEG SWELLING.    One year ago right knee replacement - had some issues with poor function since then - had lots of therapy - still feels   No dvt from surgery - did report some edema but no US performed.   Had xarelto post knee replacement - was on it for 1-2 days only - concern for some local incisional healing reaction early on so had it stopped.   No swelling from VV prior to the knee surgery  Was on aspirin  No prior phlebitis or dvt  More pronounced after his right knee repositioning - has not achieved full range of motion but is ambulating ok and has worked very hard at his physical therapy rehab.   No compression socks yet  Only wore hospital   Ordered jobst - waiting to get them.     RIGHT LEG C3 Edema  RIGHT LEG PAIN 2, VARICOSE VEINS 3, and VENOUS EDEMA 2  RIGHT LEG CEAP: C3  RIGHT LEG VCSS SCORE: 7    ELIQUIS 2.5MG BID LOT # [id number] X2    PastMedHX:  Past Medical History:   Diagnosis Date    Chronic sinusitis, unspecified 09/21/2016    Clinical sinusitis    Plantar fascial fibromatosis 09/21/2016    Plantar fasciitis    Primary insomnia 09/21/2016    Primary insomnia       Meds:   Current Outpatient Medications:     celecoxib (CeleBREX) 200 mg capsule, Take 1 capsule (200 mg) by mouth 2 times a day as needed for mild pain (1 - 3), Disp: 60 capsule, Rfl: 2    fluticasone (Flonase) 50 mcg/actuation nasal spray, Administer 2 sprays into each nostril once daily., Disp: , Rfl:     pantoprazole (ProtoNix) 40 mg EC tablet, Take 1 tablet (40 mg) by mouth once every 24 hours., Disp: 90 tablet, Rfl: 3    zolpidem (Ambien) 10 mg tablet, TAKE 1 TABLET (10 MG) BY MOUTH AS NEEDED AT BEDTIME FOR SLEEP., Disp: 30 tablet, Rfl: 2    ciclopirox (Loprox) 0.77 % cream, APPLY 1 APPLICATION TO AFFECTED AREA TWICE DAILY. (Patient not taking: Reported on 3/12/2024), Disp: 90 g, Rfl: 3    hydroCHLOROthiazide  (HYDRODiuril) 12.5 mg tablet, Take 1 tablet (12.5 mg) by mouth once daily. (Patient not taking: Reported on 3/12/2024), Disp: 90 tablet, Rfl: 1    hydrocortisone 2.5 % cream, APPLY TO AFFECTED AREA(S) OF ANGLES OF MOUTH (MIXED WITH KETOCONAZOLE CREAM) TWO TIMES A DAY FOR 2 WEEKS. REPEAT AS NEEDED FLARE (Patient not taking: Reported on 10/9/2023), Disp: 30 g, Rfl: 2    Allergies:   Allergies   Allergen Reactions    Iodinated Contrast Media Nausea/vomiting       ROS:  Review of Systems     Objective:  Vitals:  There were no vitals filed for this visit.     Exam:  No distress  Breathing comfortably   Not tachycardic  Abd soft, nt, nd  B/l legs well perfused feet  Right leg edema from ankle down  Healed knee incision  Vv as shown in images                  Labs:  Lab Results   Component Value Date    WBC 9.6 09/22/2023    WBC 9.2 08/22/2022    WBC 12.6 (H) 06/25/2021    HGB 14.5 09/22/2023    HGB 15.3 08/22/2022    HGB 12.0 (L) 06/25/2021    HCT 44.7 09/22/2023    HCT 46.4 08/22/2022    HCT 37.0 (L) 06/25/2021    MCV 89 09/22/2023    MCV 90 08/22/2022    MCV 88 06/25/2021     09/22/2023     Lab Results   Component Value Date    CREATININE 0.96 08/22/2022    CREATININE 0.80 06/25/2021    CREATININE 0.82 06/15/2021    BUN 23 08/22/2022    BUN 17 06/25/2021    BUN 23 06/15/2021     08/22/2022     (L) 06/25/2021     06/15/2021    K 4.1 08/22/2022    K 4.0 06/25/2021    K 4.3 06/15/2021     08/22/2022     06/25/2021     (H) 06/15/2021    CO2 27 08/22/2022    CO2 28 06/25/2021    CO2 28 06/15/2021         Imaging:  VI scan reviewed - has fibrotic right SSV - suspect maybe post surgical? With reflux; small caliber; only has calf GSV reflux, small caliber; no deep vein reflux    Assessment & Plan:  Robbie Tovar is 68 y.o. male with history of right TKA complicated by loss of knee ROM and prolonged rehab/recovery. History of right leg VV which were asymptomatic prior to knee surgery.      RIGHT LEG CEAP: C3  RIGHT LEG VCSS SCORE: 7    Suspect his edema is multi-factorial  Calf pump dysfunction  Underlying mild VI  Pending initiation of compression use    Patient will start compression therapy and see me back either before his upcoming international trip or prior to that  Given suspicion for post surgical phlebitis in right SSV and poor gait and prolonged oversease flight, have recommended prophylactic dose of eliquis 2.5mg bid while on travel.         Ronaldo Meneses MD, MHS, RPVI  , [facility] [employer]  Director, [facility], [facility]  Co-Director, Vascular Laboratories, [facility]  Division of Vascular Surgery and Endovascular Therapy  [facility]

## 2024-03-18 ENCOUNTER — PHARMACY VISIT (OUTPATIENT)
Dept: PHARMACY | Facility: CLINIC | Age: 69
End: 2024-03-18
Payer: COMMERCIAL

## 2024-03-18 PROCEDURE — RXMED WILLOW AMBULATORY MEDICATION CHARGE

## 2024-03-18 RX ORDER — METHYLPREDNISOLONE 4 MG/1
TABLET ORAL
Qty: 21 TABLET | Refills: 0 | OUTPATIENT
Start: 2024-03-18 | End: 2024-05-30 | Stop reason: SDUPTHER

## 2024-03-18 RX ORDER — AZITHROMYCIN 250 MG/1
TABLET, FILM COATED ORAL
Qty: 6 TABLET | Refills: 0 | OUTPATIENT
Start: 2024-03-18

## 2024-03-18 RX ORDER — FLUTICASONE PROPIONATE 50 MCG
SPRAY, SUSPENSION (ML) NASAL
Qty: 16 G | Refills: 0 | OUTPATIENT
Start: 2024-03-18

## 2024-03-20 ENCOUNTER — EVALUATION (OUTPATIENT)
Dept: PHYSICAL THERAPY | Facility: CLINIC | Age: 69
End: 2024-03-20
Payer: COMMERCIAL

## 2024-03-20 DIAGNOSIS — Z74.09 IMPAIRED FUNCTIONAL MOBILITY, BALANCE, AND ENDURANCE: ICD-10-CM

## 2024-03-20 DIAGNOSIS — M25.661 DECREASED RANGE OF MOTION OF RIGHT KNEE: Primary | ICD-10-CM

## 2024-03-20 DIAGNOSIS — M25.561 CHRONIC PAIN OF RIGHT KNEE: ICD-10-CM

## 2024-03-20 DIAGNOSIS — G89.29 CHRONIC PAIN OF RIGHT KNEE: ICD-10-CM

## 2024-03-20 PROCEDURE — 97110 THERAPEUTIC EXERCISES: CPT | Mod: GP | Performed by: PHYSICAL THERAPIST

## 2024-03-20 PROCEDURE — 97161 PT EVAL LOW COMPLEX 20 MIN: CPT | Mod: GP | Performed by: PHYSICAL THERAPIST

## 2024-03-20 PROCEDURE — 97535 SELF CARE MNGMENT TRAINING: CPT | Mod: GP | Performed by: PHYSICAL THERAPIST

## 2024-03-20 SDOH — ECONOMIC STABILITY: FOOD INSECURITY: WITHIN THE PAST 12 MONTHS, YOU WORRIED THAT YOUR FOOD WOULD RUN OUT BEFORE YOU GOT MONEY TO BUY MORE.: NEVER TRUE

## 2024-03-20 SDOH — ECONOMIC STABILITY: FOOD INSECURITY: WITHIN THE PAST 12 MONTHS, THE FOOD YOU BOUGHT JUST DIDN'T LAST AND YOU DIDN'T HAVE MONEY TO GET MORE.: NEVER TRUE

## 2024-03-20 ASSESSMENT — ENCOUNTER SYMPTOMS
LOSS OF SENSATION IN FEET: 0
OCCASIONAL FEELINGS OF UNSTEADINESS: 0
DEPRESSION: 0

## 2024-03-20 NOTE — PROGRESS NOTES
Physical Therapy    Physical Therapy Evaluation    Patient Name: Robbie Tovar  MRN: [id number]  Today's Date: 03/20/24  Time Calculation  Start Time: 1500  Stop Time: 1548  Time Calculation (min): 48 min   PT Evaluation Time Entry  PT Evaluation (Low) Time Entry: 15  PT Therapeutic Procedures Time Entry  Therapeutic Exercise Time Entry: 15  Self-Care/Home Mgmt Training: 15    Insurance:  Visit number: 1 of 30 on  plan per year 8 on POC   Insurance Type: Payor:  EMPLOYEE MEDICAL PLAN / Plan:  EMPLOYEE MEDICAL PLAN TRADITIONAL  / Product Type: *No Product type* /   Authorization or Plan of Care date Range:     Therapy diagnoses: impaired functional mobility and balance, S/P R TKR ( remote) , R knee pain , Decreased R knee AROM     General:  Reason for visit: Continued difficulty bending R knee, riding bike, fully participating in exercise, navigating stairs.    Referred by:  Self referral - familiar to services  Next MD appt: No follow scheduled with referring MD.    Precautions:  None indicated   Fall Risk: None     Assessment:   Jose Carlos presents after a long course of recovery s/p R TKR in 2022.  AROM has been an ongoing issue and pt did undergo a R knee manip in 2023.  ROM continues to be limited and is affecting stair navigation, rising from chair, squatting, and pt has developed LBP which may be related. Recommend course of PT to reeducate in stretching program to facilitate R knee improved ROM and tolerance of functional activities.     Impairments: Pain, Active ROM, Balance, Joint mobility, and Decreased functional level    Functional Limitations: Stair negotiation, rising from chair, squatting     Recommended Treatment:  Therapeutic exercise, Manual therapy, Gait training, Home program instruction and progression, Neuromuscular re-education, Therapeutic activities, Self care and home management, Dry Needling, Edema control, and Aquatic therapy      Plan:  Plan of care was developed with input and agreement by  "the patient.  Address AROM deficits, joint capsule and soft tissue tightness at R knee to improve knee ROM with functional activities such as stairs, squatting, getting up from chair.  Formulate good HEP incorporating stretching. Review gym exercise program and modify for best results     Rehab Potential:   Good to achieve goals.    Goals:  Alleviate chief complaint of: R knee stiffness .  Increase flexibility of R LE  .  Return to exercise for an active healthy lifestyle and injury prevention.  Return to 100% performance of activities of daily living which includes: ease with rising from chair, ascending and descending stairs with no difficulty, .  Independent in home stretching program to facilitate R knee mobility       Subjective:  CC:  decreased function R LE on stairs   WYATT:  R knee TKR - 2022    PAIN - Location: R knee  Worst(past 24 hours): 8  Least(past 24 hours): 0  PAIN - Alleviating:  stop bending knee   Aggravating:  stairs, forced AROM   CURRENT MEDICAL MANAGEMENT: recently started wearing compression stockings after consult with vascular surgeon,   PLOF: fully Independent  FUNCTIONAL LIMITATIONS: Stair negotiation   LIVING ENVIRONMENT: multi-level house  WORK: working full time  EXERCISE: [employer]   Patient Stated Goal: increase flexibility R knee    Medical History Form: Reviewed (scanned into chart)       Objective:   Knee ROM Right: 10-82 degrees flexion  pain with terminal flexion  pain with terminal extension  Knee ROM Left: lacking 5 degrees to full extension     LE Myotomes Lower extremity myotomes are bilaterally WNL  Hip Flex (L2) R/L: 5 / 5  Knee Ext (L3) R/L: 5 / 5   Ankle DF (L4) R/L: 5 / 5          Outcome Measures:  Pt declined filling out LEFS     Treatment Performed: (\"NP\" = Not Performed)     Therapeutic Exercise:     15 minutes  Home exercises instructed. Answered questions about home exercise program. Provided verbal feedback to improve exercise technique. HEP: terminal knee ext " stretch, knee flex stretch - supported squat, stair stretch, use of bike/ nu step at gym for stretching, use of leg press for stretching          Self Care Home Management:  15 minutes  Consistent stretching during day.  Attention to form with rising from chair, stairs, rest positions. Cont to use compression garments, cont to monitor gait technique         Evaluation Complexity: Low: 15 minutes;

## 2024-03-26 ENCOUNTER — DOCUMENTATION (OUTPATIENT)
Dept: PHYSICAL THERAPY | Facility: CLINIC | Age: 69
End: 2024-03-26
Payer: COMMERCIAL

## 2024-03-26 PROCEDURE — RXMED WILLOW AMBULATORY MEDICATION CHARGE

## 2024-03-26 NOTE — PROGRESS NOTES
Physical Therapy                 Therapy Communication Note    Patient Name: Robbie Tovar  MRN: [id number]  Today's Date: 3/26/2024     Discipline: Physical Therapy    Missed Visit Reason:  Pt canceling current appointments and will call to reschedule after 5/2024. Pt reports understanding of HEP     Missed Time: Cancel    Comment:

## 2024-03-27 ENCOUNTER — PHARMACY VISIT (OUTPATIENT)
Dept: PHARMACY | Facility: CLINIC | Age: 69
End: 2024-03-27
Payer: COMMERCIAL

## 2024-04-03 ENCOUNTER — APPOINTMENT (OUTPATIENT)
Dept: PHYSICAL THERAPY | Facility: CLINIC | Age: 69
End: 2024-04-03
Payer: COMMERCIAL

## 2024-04-09 ENCOUNTER — PHARMACY VISIT (OUTPATIENT)
Dept: PHARMACY | Facility: CLINIC | Age: 69
End: 2024-04-09
Payer: COMMERCIAL

## 2024-04-09 PROCEDURE — RXMED WILLOW AMBULATORY MEDICATION CHARGE

## 2024-04-09 RX ORDER — TOBRAMYCIN AND DEXAMETHASONE 3; 1 MG/ML; MG/ML
SUSPENSION/ DROPS OPHTHALMIC
Qty: 5 ML | Refills: 0 | OUTPATIENT
Start: 2024-04-09

## 2024-04-10 ENCOUNTER — APPOINTMENT (OUTPATIENT)
Dept: PHYSICAL THERAPY | Facility: CLINIC | Age: 69
End: 2024-04-10
Payer: COMMERCIAL

## 2024-04-11 ENCOUNTER — OFFICE VISIT (OUTPATIENT)
Dept: PAIN MEDICINE | Facility: CLINIC | Age: 69
End: 2024-04-11
Payer: COMMERCIAL

## 2024-04-11 VITALS
HEART RATE: 77 BPM | SYSTOLIC BLOOD PRESSURE: 139 MMHG | DIASTOLIC BLOOD PRESSURE: 78 MMHG | RESPIRATION RATE: 18 BRPM | OXYGEN SATURATION: 94 % | TEMPERATURE: 97.5 F | BODY MASS INDEX: 29.82 KG/M2 | HEIGHT: 73 IN | WEIGHT: 225 LBS

## 2024-04-11 DIAGNOSIS — M51.26 LUMBAR DISC HERNIATION: ICD-10-CM

## 2024-04-11 DIAGNOSIS — M47.816 LUMBAR SPONDYLOSIS: Primary | ICD-10-CM

## 2024-04-11 DIAGNOSIS — M54.50 CHRONIC LEFT-SIDED LOW BACK PAIN WITHOUT SCIATICA: ICD-10-CM

## 2024-04-11 DIAGNOSIS — M54.16 LUMBAR NEURITIS: ICD-10-CM

## 2024-04-11 DIAGNOSIS — G89.29 CHRONIC LEFT-SIDED LOW BACK PAIN WITHOUT SCIATICA: ICD-10-CM

## 2024-04-11 DIAGNOSIS — M17.12 OSTEOARTHRITIS OF LEFT KNEE, UNSPECIFIED OSTEOARTHRITIS TYPE: ICD-10-CM

## 2024-04-11 PROCEDURE — 99214 OFFICE O/P EST MOD 30 MIN: CPT | Performed by: ANESTHESIOLOGY

## 2024-04-11 RX ORDER — ALPHA LIPOIC ACID 50 MG
CAPSULE ORAL
COMMUNITY

## 2024-04-11 SDOH — ECONOMIC STABILITY: FOOD INSECURITY: WITHIN THE PAST 12 MONTHS, THE FOOD YOU BOUGHT JUST DIDN'T LAST AND YOU DIDN'T HAVE MONEY TO GET MORE.: NEVER TRUE

## 2024-04-11 SDOH — ECONOMIC STABILITY: FOOD INSECURITY: WITHIN THE PAST 12 MONTHS, YOU WORRIED THAT YOUR FOOD WOULD RUN OUT BEFORE YOU GOT MONEY TO BUY MORE.: NEVER TRUE

## 2024-04-11 ASSESSMENT — ENCOUNTER SYMPTOMS
DEPRESSION: 0
LOSS OF SENSATION IN FEET: 0
OCCASIONAL FEELINGS OF UNSTEADINESS: 0

## 2024-04-11 ASSESSMENT — LIFESTYLE VARIABLES
HOW MANY STANDARD DRINKS CONTAINING ALCOHOL DO YOU HAVE ON A TYPICAL DAY: PATIENT DOES NOT DRINK
AUDIT-C TOTAL SCORE: 0
HOW OFTEN DO YOU HAVE A DRINK CONTAINING ALCOHOL: NEVER
TOTAL SCORE: 3
SKIP TO QUESTIONS 9-10: 1
HOW OFTEN DO YOU HAVE SIX OR MORE DRINKS ON ONE OCCASION: NEVER

## 2024-04-11 ASSESSMENT — PATIENT HEALTH QUESTIONNAIRE - PHQ9
SUM OF ALL RESPONSES TO PHQ9 QUESTIONS 1 & 2: 0
2. FEELING DOWN, DEPRESSED OR HOPELESS: NOT AT ALL
1. LITTLE INTEREST OR PLEASURE IN DOING THINGS: NOT AT ALL

## 2024-04-11 ASSESSMENT — PAIN - FUNCTIONAL ASSESSMENT: PAIN_FUNCTIONAL_ASSESSMENT: 0-10

## 2024-04-11 ASSESSMENT — PAIN SCALES - GENERAL
PAINLEVEL_OUTOF10: 9
PAINLEVEL: 9

## 2024-04-11 ASSESSMENT — PAIN DESCRIPTION - DESCRIPTORS: DESCRIPTORS: ACHING;SHARP

## 2024-04-11 NOTE — PROGRESS NOTES
Pt is a new visit and is complaining of pain in his low back left side and left knee. Pt states his pain level is a 9/10 on the pain scale. Pt denies back or neck surgery.

## 2024-04-11 NOTE — PROGRESS NOTES
History Of Present Illness  Robbie Tovar is a 68 y.o. male presenting with   Chief Complaint   Patient presents with    Back Pain     Left knee       Patient presents with complaints of chronic left sided low back pain to the LLE. The pain is constant, worse with bending activity and better with rest/sitting. The pain is an aching and throbbing like sensation towards the LEFT SIDE . Denies LE paresthesias,  saddle anesthesia, bowel or bladder incontinence. To manage this pain the patient has attempted CELEBREX with 30-60% relief at times.  The patients chronic GERD, allergies, insomnia are stable on medication management.     PAIN SCORE: 8-9/10.       PP:Dr. Shirley, Dr. Lee     Past Medical History  He has a past medical history of Chronic sinusitis, unspecified (09/21/2016), Plantar fascial fibromatosis (09/21/2016), and Primary insomnia (09/21/2016).    Surgical History  He has a past surgical history that includes Other surgical history (05/18/2021); Other surgical history (05/18/2021); and Other surgical history (05/12/2020).     Social History  He reports that he has never smoked. He has never used smokeless tobacco. He reports that he does not drink alcohol and does not use drugs.    Family History  Family History   Problem Relation Name Age of Onset    Allergies Mother      Other (cardiac disorder) Mother          stent 70s    Hypertension Mother      Hypertension Father      Allergies Sister          Allergies  Iodinated contrast media    Review of Systems    All other systems reviewed and negative for any deficits. Pertinent positives and negatives were considered in the medical decision making process.        Physical Exam  /78   Pulse 77   Temp 36.4 °C (97.5 °F)   Resp 18   Wt 102 kg (225 lb)   SpO2 94%     General: Pt appears stated age    Eyes: Conjunctiva non-icteric and lids without obvious rash or drooping. Pupils are symmetric.     Conjunctiva injected    ENT: External ears and nose  appear to be without deformity or rash. No lesions or masses noted. Hearing is grossly intact    Neck: No JVD noted, tracheal position midline. No goiter noted on assessment of thyroid    Respiratory: No gasping or shortness of breath noted, no use of accessory muscles noted    Cardiovascular: Extremities show no edema or varicosities    Skin: No rashes or open lesions/ulcers identified on skin. No induration/tightening noted with palpation of skin    Musculoskeletal: Gait is grossly normal    Digits/nails show no clubbing or cyanosis    Exam of muscles/joints/bones shows no gross atrophy and no abnormal/involuntary movements in the head/neckNo asymmetry or masses noted in the head/neck    Stability: no subluxation noted on movement of bilateral upper extremities or head/neck    Strength: 4/5 in RLE and 5/5 LLE     Range of Motion: WNL     Sensation: DEC HIP FLEXION IN RLE     Cranial nerves 2-12 are grossly intact    Psychiatric: Pt is alert and oriented to time, place and person.         Assessment/Plan   1. Lumbar spondylosis        2. Lumbar disc herniation        3. Chronic left-sided low back pain without sciatica             1. I have provided the patient with a list of physical therapy exercises to learn and perform to strengthen core, maintain stabilization, and reduce pain. We reviewed the exercises in detail and I encouraged them to perform them on a regular basis.    2. I would recommend the pt continue Celebrex to help with nerve related pain. We discussed the risks, benefits, and side effects to this medication including the mechanism of action and the pt understands.     3. I extensively reviewed the patients MRI findings in detail, including review of the actual images and provided a detailed explanation of the findings using a spine model.     There is noted facet degenerative changes at the L4/5 and L5/S1 level with mild canal stenosis.     There is a disc herniation at the L4/5 and more  prominently at the L5/S1 level.     4.  The patient is a candidate for an LESI L5/S1 to treat low back and radicular pain. I spent time with the patient discussing all of the risks, benefits, and alternatives to this measure. Including but not limited to worsening pain with injection, no improvement with pain, numbness in the lower extremity, vagal reaction, hypotension, feeling lightheaded/dizzy, spinal infection, epidural hematoma/abscess, paralysis, nerve injury, steroid effects, and spinal headache.The patient understands ALL of these risks and agrees to proceed with the planned procedure.    Will schedule with Valium 10mg PO.     Ar Jean MD    I spent time with the patient reviewing their imaging and discussing the risks benefits and alternatives to the above plan. A total of 45 minutes was spent reviewing the data and greater than 50% of that time was with the patient during the face to face encounter discussing treatment options both surgical, non-surgical, and minimally invasive techniques.

## 2024-04-12 ENCOUNTER — PHARMACY VISIT (OUTPATIENT)
Dept: PHARMACY | Facility: CLINIC | Age: 69
End: 2024-04-12
Payer: COMMERCIAL

## 2024-04-12 PROCEDURE — RXMED WILLOW AMBULATORY MEDICATION CHARGE

## 2024-04-12 RX ORDER — PREDNISOLONE ACETATE 10 MG/ML
1 SUSPENSION/ DROPS OPHTHALMIC
Qty: 5 ML | Refills: 0 | OUTPATIENT
Start: 2024-04-12 | End: 2024-04-17 | Stop reason: SDUPTHER

## 2024-04-16 ENCOUNTER — OFFICE VISIT (OUTPATIENT)
Dept: OPHTHALMOLOGY | Facility: CLINIC | Age: 69
End: 2024-04-16
Payer: COMMERCIAL

## 2024-04-16 DIAGNOSIS — B30.0 EKC (EPIDEMIC KERATOCONJUNCTIVITIS): Primary | ICD-10-CM

## 2024-04-16 PROCEDURE — 92002 INTRM OPH EXAM NEW PATIENT: CPT | Performed by: OPTOMETRIST

## 2024-04-16 ASSESSMENT — ENCOUNTER SYMPTOMS
ALLERGIC/IMMUNOLOGIC NEGATIVE: 0
CONSTITUTIONAL NEGATIVE: 0
ENDOCRINE NEGATIVE: 0
PSYCHIATRIC NEGATIVE: 0
MUSCULOSKELETAL NEGATIVE: 0
EYES NEGATIVE: 1
NEUROLOGICAL NEGATIVE: 0
CARDIOVASCULAR NEGATIVE: 0
RESPIRATORY NEGATIVE: 0
GASTROINTESTINAL NEGATIVE: 0
HEMATOLOGIC/LYMPHATIC NEGATIVE: 0

## 2024-04-16 ASSESSMENT — CONF VISUAL FIELD
OD_SUPERIOR_NASAL_RESTRICTION: 0
OD_SUPERIOR_TEMPORAL_RESTRICTION: 0
OS_INFERIOR_TEMPORAL_RESTRICTION: 0
OS_NORMAL: 1
OS_SUPERIOR_NASAL_RESTRICTION: 0
OD_NORMAL: 1
OS_SUPERIOR_TEMPORAL_RESTRICTION: 0
OD_INFERIOR_NASAL_RESTRICTION: 0
OD_INFERIOR_TEMPORAL_RESTRICTION: 0
OS_INFERIOR_NASAL_RESTRICTION: 0

## 2024-04-16 ASSESSMENT — VISUAL ACUITY
OD_CC: 20/20
OS_CC: 20/20
OD_CC+: -1
OS_CC: J1+
METHOD: SNELLEN - LINEAR
OD_CC: J1+

## 2024-04-16 ASSESSMENT — EXTERNAL EXAM - LEFT EYE: OS_EXAM: NORMAL

## 2024-04-16 ASSESSMENT — EXTERNAL EXAM - RIGHT EYE: OD_EXAM: NORMAL

## 2024-04-16 NOTE — PROGRESS NOTES
Epidemic keratoconjunctivitis (EKC). OD>OS.   Patient also has many small hair shavings (got hair cut with shaver last week and the eyelids and eyelid margins are covered with small hairs that are currently embedded in the skin) Patient advised to clean the skin carefully around the eyes. Previous DrGino Removed hair shavings from the eye as well.  Will defer Povidone Iodine treatment as onset of epidemic keratoconjunctivitis (EKC) was 7 days ago. Patient has pred acetate at home. Start using Q 2 hours. Has Dcd tobramycine gtt and does not want to take these anymore.  Use visine QID as well OD.   RTC 1 day.

## 2024-04-17 ENCOUNTER — APPOINTMENT (OUTPATIENT)
Dept: OPHTHALMOLOGY | Facility: CLINIC | Age: 69
End: 2024-04-17
Payer: COMMERCIAL

## 2024-04-17 ENCOUNTER — OFFICE VISIT (OUTPATIENT)
Dept: OPHTHALMOLOGY | Facility: CLINIC | Age: 69
End: 2024-04-17
Payer: COMMERCIAL

## 2024-04-17 DIAGNOSIS — B30.0 EKC (EPIDEMIC KERATOCONJUNCTIVITIS): Primary | ICD-10-CM

## 2024-04-17 PROCEDURE — 92012 INTRM OPH EXAM EST PATIENT: CPT | Performed by: OPTOMETRIST

## 2024-04-17 RX ORDER — PREDNISOLONE ACETATE 10 MG/ML
1 SUSPENSION/ DROPS OPHTHALMIC 4 TIMES DAILY
Qty: 5 ML | Refills: 1 | Status: SHIPPED | OUTPATIENT
Start: 2024-04-17 | End: 2024-04-25 | Stop reason: SDUPTHER

## 2024-04-17 ASSESSMENT — CONF VISUAL FIELD
OS_NORMAL: 1
OS_SUPERIOR_NASAL_RESTRICTION: 0
OD_INFERIOR_NASAL_RESTRICTION: 0
OS_SUPERIOR_TEMPORAL_RESTRICTION: 0
OS_INFERIOR_NASAL_RESTRICTION: 0
OS_INFERIOR_TEMPORAL_RESTRICTION: 0
OD_NORMAL: 1
OD_SUPERIOR_NASAL_RESTRICTION: 0
OD_INFERIOR_TEMPORAL_RESTRICTION: 0
OD_SUPERIOR_TEMPORAL_RESTRICTION: 0

## 2024-04-17 ASSESSMENT — ENCOUNTER SYMPTOMS
PSYCHIATRIC NEGATIVE: 0
ENDOCRINE NEGATIVE: 0
MUSCULOSKELETAL NEGATIVE: 0
ALLERGIC/IMMUNOLOGIC NEGATIVE: 0
CARDIOVASCULAR NEGATIVE: 0
CONSTITUTIONAL NEGATIVE: 0
RESPIRATORY NEGATIVE: 0
EYES NEGATIVE: 1
HEMATOLOGIC/LYMPHATIC NEGATIVE: 0
NEUROLOGICAL NEGATIVE: 0
GASTROINTESTINAL NEGATIVE: 0

## 2024-04-17 ASSESSMENT — VISUAL ACUITY
OD_CC: 20/20
METHOD: SNELLEN - LINEAR
OS_CC: 20/20
CORRECTION_TYPE: GLASSES

## 2024-04-17 NOTE — PROGRESS NOTES
Epidemic keratoconjunctivitis (EKC). OD>OS.   Patient also has many small hair shavings (got hair cut with shaver last week and the eyelids and eyelid margins are covered with small hairs that are currently embedded in the skin) Patient advised to clean the skin carefully around the eyes. Previous DrGino Removed hair shavings from the eye as well.    Will defer Povidone Iodine treatment as onset of epidemic keratoconjunctivitis (EKC) was 7 days ago. Patient has pred acetate at home. Start using Q 2 hours. Has Dcd tobramycine gtt and does not want to take these anymore.  Use visine QID as well OD.     Back after 1 day. Still redness OD>OS. Clean eyelids with babyshampoo again including up to eye brows.  Use pred acetate Q 3hours today and then QID x 1 week. Then DC. Asked patient to keep me informed of progress. (Checked again today and no embedded hairs in cul de sac or in conjunctiva anywhere).

## 2024-04-18 ENCOUNTER — PHARMACY VISIT (OUTPATIENT)
Dept: PHARMACY | Facility: CLINIC | Age: 69
End: 2024-04-18
Payer: COMMERCIAL

## 2024-04-18 PROCEDURE — RXMED WILLOW AMBULATORY MEDICATION CHARGE

## 2024-04-19 PROCEDURE — RXMED WILLOW AMBULATORY MEDICATION CHARGE

## 2024-04-20 ENCOUNTER — PHARMACY VISIT (OUTPATIENT)
Dept: PHARMACY | Facility: CLINIC | Age: 69
End: 2024-04-20
Payer: COMMERCIAL

## 2024-04-24 ENCOUNTER — APPOINTMENT (OUTPATIENT)
Dept: VASCULAR SURGERY | Facility: CLINIC | Age: 69
End: 2024-04-24
Payer: COMMERCIAL

## 2024-04-24 NOTE — PROGRESS NOTES
Subjective   Patient ID: Robbie Tovar is a 68 y.o. male.      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 Pillo Antoine ([facility]) who referred pt to see a uveitis specialist for eval, Pt currently using Pred Q1hr OD, Moxi QID OD, Lumify PRN OD, PT reports that OD is feeling much better and the redness had cleared up immensely, PT states the eye initially looked bloody and is only looking a little red now, PT states eye feels weird from putting in all of the steroid gtt,   Last edited by Lori Hernandez on 4/25/2024  3:22 PM.        Current Outpatient Medications (Ophthalmology pharm classes)   Medication Sig Dispense Refill    moxifloxacin (Vigamox) 0.5 % ophthalmic solution Use 1 Drop in the right eye four times daily. 3 mL 0    neomycin-polymyxin-HC (Cortisporin) 3.5-10,000-10 mg-unit-mg/mL ophthalmic suspension Instill 2 drops into the affected eye every 4 hours while awake 7.5 mL 0    prednisoLONE acetate (Pred-Forte) 1 % ophthalmic suspension Administer 1 drop into both eyes every 2 hours while awake for 14 days. 5 mL 2    tobramycin-dexamethasone (Tobradex) ophthalmic suspension Instill 1 drop in right eye four times a day for 3 days, then three times a day for 2 days, then twice daily for 1 day 5 mL 0     Current Outpatient Medications (Other)   Medication Sig Dispense Refill    alpha lipoic acid 50 mg capsule Take by mouth.      azithromycin (Zithromax) 250 mg tablet Take as directed on package 6 tablet 0    celecoxib (CeleBREX) 200 mg capsule Take 1 capsule (200 mg) by mouth 2 times a day as needed for mild pain (1 - 3) 60 capsule 2    ciclopirox (Loprox) 0.77 % cream APPLY 1 APPLICATION TO AFFECTED AREA TWICE DAILY. 90 g 3    fluticasone (Flonase) 50 mcg/actuation nasal spray Administer 2 sprays into each nostril once daily.      fluticasone (Flonase) 50 mcg/actuation nasal spray Administer 2 sprays into each nostril once daily 16 g 0    hydroCHLOROthiazide  (HYDRODiuril) 12.5 mg tablet Take 1 tablet (12.5 mg) by mouth once daily. (Patient not taking: Reported on 3/12/2024) 90 tablet 1    hydrocortisone 2.5 % cream APPLY TO AFFECTED AREA(S) OF ANGLES OF MOUTH (MIXED WITH KETOCONAZOLE CREAM) TWO TIMES A DAY FOR 2 WEEKS. REPEAT AS NEEDED FLARE (Patient not taking: Reported on 10/9/2023) 30 g 2    methylPREDNISolone (Medrol Dospak) 4 mg tablets Take as directed on package 21 tablet 0    pantoprazole (ProtoNix) 40 mg EC tablet Take 1 tablet (40 mg) by mouth once every 24 hours. 90 tablet 3    vit B complex no.12/niacin,B3, (VITAMIN B COMPLEX NO.12-NIACIN ORAL) Take by mouth.      zolpidem (Ambien) 10 mg tablet TAKE 1 TABLET (10 MG) BY MOUTH AS NEEDED AT BEDTIME FOR SLEEP. 30 tablet 2       Objective   Base Eye Exam       Visual Acuity (Snellen - Linear)         Right Left    Dist cc 20/30 -2 20/20    Dist ph cc 20/25 -1       Correction: Glasses              Tonometry (Goldmann Applanation, 3:29 PM)         Right Left    Pressure 21               Pupils         Pupils Light    Right PERRL, No APD 2    Left PERRL, No APD 3              Visual Fields (Counting fingers)         Right Left     Full Full              Extraocular Movement         Right Left     Full Full              Neuro/Psych       Oriented x3: Yes    Mood/Affect: Normal              Dilation       Right eye: 1% Mydriacyl & 2.5% Timur  @ 3:30 PM                  Slit Lamp and Fundus Exam       External Exam         Right Left    External Normal Normal              Slit Lamp Exam         Right Left    Lids/Lashes fine micro hairs eyelid margins fine micro hairs surrounding eyelid margins    Conjunctiva/Sclera 1+ injection after dilating drops White and quiet    Cornea (-) infiltrate, abrasion, dendrite Clear    Anterior Chamber Deep and quiet Deep and quiet    Iris Round and reactive Round and reactive    Lens Clear Clear    Anterior Vitreous Normal undilated              Fundus Exam         Right Left    Disc  Normal     Macula Normal     Vessels Normal     Periphery 360 shallow choroidals, some overlying retinal folds                     Assessment/Plan       Initially saw outside optometrist for red painful eye, thought to be foreign body irritation secondary to hair, underwent removal at slit lamp.     Saw Dr. Calhoun 4/16 and 4/17, diagnosed as epidemic keratoconjunctivitis (EKC) right eye>left eye. Was started on PF q2h.     Saw Dr. Antoine 4/19, diagnosed as scleritis, started on PF q1h, moxi QID.    TODAY 04/28/24     +joint pain (arthritis)  + sinus infection 3-4 weeks ago  + no ulcers    #Scleritis right eye  -Diagnosed 1 week ago [facility], has been on PF q1h and moxi QID  -Reports subjective improvement, and has improved scleral injection today  -DFE notable for 360 choroidals, intraocular pressure (IOP) 21  -Obtain laboratory workup : CBC/CMP, ESR/CRP, antinuclear antibodies test (RIGO), anti-dsDNA, RF, anti-neutrophil cytoplasmic antibodies (ANCA), syphilis, TB, ACE/Lysozyme  -Could consider UA and CXR next  -Plan for [facility] fluorescein angiography (FA) next visit  -Consider neuroimaging if fails to fully resolve or any neuro symptoms  -Will taper PF to q2h for 4 days, then QID for 1 week, then TID until f/up  -Can d/c moxi  -F/up 2-3 weeks, sooner PRM    Going to [place] in 1 month

## 2024-04-25 ENCOUNTER — OFFICE VISIT (OUTPATIENT)
Dept: OPHTHALMOLOGY | Facility: CLINIC | Age: 69
End: 2024-04-25
Payer: COMMERCIAL

## 2024-04-25 DIAGNOSIS — H15.001 SCLERITIS, RIGHT: Primary | ICD-10-CM

## 2024-04-25 DIAGNOSIS — B30.0 EKC (EPIDEMIC KERATOCONJUNCTIVITIS): ICD-10-CM

## 2024-04-25 PROCEDURE — 99213 OFFICE O/P EST LOW 20 MIN: CPT | Performed by: OPHTHALMOLOGY

## 2024-04-25 PROCEDURE — 92134 CPTRZ OPH DX IMG PST SGM RTA: CPT | Performed by: OPHTHALMOLOGY

## 2024-04-25 RX ORDER — PREDNISOLONE ACETATE 10 MG/ML
1 SUSPENSION/ DROPS OPHTHALMIC
Qty: 5 ML | Refills: 2 | Status: SHIPPED | OUTPATIENT
Start: 2024-04-25 | End: 2024-05-12

## 2024-04-25 RX ORDER — MOXIFLOXACIN 5 MG/ML
SOLUTION/ DROPS OPHTHALMIC
Qty: 3 ML | Refills: 0 | Status: SHIPPED | OUTPATIENT
Start: 2024-04-25 | End: 2024-05-10

## 2024-04-25 ASSESSMENT — CONF VISUAL FIELD
OS_SUPERIOR_TEMPORAL_RESTRICTION: 0
OD_INFERIOR_TEMPORAL_RESTRICTION: 0
OS_INFERIOR_NASAL_RESTRICTION: 0
OS_SUPERIOR_NASAL_RESTRICTION: 0
OS_INFERIOR_TEMPORAL_RESTRICTION: 0
OD_SUPERIOR_NASAL_RESTRICTION: 0
OD_SUPERIOR_TEMPORAL_RESTRICTION: 0
OS_NORMAL: 1
METHOD: COUNTING FINGERS
OD_INFERIOR_NASAL_RESTRICTION: 0
OD_NORMAL: 1

## 2024-04-25 ASSESSMENT — ENCOUNTER SYMPTOMS
CARDIOVASCULAR NEGATIVE: 0
RESPIRATORY NEGATIVE: 0
ENDOCRINE NEGATIVE: 0
EYES NEGATIVE: 1
ALLERGIC/IMMUNOLOGIC NEGATIVE: 0
CONSTITUTIONAL NEGATIVE: 0
HEMATOLOGIC/LYMPHATIC NEGATIVE: 0
NEUROLOGICAL NEGATIVE: 0
MUSCULOSKELETAL NEGATIVE: 0
GASTROINTESTINAL NEGATIVE: 0
PSYCHIATRIC NEGATIVE: 0

## 2024-04-25 ASSESSMENT — EXTERNAL EXAM - LEFT EYE: OS_EXAM: NORMAL

## 2024-04-25 ASSESSMENT — VISUAL ACUITY
OD_PH_CC+: -1
OD_CC+: -2
METHOD: SNELLEN - LINEAR
OD_CC: 20/30
OS_CC: 20/20
OD_PH_CC: 20/25
CORRECTION_TYPE: GLASSES

## 2024-04-25 ASSESSMENT — TONOMETRY
IOP_METHOD: GOLDMANN APPLANATION
OD_IOP_MMHG: 21

## 2024-04-25 ASSESSMENT — EXTERNAL EXAM - RIGHT EYE: OD_EXAM: NORMAL

## 2024-04-26 ENCOUNTER — APPOINTMENT (OUTPATIENT)
Dept: RADIOLOGY | Facility: CLINIC | Age: 69
End: 2024-04-26
Payer: COMMERCIAL

## 2024-04-26 ENCOUNTER — HOSPITAL ENCOUNTER (OUTPATIENT)
Dept: RADIOLOGY | Facility: CLINIC | Age: 69
Discharge: HOME | End: 2024-04-26
Payer: COMMERCIAL

## 2024-04-26 ENCOUNTER — HOSPITAL ENCOUNTER (OUTPATIENT)
Dept: PAIN MEDICINE | Facility: CLINIC | Age: 69
Discharge: HOME | End: 2024-04-26
Payer: COMMERCIAL

## 2024-04-26 ENCOUNTER — OFFICE VISIT (OUTPATIENT)
Dept: PAIN MEDICINE | Facility: CLINIC | Age: 69
End: 2024-04-26
Payer: COMMERCIAL

## 2024-04-26 ENCOUNTER — APPOINTMENT (OUTPATIENT)
Dept: RADIOLOGY | Facility: HOSPITAL | Age: 69
End: 2024-04-26
Payer: COMMERCIAL

## 2024-04-26 VITALS
SYSTOLIC BLOOD PRESSURE: 133 MMHG | HEART RATE: 76 BPM | TEMPERATURE: 97.5 F | HEIGHT: 73 IN | RESPIRATION RATE: 15 BRPM | OXYGEN SATURATION: 97 % | WEIGHT: 225 LBS | DIASTOLIC BLOOD PRESSURE: 81 MMHG | BODY MASS INDEX: 29.82 KG/M2

## 2024-04-26 DIAGNOSIS — M54.16 LUMBAR NEURITIS: ICD-10-CM

## 2024-04-26 DIAGNOSIS — M54.16 LUMBAR NEURITIS: Primary | ICD-10-CM

## 2024-04-26 PROCEDURE — 2500000004 HC RX 250 GENERAL PHARMACY W/ HCPCS (ALT 636 FOR OP/ED)

## 2024-04-26 PROCEDURE — A4216 STERILE WATER/SALINE, 10 ML: HCPCS

## 2024-04-26 PROCEDURE — 2500000005 HC RX 250 GENERAL PHARMACY W/O HCPCS

## 2024-04-26 PROCEDURE — 62323 NJX INTERLAMINAR LMBR/SAC: CPT

## 2024-04-26 PROCEDURE — 2500000006 HC RX 250 W HCPCS SELF ADMINISTERED DRUGS (ALT 637 FOR ALL PAYERS)

## 2024-04-26 RX ORDER — TRIAMCINOLONE ACETONIDE 40 MG/ML
INJECTION, SUSPENSION INTRA-ARTICULAR; INTRAMUSCULAR
Status: COMPLETED
Start: 2024-04-26 | End: 2024-04-26

## 2024-04-26 RX ORDER — SODIUM CHLORIDE 9 MG/ML
INJECTION, SOLUTION INTRAMUSCULAR; INTRAVENOUS; SUBCUTANEOUS
Status: COMPLETED
Start: 2024-04-26 | End: 2024-04-26

## 2024-04-26 RX ORDER — LIDOCAINE HYDROCHLORIDE 5 MG/ML
INJECTION, SOLUTION INFILTRATION; INTRAVENOUS
Status: COMPLETED
Start: 2024-04-26 | End: 2024-04-26

## 2024-04-26 RX ORDER — DIAZEPAM 5 MG/1
5 TABLET ORAL ONCE
Status: SHIPPED | OUTPATIENT
Start: 2024-04-26

## 2024-04-26 RX ORDER — DIAZEPAM 5 MG/1
TABLET ORAL
Status: COMPLETED
Start: 2024-04-26 | End: 2024-04-26

## 2024-04-26 RX ADMIN — SODIUM CHLORIDE 10 ML: 9 INJECTION, SOLUTION INTRAMUSCULAR; INTRAVENOUS; SUBCUTANEOUS at 13:30

## 2024-04-26 RX ADMIN — DIAZEPAM 5 MG: 5 TABLET ORAL at 13:26

## 2024-04-26 RX ADMIN — TRIAMCINOLONE ACETONIDE 40 MG: 40 INJECTION, SUSPENSION INTRA-ARTICULAR; INTRAMUSCULAR at 13:30

## 2024-04-26 RX ADMIN — LIDOCAINE HYDROCHLORIDE 250 MG: 5 INJECTION, SOLUTION INFILTRATION at 13:30

## 2024-04-26 ASSESSMENT — ENCOUNTER SYMPTOMS
OCCASIONAL FEELINGS OF UNSTEADINESS: 0
DEPRESSION: 0
LOSS OF SENSATION IN FEET: 0

## 2024-04-26 ASSESSMENT — PAIN DESCRIPTION - DESCRIPTORS: DESCRIPTORS: BURNING

## 2024-04-26 ASSESSMENT — PAIN SCALES - GENERAL
PAINLEVEL_OUTOF10: 3
PAINLEVEL_OUTOF10: 6

## 2024-04-26 ASSESSMENT — PAIN - FUNCTIONAL ASSESSMENT: PAIN_FUNCTIONAL_ASSESSMENT: 0-10

## 2024-04-26 ASSESSMENT — PATIENT HEALTH QUESTIONNAIRE - PHQ9
2. FEELING DOWN, DEPRESSED OR HOPELESS: NOT AT ALL
1. LITTLE INTEREST OR PLEASURE IN DOING THINGS: NOT AT ALL
SUM OF ALL RESPONSES TO PHQ9 QUESTIONS 1 AND 2: 0

## 2024-04-26 NOTE — OP NOTE
4/26/2024    Pre procedure Diagnosis: Lumbar neuritis  Post procedure Diagnosis: Lumbar neuritis    Procedure:     1. L5/S1 interlaminar epidural steroid injection   2. Fluoroscopic guidance     Complications: None    Assistants: None     EBL: None    PROCEDURE: The patient was identified in the preoperative area. After risks and benefits were explained, informed consent was obtained. The patient was brought back to the operating room and placed in the prone position on the operating table. Standard ASA monitors were applied and monitored throughout the procedure. Their vital signs remained stable throughout the procedure. The patient's lumbosacral spine was prepped and draped in usual sterile fashion. Using fluoroscopic guidance the skin overlying the trajectory to the L5/S1 space was anesthetized with a total of 5 ml of 0.5% Lidocaine. Thereafter, a 3.5 in long 20G Touhy needle was advanced through the anesthitized skin. Then, the needle was advanced into the L5/S1 ligamentum flavum under multiplanar fluoroscopic guidance.  Then using a loss of resistance syringe and technique the epidural space was accessed. After negative aspiration for heme, or CSF a total of 4mL of Preservative Free Normal Saline and 40 mg of KENALOG was injected. The needle was removed and a sterile dressing was applied. The patient tolerated the procedure well and was transported to PACU in good condition.    PLAN: The pt will follow up in the office in one to two months to report their results with the procedure. Discharge instructions were reviewed in recovery and provided to the patient in writing. They were advised to call should they have any questions or concerns.

## 2024-04-28 PROBLEM — H15.001 SCLERITIS, RIGHT: Status: ACTIVE | Noted: 2024-04-28

## 2024-04-29 PROCEDURE — RXMED WILLOW AMBULATORY MEDICATION CHARGE

## 2024-04-30 ENCOUNTER — PHARMACY VISIT (OUTPATIENT)
Dept: PHARMACY | Facility: CLINIC | Age: 69
End: 2024-04-30
Payer: COMMERCIAL

## 2024-05-06 ENCOUNTER — LAB (OUTPATIENT)
Dept: LAB | Facility: LAB | Age: 69
End: 2024-05-06
Payer: COMMERCIAL

## 2024-05-06 DIAGNOSIS — G47.00 INSOMNIA, UNSPECIFIED TYPE: ICD-10-CM

## 2024-05-06 DIAGNOSIS — H15.001 SCLERITIS, RIGHT: ICD-10-CM

## 2024-05-06 DIAGNOSIS — Z12.5 SCREENING FOR PROSTATE CANCER: ICD-10-CM

## 2024-05-06 DIAGNOSIS — Z00.00 HEALTH CARE MAINTENANCE: ICD-10-CM

## 2024-05-06 DIAGNOSIS — Z00.00 HEALTHCARE MAINTENANCE: ICD-10-CM

## 2024-05-06 LAB
ALBUMIN SERPL BCP-MCNC: 4.1 G/DL (ref 3.4–5)
ALP SERPL-CCNC: 74 U/L (ref 33–136)
ALT SERPL W P-5'-P-CCNC: 19 U/L (ref 10–52)
AMPHETAMINES UR QL SCN: NORMAL
ANION GAP SERPL CALC-SCNC: 11 MMOL/L (ref 10–20)
AST SERPL W P-5'-P-CCNC: 15 U/L (ref 9–39)
BARBITURATES UR QL SCN: NORMAL
BASOPHILS # BLD AUTO: 0.05 X10*3/UL (ref 0–0.1)
BASOPHILS NFR BLD AUTO: 0.5 %
BILIRUB SERPL-MCNC: 0.5 MG/DL (ref 0–1.2)
BUN SERPL-MCNC: 20 MG/DL (ref 6–23)
BZE UR QL SCN: NORMAL
CALCIUM SERPL-MCNC: 8.7 MG/DL (ref 8.6–10.3)
CANNABINOIDS UR QL SCN: NORMAL
CHLORIDE SERPL-SCNC: 104 MMOL/L (ref 98–107)
CHOLEST SERPL-MCNC: 157 MG/DL (ref 0–199)
CHOLESTEROL/HDL RATIO: 3.3
CO2 SERPL-SCNC: 29 MMOL/L (ref 21–32)
CREAT SERPL-MCNC: 0.76 MG/DL (ref 0.5–1.3)
CREAT UR-MCNC: 58.6 MG/DL (ref 20–370)
CRP SERPL-MCNC: 0.48 MG/DL
DSDNA AB SER-ACNC: 1 IU/ML
EGFRCR SERPLBLD CKD-EPI 2021: >90 ML/MIN/1.73M*2
EOSINOPHIL # BLD AUTO: 0.19 X10*3/UL (ref 0–0.7)
EOSINOPHIL NFR BLD AUTO: 1.7 %
ERYTHROCYTE [DISTWIDTH] IN BLOOD BY AUTOMATED COUNT: 12.4 % (ref 11.5–14.5)
ERYTHROCYTE [SEDIMENTATION RATE] IN BLOOD BY WESTERGREN METHOD: 8 MM/H (ref 0–20)
EST. AVERAGE GLUCOSE BLD GHB EST-MCNC: 123 MG/DL
GLUCOSE SERPL-MCNC: 95 MG/DL (ref 74–99)
HBA1C MFR BLD: 5.9 %
HCT VFR BLD AUTO: 46.5 % (ref 41–52)
HDLC SERPL-MCNC: 47.3 MG/DL
HGB BLD-MCNC: 15.8 G/DL (ref 13.5–17.5)
IMM GRANULOCYTES # BLD AUTO: 0.04 X10*3/UL (ref 0–0.7)
IMM GRANULOCYTES NFR BLD AUTO: 0.4 % (ref 0–0.9)
LDLC SERPL CALC-MCNC: 97 MG/DL
LYMPHOCYTES # BLD AUTO: 2.36 X10*3/UL (ref 1.2–4.8)
LYMPHOCYTES NFR BLD AUTO: 21.7 %
MCH RBC QN AUTO: 30.1 PG (ref 26–34)
MCHC RBC AUTO-ENTMCNC: 34 G/DL (ref 32–36)
MCV RBC AUTO: 89 FL (ref 80–100)
MONOCYTES # BLD AUTO: 0.9 X10*3/UL (ref 0.1–1)
MONOCYTES NFR BLD AUTO: 8.3 %
NEUTROPHILS # BLD AUTO: 7.33 X10*3/UL (ref 1.2–7.7)
NEUTROPHILS NFR BLD AUTO: 67.4 %
NON HDL CHOLESTEROL: 110 MG/DL (ref 0–149)
NRBC BLD-RTO: 0 /100 WBCS (ref 0–0)
PCP UR QL SCN: NORMAL
PLATELET # BLD AUTO: 186 X10*3/UL (ref 150–450)
POTASSIUM SERPL-SCNC: 4.1 MMOL/L (ref 3.5–5.3)
PROT SERPL-MCNC: 6.2 G/DL (ref 6.4–8.2)
PSA SERPL-MCNC: 0.48 NG/ML
RBC # BLD AUTO: 5.25 X10*6/UL (ref 4.5–5.9)
RHEUMATOID FACT SER NEPH-ACNC: <10 IU/ML (ref 0–15)
SODIUM SERPL-SCNC: 140 MMOL/L (ref 136–145)
TREPONEMA PALLIDUM IGG+IGM AB [PRESENCE] IN SERUM OR PLASMA BY IMMUNOASSAY: NONREACTIVE
TRIGL SERPL-MCNC: 66 MG/DL (ref 0–149)
TSH SERPL-ACNC: 1.58 MIU/L (ref 0.44–3.98)
VLDL: 13 MG/DL (ref 0–40)
WBC # BLD AUTO: 10.9 X10*3/UL (ref 4.4–11.3)

## 2024-05-06 PROCEDURE — 86481 TB AG RESPONSE T-CELL SUSP: CPT

## 2024-05-06 PROCEDURE — 84443 ASSAY THYROID STIM HORMONE: CPT

## 2024-05-06 PROCEDURE — 80354 DRUG SCREENING FENTANYL: CPT

## 2024-05-06 PROCEDURE — 86431 RHEUMATOID FACTOR QUANT: CPT

## 2024-05-06 PROCEDURE — 86036 ANCA SCREEN EACH ANTIBODY: CPT

## 2024-05-06 PROCEDURE — 85549 MURAMIDASE: CPT

## 2024-05-06 PROCEDURE — 80373 DRUG SCREENING TRAMADOL: CPT

## 2024-05-06 PROCEDURE — 85652 RBC SED RATE AUTOMATED: CPT

## 2024-05-06 PROCEDURE — 86140 C-REACTIVE PROTEIN: CPT

## 2024-05-06 PROCEDURE — 80061 LIPID PANEL: CPT

## 2024-05-06 PROCEDURE — 80368 SEDATIVE HYPNOTICS: CPT

## 2024-05-06 PROCEDURE — 85025 COMPLETE CBC W/AUTO DIFF WBC: CPT

## 2024-05-06 PROCEDURE — 86038 ANTINUCLEAR ANTIBODIES: CPT

## 2024-05-06 PROCEDURE — 80361 OPIATES 1 OR MORE: CPT

## 2024-05-06 PROCEDURE — 82164 ANGIOTENSIN I ENZYME TEST: CPT

## 2024-05-06 PROCEDURE — 36415 COLL VENOUS BLD VENIPUNCTURE: CPT

## 2024-05-06 PROCEDURE — 82570 ASSAY OF URINE CREATININE: CPT

## 2024-05-06 PROCEDURE — 83516 IMMUNOASSAY NONANTIBODY: CPT

## 2024-05-06 PROCEDURE — 84153 ASSAY OF PSA TOTAL: CPT

## 2024-05-06 PROCEDURE — 80053 COMPREHEN METABOLIC PANEL: CPT

## 2024-05-06 PROCEDURE — 83036 HEMOGLOBIN GLYCOSYLATED A1C: CPT

## 2024-05-06 PROCEDURE — 80307 DRUG TEST PRSMV CHEM ANLYZR: CPT

## 2024-05-06 PROCEDURE — 86780 TREPONEMA PALLIDUM: CPT

## 2024-05-06 PROCEDURE — 80346 BENZODIAZEPINES1-12: CPT

## 2024-05-06 PROCEDURE — 86225 DNA ANTIBODY NATIVE: CPT

## 2024-05-06 PROCEDURE — 80358 DRUG SCREENING METHADONE: CPT

## 2024-05-06 PROCEDURE — 80365 DRUG SCREENING OXYCODONE: CPT

## 2024-05-08 ENCOUNTER — PHARMACY VISIT (OUTPATIENT)
Dept: PHARMACY | Facility: CLINIC | Age: 69
End: 2024-05-08
Payer: COMMERCIAL

## 2024-05-08 LAB
ACE SERPL-CCNC: 29 U/L (ref 16–85)
NIL(NEG) CONTROL SPOT COUNT: NORMAL
PANEL A SPOT COUNT: 1
PANEL B SPOT COUNT: 1
POS CONTROL SPOT COUNT: NORMAL
T-SPOT. TB INTERPRETATION: NEGATIVE

## 2024-05-08 PROCEDURE — RXMED WILLOW AMBULATORY MEDICATION CHARGE

## 2024-05-09 ENCOUNTER — APPOINTMENT (OUTPATIENT)
Dept: PAIN MEDICINE | Facility: CLINIC | Age: 69
End: 2024-05-09
Payer: COMMERCIAL

## 2024-05-09 LAB
1OH-MIDAZOLAM UR CFM-MCNC: <25 NG/ML
6MAM UR CFM-MCNC: <25 NG/ML
7AMINOCLONAZEPAM UR CFM-MCNC: <25 NG/ML
A-OH ALPRAZ UR CFM-MCNC: <25 NG/ML
ALPRAZ UR CFM-MCNC: <25 NG/ML
ANA SER QL HEP2 SUBST: NEGATIVE
ANCA AB PATTERN SER IF-IMP: NORMAL
ANCA IGG TITR SER IF: NORMAL {TITER}
CHLORDIAZEP UR CFM-MCNC: <25 NG/ML
CLONAZEPAM UR CFM-MCNC: <25 NG/ML
CODEINE UR CFM-MCNC: <50 NG/ML
DIAZEPAM UR CFM-MCNC: <25 NG/ML
EDDP UR CFM-MCNC: <25 NG/ML
FENTANYL UR CFM-MCNC: <2.5 NG/ML
HYDROCODONE CTO UR CFM-MCNC: <25 NG/ML
HYDROMORPHONE UR CFM-MCNC: <25 NG/ML
LORAZEPAM UR CFM-MCNC: <25 NG/ML
METHADONE UR CFM-MCNC: <25 NG/ML
MIDAZOLAM UR CFM-MCNC: <25 NG/ML
MORPHINE UR CFM-MCNC: <50 NG/ML
MYELOPEROXIDASE AB SER-ACNC: 0 AU/ML (ref 0–19)
NORDIAZEPAM UR CFM-MCNC: <25 NG/ML
NORFENTANYL UR CFM-MCNC: <2.5 NG/ML
NORHYDROCODONE UR CFM-MCNC: <25 NG/ML
NOROXYCODONE UR CFM-MCNC: <25 NG/ML
NORTRAMADOL UR-MCNC: <50 NG/ML
OXAZEPAM UR CFM-MCNC: <25 NG/ML
OXYCODONE UR CFM-MCNC: <25 NG/ML
OXYMORPHONE UR CFM-MCNC: <25 NG/ML
PROTEINASE3 AB SER-ACNC: 1 AU/ML (ref 0–19)
TEMAZEPAM UR CFM-MCNC: <25 NG/ML
TRAMADOL UR CFM-MCNC: <50 NG/ML
ZOLPIDEM UR CFM-MCNC: 652 NG/ML
ZOLPIDEM UR-MCNC: <25 NG/ML

## 2024-05-12 LAB — LYSOZYME SERPL-MCNC: 0.79 UG/ML

## 2024-05-14 ENCOUNTER — OFFICE VISIT (OUTPATIENT)
Dept: PAIN MEDICINE | Facility: CLINIC | Age: 69
End: 2024-05-14
Payer: COMMERCIAL

## 2024-05-14 VITALS
DIASTOLIC BLOOD PRESSURE: 85 MMHG | TEMPERATURE: 97.7 F | SYSTOLIC BLOOD PRESSURE: 152 MMHG | WEIGHT: 225 LBS | BODY MASS INDEX: 29.69 KG/M2 | RESPIRATION RATE: 15 BRPM | HEART RATE: 89 BPM

## 2024-05-14 DIAGNOSIS — G89.29 CHRONIC BILATERAL LOW BACK PAIN WITH BILATERAL SCIATICA: ICD-10-CM

## 2024-05-14 DIAGNOSIS — M54.41 CHRONIC BILATERAL LOW BACK PAIN WITH BILATERAL SCIATICA: ICD-10-CM

## 2024-05-14 DIAGNOSIS — M51.26 LUMBAR DISC HERNIATION: Primary | ICD-10-CM

## 2024-05-14 DIAGNOSIS — M54.42 CHRONIC BILATERAL LOW BACK PAIN WITH BILATERAL SCIATICA: ICD-10-CM

## 2024-05-14 DIAGNOSIS — M54.16 LUMBAR NEURITIS: ICD-10-CM

## 2024-05-14 PROCEDURE — 99214 OFFICE O/P EST MOD 30 MIN: CPT | Performed by: ANESTHESIOLOGY

## 2024-05-14 ASSESSMENT — ENCOUNTER SYMPTOMS
LOSS OF SENSATION IN FEET: 0
OCCASIONAL FEELINGS OF UNSTEADINESS: 0

## 2024-05-14 ASSESSMENT — PAIN SCALES - GENERAL: PAINLEVEL: 7

## 2024-05-14 NOTE — PROGRESS NOTES
History Of Present Illness  Robbie Tovar is a 69 y.o. male presenting with   Chief Complaint   Patient presents with    Pain       Patient presents with left knee pain that is a 7-8/10 and previously presented chronic left sided low back pain to the LLE. The pain is constant, worse with bending activity and better with rest/sitting. The pain is an aching and throbbing like sensation towards the LEFT SIDE . Denies LE paresthesias,  saddle anesthesia, bowel or bladder incontinence. To manage this pain the patient has attempted CELEBREX with 30-60% relief at times.  The patients chronic GERD, allergies, insomnia are stable on medication management.     PAIN SCORE: 7-8/10.       PP: Dr. Shirley, Dr. Lee     Past Medical History  He has a past medical history of Chronic sinusitis, unspecified (09/21/2016), Plantar fascial fibromatosis (09/21/2016), and Primary insomnia (09/21/2016).    Surgical History  He has a past surgical history that includes Other surgical history (05/18/2021); Other surgical history (05/18/2021); and Other surgical history (05/12/2020).     Social History  He reports that he has never smoked. He has never used smokeless tobacco. He reports that he does not drink alcohol and does not use drugs.    Family History  Family History   Problem Relation Name Age of Onset    Allergies Mother      Other (cardiac disorder) Mother          stent 70s    Hypertension Mother      Hypertension Father      Allergies Sister      Glaucoma Neg Hx      Macular degeneration Neg Hx          Allergies  Iodinated contrast media    Review of Systems    All other systems reviewed and negative for any deficits. Pertinent positives and negatives were considered in the medical decision making process.        Physical Exam  /85   Pulse 89   Temp 36.5 °C (97.7 °F)   Resp 15   Wt 102 kg (225 lb)     General: Pt appears stated age    Eyes: Conjunctiva non-icteric and lids without obvious rash or drooping. Pupils are  symmetric.     Conjunctiva injected    ENT: External ears and nose appear to be without deformity or rash. No lesions or masses noted. Hearing is grossly intact    Neck: No JVD noted, tracheal position midline. No goiter noted on assessment of thyroid    Respiratory: No gasping or shortness of breath noted, no use of accessory muscles noted    Cardiovascular: Extremities show no edema or varicosities    Skin: No rashes or open lesions/ulcers identified on skin. No induration/tightening noted with palpation of skin    Musculoskeletal: Gait is grossly normal    Digits/nails show no clubbing or cyanosis    Exam of muscles/joints/bones shows no gross atrophy and no abnormal/involuntary movements in the head/neckNo asymmetry or masses noted in the head/neck    Stability: no subluxation noted on movement of bilateral upper extremities or head/neck    Strength: 4/5 in RLE and 5/5 LLE     Range of Motion: WNL     Sensation: DEC HIP FLEXION IN RLE     Cranial nerves 2-12 are grossly intact    Psychiatric: Pt is alert and oriented to time, place and person.         Assessment/Plan   1. Lumbar disc herniation        2. Lumbar neuritis        3. Chronic bilateral low back pain with bilateral sciatica               1. I have previously provided the patient with a list of physical therapy exercises to learn and perform to strengthen core, maintain stabilization, and reduce pain. We reviewed the exercises in detail and I encouraged them to perform them on a regular basis.    2. I would recommend the pt continue Celebrex to help with nerve related pain. We discussed the risks, benefits, and side effects to this medication including the mechanism of action and the pt understands.     3. I also reviewed the patients MRI findings in detail, including review of the actual images and provided a detailed explanation of the findings using a spine model.     There is noted facet degenerative changes at the L4/5 and L5/S1 level with mild  canal stenosis.     There is a disc herniation at the L4/5 and more prominently at the L5/S1 level.     4.  The patient is a candidate for an LESI L5/S1 to treat low back and radicular pain. I spent time with the patient discussing all of the risks, benefits, and alternatives to this measure. Including but not limited to worsening pain with injection, no improvement with pain, numbness in the lower extremity, vagal reaction, hypotension, feeling lightheaded/dizzy, spinal infection, epidural hematoma/abscess, paralysis, nerve injury, steroid effects, and spinal headache.The patient understands ALL of these risks and agrees to proceed with the planned procedure.    Will schedule with Valium 10mg PO.     His lumbar injection was on 4- and he reported ~50% relief of his pain.     Ar Jean MD    I spent time with the patient reviewing their imaging and discussing the risks benefits and alternatives to the above plan. A total of 30 minutes was spent reviewing the data and greater than 50% of that time was with the patient during the face to face encounter discussing treatment options both surgical, non-surgical, and minimally invasive techniques.       DATE OF PROCEDURE: 5/14/2024    PRE-PROCEDURE DIAGNOSIS: LEFT nee osteoarthritis    POST-PROCEDURE DIAGNOSIS: Same as above    PROCEDURE:    LEFT knee joint steroid injection    ANESTHESIA: Local    COMPLICATIONS: None    ATTENDING PHYSICIAN: Ar Jean M.D.    CLINICAL DATA: The patient is a 69 y.o. year old male presenting with a history of pain to the LEFT KNEE. The patient also has X-ray evidence of bilateral knee osteoarthritis.    PROCEDURE: The patient was identified in the preop area. After risks and benefits were discussed, informed consent was obtained. The patient was brought back to the procedure room and placed in the supine position. The patient's LEFT knee were prepped and draped in usual sterile fashion. The skin overlying the trajectory to  the knee joint was anesthetized with a total of 5ml of 0.5% lidocaine via a one inch long 25 G needle. Next the joint was accessed with a 22G spinal needle using the superior lateral approach. After negative aspiration for heme a total of 4ml of 0.5% Ropivacaine and 40mg of kenalog was injected. The needle was removed and a sterile dressing was applied. The patient tolerated the procedure well and was discharged home in good condition.    PLAN: The pt will follow up in the office in one to two months to report their results with the procedure. Discharge instructions were reviewed in recovery and provided to the patient in writing. They were advised to call should they have any questions or concerns.

## 2024-05-16 ENCOUNTER — OFFICE VISIT (OUTPATIENT)
Dept: OPHTHALMOLOGY | Facility: CLINIC | Age: 69
End: 2024-05-16
Payer: COMMERCIAL

## 2024-05-16 DIAGNOSIS — H15.001 SCLERITIS, RIGHT: Primary | ICD-10-CM

## 2024-05-16 PROCEDURE — RXMED WILLOW AMBULATORY MEDICATION CHARGE

## 2024-05-16 PROCEDURE — 99213 OFFICE O/P EST LOW 20 MIN: CPT | Performed by: OPHTHALMOLOGY

## 2024-05-16 PROCEDURE — 92134 CPTRZ OPH DX IMG PST SGM RTA: CPT | Performed by: OPHTHALMOLOGY

## 2024-05-16 ASSESSMENT — ENCOUNTER SYMPTOMS
CARDIOVASCULAR NEGATIVE: 0
MUSCULOSKELETAL NEGATIVE: 0
RESPIRATORY NEGATIVE: 0
ALLERGIC/IMMUNOLOGIC NEGATIVE: 0
CONSTITUTIONAL NEGATIVE: 0
ENDOCRINE NEGATIVE: 0
GASTROINTESTINAL NEGATIVE: 0
EYES NEGATIVE: 1
NEUROLOGICAL NEGATIVE: 0
PSYCHIATRIC NEGATIVE: 0
HEMATOLOGIC/LYMPHATIC NEGATIVE: 0

## 2024-05-16 ASSESSMENT — VISUAL ACUITY
OD_CC+: -2
CORRECTION_TYPE: GLASSES
OD_CC: 20/20
METHOD: SNELLEN - LINEAR
OS_CC: 20/20

## 2024-05-16 ASSESSMENT — TONOMETRY
OD_IOP_MMHG: 18
IOP_METHOD: TONOPEN

## 2024-05-16 ASSESSMENT — EXTERNAL EXAM - LEFT EYE: OS_EXAM: NORMAL

## 2024-05-16 ASSESSMENT — EXTERNAL EXAM - RIGHT EYE: OD_EXAM: NORMAL

## 2024-05-16 NOTE — PROGRESS NOTES
Subjective   Patient ID: Robbie Tovar is a 69 y.o. male.    Current Outpatient Medications (Ophthalmology pharm classes)   Medication Sig Dispense Refill    neomycin-polymyxin-HC (Cortisporin) 3.5-10,000-10 mg-unit-mg/mL ophthalmic suspension Instill 2 drops into the affected eye every 4 hours while awake 7.5 mL 0    tobramycin-dexamethasone (Tobradex) ophthalmic suspension Instill 1 drop in right eye four times a day for 3 days, then three times a day for 2 days, then twice daily for 1 day 5 mL 0     Current Outpatient Medications (Other)   Medication Sig Dispense Refill    alpha lipoic acid 50 mg capsule Take by mouth.      amoxicillin (Amoxil) 500 mg capsule Take 1 capsule by mouth two times a day for 10 days. 20 capsule 1    azithromycin (Zithromax) 250 mg tablet Take as directed on package 6 tablet 0    celecoxib (CeleBREX) 200 mg capsule Take 1 capsule (200 mg) by mouth 2 times a day as needed for mild pain (1 - 3) 60 capsule 2    ciclopirox (Loprox) 0.77 % cream APPLY 1 APPLICATION TO AFFECTED AREA TWICE DAILY. 90 g 3    fluticasone (Flonase) 50 mcg/actuation nasal spray Administer 2 sprays into each nostril once daily.      fluticasone (Flonase) 50 mcg/actuation nasal spray Administer 2 sprays into each nostril once daily 16 g 0    hydroCHLOROthiazide (HYDRODiuril) 12.5 mg tablet Take 1 tablet (12.5 mg) by mouth once daily. (Patient not taking: Reported on 3/12/2024) 90 tablet 1    hydrocortisone 2.5 % cream APPLY TO AFFECTED AREA(S) OF ANGLES OF MOUTH (MIXED WITH KETOCONAZOLE CREAM) TWO TIMES A DAY FOR 2 WEEKS. REPEAT AS NEEDED FLARE (Patient not taking: Reported on 10/9/2023) 30 g 2    methylPREDNISolone (Medrol Dospak) 4 mg tablets Take as directed on package 21 tablet 0    pantoprazole (ProtoNix) 40 mg EC tablet Take 1 tablet (40 mg) by mouth once every 24 hours. 90 tablet 3    vit B complex no.12/niacin,B3, (VITAMIN B COMPLEX NO.12-NIACIN ORAL) Take by mouth.      zolpidem (Ambien) 10 mg tablet TAKE 1  TABLET (10 MG) BY MOUTH AS NEEDED AT BEDTIME FOR SLEEP. 30 tablet 2       Objective   Base Eye Exam       Visual Acuity (Snellen - Linear)         Right Left    Dist cc 20/20 -2 20/20      Correction: Glasses              Tonometry (Tonopen, 3:15 PM)         Right Left    Pressure 18               Pupils         Shape React APD    Right Round Minimal None    Left Round Brisk               Extraocular Movement         Right Left     Full, Ortho Full, Ortho              Neuro/Psych       Oriented x3: Yes    Mood/Affect: Normal              Dilation       Right eye: 1.0% Mydriacyl @ 3:15 PM                  Slit Lamp and Fundus Exam       External Exam         Right Left    External Normal Normal              Slit Lamp Exam         Right Left    Lids/Lashes Normal fine micro hairs surrounding eyelid margins    Conjunctiva/Sclera White and quiet White and quiet    Cornea (-) infiltrate, abrasion, dendrite Clear    Anterior Chamber Deep and quiet Deep and quiet    Iris Round and reactive Round and reactive    Lens Clear Clear    Anterior Vitreous Normal undilated              Fundus Exam         Right Left    Disc Normal     Macula Normal     Vessels Normal     Periphery Normal                     Assessment/Plan       Initially saw outside optometrist for red painful eye, thought to be foreign body irritation secondary to hair, underwent removal at slit lamp.     Saw Dr. Calhoun 4/16 and 4/17, diagnosed as epidemic keratoconjunctivitis (EKC) right eye>left eye. Was started on PF q2h.     Saw Dr. Antoine 4/19, diagnosed as scleritis, started on PF q1h, moxi QID.    TODAY 05/19/24     +joint pain (arthritis)  + sinus infection 3-4 weeks ago  + no ulcers    #Scleritis right eye  -Diagnosed 4/18/24 [facility], started on PF q1h and moxi QID  -At visit 4/25, had subjective improvement, and has improved scleral injection today  -DFE notable for 360 choroidals, intraocular pressure (IOP) 21  -Obtained laboratory workup : CBC/CMP,  ESR/CRP, antinuclear antibodies test (RIGO), anti-dsDNA, RF, anti-neutrophil cytoplasmic antibodies (ANCA), syphilis, TB, ACE/Lysozyme - all normal  -Initiated PF taper at last visit, moxi was d/c'd  -Today, scleritis has significantly improved with just the topical PF.   -Patient reports that pain/discomfort has resolved, and prominent choroidals have resolved on DFE today.   -Given absence of underlying condition thus far, single episode that responded very well to topical medication, will monitor for now.   -Discussed with patient option of fluorescein angiography (FA), however given quiet/asymptomatic today can hold off for now.   -If recurs may need to consider referral to Uveitis.   -Patient going to [place], will be back in approx 2 weeks. Follow up 1 month, sooner PRN/

## 2024-05-17 ENCOUNTER — PHARMACY VISIT (OUTPATIENT)
Dept: PHARMACY | Facility: CLINIC | Age: 69
End: 2024-05-17
Payer: COMMERCIAL

## 2024-05-19 ASSESSMENT — SLIT LAMP EXAM - LIDS: COMMENTS: NORMAL

## 2024-05-30 ENCOUNTER — PHARMACY VISIT (OUTPATIENT)
Dept: PHARMACY | Facility: CLINIC | Age: 69
End: 2024-05-30
Payer: COMMERCIAL

## 2024-05-30 PROCEDURE — RXMED WILLOW AMBULATORY MEDICATION CHARGE

## 2024-05-30 RX ORDER — BENZONATATE 200 MG/1
200 CAPSULE ORAL 3 TIMES DAILY
Qty: 30 CAPSULE | Refills: 0 | OUTPATIENT
Start: 2024-05-30

## 2024-05-30 RX ORDER — LEVOFLOXACIN 500 MG/1
500 TABLET, FILM COATED ORAL DAILY
Qty: 10 TABLET | Refills: 0 | OUTPATIENT
Start: 2024-05-30

## 2024-05-30 RX ORDER — METHYLPREDNISOLONE 4 MG/1
TABLET ORAL
Qty: 21 TABLET | Refills: 0 | OUTPATIENT
Start: 2024-05-30

## 2024-06-07 ENCOUNTER — OFFICE VISIT (OUTPATIENT)
Dept: PRIMARY CARE | Facility: CLINIC | Age: 69
End: 2024-06-07
Payer: COMMERCIAL

## 2024-06-07 VITALS — BODY MASS INDEX: 29.42 KG/M2 | SYSTOLIC BLOOD PRESSURE: 130 MMHG | WEIGHT: 223 LBS | DIASTOLIC BLOOD PRESSURE: 90 MMHG

## 2024-06-07 DIAGNOSIS — Z00.00 HEALTHCARE MAINTENANCE: ICD-10-CM

## 2024-06-07 DIAGNOSIS — G47.00 INSOMNIA, UNSPECIFIED TYPE: ICD-10-CM

## 2024-06-07 DIAGNOSIS — M17.12 ARTHRITIS OF KNEE, LEFT: ICD-10-CM

## 2024-06-07 DIAGNOSIS — M46.1 SACROILIITIS (CMS-HCC): ICD-10-CM

## 2024-06-07 DIAGNOSIS — M17.11 ARTHRITIS OF KNEE, RIGHT: Primary | ICD-10-CM

## 2024-06-07 PROCEDURE — RXMED WILLOW AMBULATORY MEDICATION CHARGE

## 2024-06-07 PROCEDURE — 99213 OFFICE O/P EST LOW 20 MIN: CPT | Performed by: STUDENT IN AN ORGANIZED HEALTH CARE EDUCATION/TRAINING PROGRAM

## 2024-06-07 PROCEDURE — 3080F DIAST BP >= 90 MM HG: CPT | Performed by: STUDENT IN AN ORGANIZED HEALTH CARE EDUCATION/TRAINING PROGRAM

## 2024-06-07 PROCEDURE — 1036F TOBACCO NON-USER: CPT | Performed by: STUDENT IN AN ORGANIZED HEALTH CARE EDUCATION/TRAINING PROGRAM

## 2024-06-07 PROCEDURE — 3075F SYST BP GE 130 - 139MM HG: CPT | Performed by: STUDENT IN AN ORGANIZED HEALTH CARE EDUCATION/TRAINING PROGRAM

## 2024-06-07 RX ORDER — ZOLPIDEM TARTRATE 10 MG/1
10 TABLET ORAL NIGHTLY PRN
Qty: 90 TABLET | Refills: 1 | Status: SHIPPED | OUTPATIENT
Start: 2024-06-07 | End: 2024-12-04

## 2024-06-07 RX ORDER — CELECOXIB 200 MG/1
200 CAPSULE ORAL 2 TIMES DAILY PRN
Qty: 180 CAPSULE | Refills: 1 | Status: SHIPPED | OUTPATIENT
Start: 2024-06-07

## 2024-06-07 RX ORDER — FLUTICASONE PROPIONATE 50 MCG
2 SPRAY, SUSPENSION (ML) NASAL DAILY
Qty: 48 G | Refills: 1 | Status: SHIPPED | OUTPATIENT
Start: 2024-06-07

## 2024-06-07 NOTE — PROGRESS NOTES
Follow up and med refill    Subjective   Patient ID: Robbie Tovar is a 69 y.o. male who presents for Follow-up and Med Refill.    HPI     Presents for follow-up medication refill  Consider doing more physical therapy for his right knee  Has been having extensive abnormality evaluation for his right eye  Recently returned from trip from [place].  Had cough and upper respiratory congestion.  Had been started on levofloxacin and course of steroids.  Symptoms have improved by about 70%    Review of Systems    8 point review of systems is otherwise negative unless mentioned on HPI      Objective   /90   Wt 101 kg (223 lb)   BMI 29.42 kg/m²     Physical Exam    General: No acute distress  HEENT: EOMI  CV: Regular rate and rhythm, normal S1 and S2, no murmurs  Pulm: Clear to auscultation bilaterally, no wheezings, rales or rhonchi  Abd: Nondistended  MSK: 5/5 strength in all extremities  Skin: No rashes   Lymphatic: No lymphadenopathy      Assessment/Plan       #HTN/leg swelling  -Continue hydrochlorothiazide    #History of cerumen impaction, has undergone lavage in the past    #Continued right knee limitations after knee replacement  -Referral for physical therapy     #Insomnia: Continue Ambien. Patient says he has been stable for many years. He is resistant to stopping this medication or trying another medication. Controlled substance contract 5/2024 and UDS 5/2024. I personally reviewed [place].     #obesity: Encouraged continued efforts at LM, weight loss. Offered support.     #GERD: Takes PPI.     #IBS-D: uses bentyl PRN     #Pre-diabetes: weight loss, diet modifications,      #BCC: referral to dermatology for further management and interested in skin tag management.      #Health maintenance: He gets yearly flu shot. He declines TDaP. Pneumovax given 2022. Colonoscopy has been ordered previously. Advised Shingrix. Received COVID-19 vaccines and yearly flu shot.     RTC 6 months    This note was dictated by speech  recognition. Minor errors in transcription may be present.

## 2024-06-13 ENCOUNTER — PHARMACY VISIT (OUTPATIENT)
Dept: PHARMACY | Facility: CLINIC | Age: 69
End: 2024-06-13
Payer: COMMERCIAL

## 2024-06-13 PROCEDURE — RXMED WILLOW AMBULATORY MEDICATION CHARGE

## 2024-06-16 NOTE — PROGRESS NOTES
Subjective   Patient ID: Robbie Tovar is a 69 y.o. male.    Current Outpatient Medications (Ophthalmology pharm classes)   Medication Sig Dispense Refill    neomycin-polymyxin-HC (Cortisporin) 3.5-10,000-10 mg-unit-mg/mL ophthalmic suspension Instill 2 drops into the affected eye every 4 hours while awake 7.5 mL 0    tobramycin-dexamethasone (Tobradex) ophthalmic suspension Instill 1 drop in right eye four times a day for 3 days, then three times a day for 2 days, then twice daily for 1 day 5 mL 0     Current Outpatient Medications (Other)   Medication Sig Dispense Refill    alpha lipoic acid 50 mg capsule Take by mouth.      amoxicillin (Amoxil) 500 mg capsule Take 1 capsule by mouth two times a day for 10 days. 20 capsule 1    azithromycin (Zithromax) 250 mg tablet Take as directed on package 6 tablet 0    benzonatate (Tessalon) 200 mg capsule Take 1 capsule (200 mg) by mouth 3 times a day. 30 capsule 0    celecoxib (CeleBREX) 200 mg capsule Take 1 capsule (200 mg) by mouth 2 times a day as needed for mild pain (1 - 3) 180 capsule 1    ciclopirox (Loprox) 0.77 % cream APPLY 1 APPLICATION TO AFFECTED AREA TWICE DAILY. 90 g 3    fluticasone (Flonase) 50 mcg/actuation nasal spray Administer 2 sprays into each nostril once daily 16 g 0    fluticasone (Flonase) 50 mcg/actuation nasal spray Administer 2 sprays into each nostril once daily. 48 g 1    levoFLOXacin (Levaquin) 500 mg tablet Take 1 tablet (500 mg) by mouth once daily. 10 tablet 0    methylPREDNISolone (Medrol, Alistair,) 4 mg tablets Use as directed on package 21 tablet 0    pantoprazole (ProtoNix) 40 mg EC tablet Take 1 tablet (40 mg) by mouth once every 24 hours. 90 tablet 3    vit B complex no.12/niacin,B3, (VITAMIN B COMPLEX NO.12-NIACIN ORAL) Take by mouth.      zolpidem (Ambien) 10 mg tablet TAKE 1 TABLET (10 MG) BY MOUTH AS NEEDED AT BEDTIME FOR SLEEP. 90 tablet 1    hydroCHLOROthiazide (HYDRODiuril) 12.5 mg tablet Take 1 tablet (12.5 mg) by mouth once  daily. (Patient not taking: Reported on 3/12/2024) 90 tablet 1    hydrocortisone 2.5 % cream APPLY TO AFFECTED AREA(S) OF ANGLES OF MOUTH (MIXED WITH KETOCONAZOLE CREAM) TWO TIMES A DAY FOR 2 WEEKS. REPEAT AS NEEDED FLARE (Patient not taking: Reported on 10/9/2023) 30 g 2       Objective   Base Eye Exam       Visual Acuity (Snellen - Linear)         Right Left    Dist cc 20/20 -1 20/20 -2      Correction: Glasses              Tonometry (Goldmann Applanation, 10:58 AM)         Right Left    Pressure 16 18              Pupils         Pupils    Right PERRL, No APD    Left PERRL, No APD              Visual Fields         Left Right     Full Full              Extraocular Movement         Right Left     Full Full              Neuro/Psych       Oriented x3: Yes    Mood/Affect: Normal              Dilation       Both eyes: 1% Mydriacyl & 2.5% Timur  @ 10:58 AM                  Slit Lamp and Fundus Exam       External Exam         Right Left    External Normal Normal              Slit Lamp Exam         Right Left    Lids/Lashes Normal Normal    Conjunctiva/Sclera White and quiet White and quiet    Cornea (-) infiltrate, abrasion, dendrite Clear    Anterior Chamber Deep and quiet Deep and quiet    Iris Round and reactive Round and reactive    Lens Clear Clear    Anterior Vitreous Normal undilated              Fundus Exam         Right Left    Disc Normal Normal    C/D Ratio 0.3 0.3    Macula Normal Normal    Vessels Normal Normal    Periphery Normal Normal                  Imaging    Macula OCT 06/17/24  Right eye (OD): Normal contour and appearance, no IRF/subretinal fluid (SRF)  Left eye (OS): Normal contour and appearance, no IRF/subretinal fluid (SRF)      Assessment/Plan     Last saw me 5/16/24    +joint pain (arthritis)  + sinus infection  + no ulcers    #Scleritis right eye  -Diagnosed 4/18/24 [facility], started on PF q1h and moxi QID  -At visit 4/25, had subjective improvement, and has improved scleral injection today  -DFE  notable for 360 choroidals, intraocular pressure (IOP) 21  -Obtained laboratory workup : CBC/CMP, ESR/CRP, antinuclear antibodies test (RIGO), anti-dsDNA, RF, anti-neutrophil cytoplasmic antibodies (ANCA), syphilis, TB, ACE/Lysozyme - all normal  -Responded well to topical PF, has since tapered off without recurrence  -Given absence of underlying condition thus far, single episode that responded very well to topical medication, will monitor for now.   -Discussed with patient option of fluorescein angiography (FA), however given quiet/asymptomatic today can hold off for now.   -Today, reports syptoms stable, no episodes of redness/pain, exam stable  -If recurs may need to consider referral to Uveitis, repeat course of topical PF  -Interested in new Mrx, has seen Dr. Calhoun  -Will set up appt Dr. Calhoun, next available for new Mrx  -4 months me sooner PRN

## 2024-06-17 ENCOUNTER — APPOINTMENT (OUTPATIENT)
Dept: OPHTHALMOLOGY | Facility: CLINIC | Age: 69
End: 2024-06-17
Payer: COMMERCIAL

## 2024-06-17 DIAGNOSIS — H15.001 SCLERITIS, RIGHT: Primary | ICD-10-CM

## 2024-06-17 PROCEDURE — 92134 CPTRZ OPH DX IMG PST SGM RTA: CPT | Performed by: OPHTHALMOLOGY

## 2024-06-17 PROCEDURE — 99213 OFFICE O/P EST LOW 20 MIN: CPT | Performed by: OPHTHALMOLOGY

## 2024-06-17 ASSESSMENT — CUP TO DISC RATIO
OS_RATIO: 0.3
OD_RATIO: 0.3

## 2024-06-17 ASSESSMENT — CONF VISUAL FIELD
OD_INFERIOR_TEMPORAL_RESTRICTION: 0
OS_SUPERIOR_NASAL_RESTRICTION: 0
OD_INFERIOR_NASAL_RESTRICTION: 0
OS_INFERIOR_TEMPORAL_RESTRICTION: 0
OS_NORMAL: 1
OS_SUPERIOR_TEMPORAL_RESTRICTION: 0
OD_SUPERIOR_NASAL_RESTRICTION: 0
OS_INFERIOR_NASAL_RESTRICTION: 0
OD_SUPERIOR_TEMPORAL_RESTRICTION: 0
OD_NORMAL: 1

## 2024-06-17 ASSESSMENT — ENCOUNTER SYMPTOMS
ALLERGIC/IMMUNOLOGIC NEGATIVE: 0
GASTROINTESTINAL NEGATIVE: 0
HEMATOLOGIC/LYMPHATIC NEGATIVE: 0
NEUROLOGICAL NEGATIVE: 0
MUSCULOSKELETAL NEGATIVE: 0
CONSTITUTIONAL NEGATIVE: 0
RESPIRATORY NEGATIVE: 0
ENDOCRINE NEGATIVE: 0
PSYCHIATRIC NEGATIVE: 0
CARDIOVASCULAR NEGATIVE: 0
EYES NEGATIVE: 1

## 2024-06-17 ASSESSMENT — TONOMETRY
IOP_METHOD: GOLDMANN APPLANATION
OS_IOP_MMHG: 18
OD_IOP_MMHG: 16

## 2024-06-17 ASSESSMENT — SLIT LAMP EXAM - LIDS
COMMENTS: NORMAL
COMMENTS: NORMAL

## 2024-06-17 ASSESSMENT — VISUAL ACUITY
OS_CC+: -2
OD_CC: 20/20
OS_CC: 20/20
CORRECTION_TYPE: GLASSES
METHOD: SNELLEN - LINEAR
OD_CC+: -1

## 2024-06-17 ASSESSMENT — EXTERNAL EXAM - RIGHT EYE: OD_EXAM: NORMAL

## 2024-06-17 ASSESSMENT — EXTERNAL EXAM - LEFT EYE: OS_EXAM: NORMAL

## 2024-06-19 PROCEDURE — RXMED WILLOW AMBULATORY MEDICATION CHARGE

## 2024-06-20 ENCOUNTER — PHARMACY VISIT (OUTPATIENT)
Dept: PHARMACY | Facility: CLINIC | Age: 69
End: 2024-06-20
Payer: COMMERCIAL

## 2024-06-28 DIAGNOSIS — M43.00 SPONDYLOLYSIS: ICD-10-CM

## 2024-06-28 DIAGNOSIS — M47.816 LUMBAR SPONDYLOSIS: Primary | ICD-10-CM

## 2024-06-28 DIAGNOSIS — G89.29 CHRONIC MIDLINE LOW BACK PAIN WITH RIGHT-SIDED SCIATICA: ICD-10-CM

## 2024-06-28 DIAGNOSIS — M54.41 CHRONIC MIDLINE LOW BACK PAIN WITH RIGHT-SIDED SCIATICA: ICD-10-CM

## 2024-07-09 ENCOUNTER — OFFICE VISIT (OUTPATIENT)
Dept: PAIN MEDICINE | Facility: CLINIC | Age: 69
End: 2024-07-09
Payer: COMMERCIAL

## 2024-07-09 ENCOUNTER — PREP FOR PROCEDURE (OUTPATIENT)
Dept: PAIN MEDICINE | Facility: CLINIC | Age: 69
End: 2024-07-09
Payer: COMMERCIAL

## 2024-07-09 VITALS
HEART RATE: 65 BPM | OXYGEN SATURATION: 97 % | RESPIRATION RATE: 18 BRPM | DIASTOLIC BLOOD PRESSURE: 85 MMHG | HEIGHT: 73 IN | SYSTOLIC BLOOD PRESSURE: 137 MMHG | BODY MASS INDEX: 29.55 KG/M2 | TEMPERATURE: 97.7 F | WEIGHT: 223 LBS

## 2024-07-09 DIAGNOSIS — G89.29 CHRONIC LEFT-SIDED LOW BACK PAIN WITH LEFT-SIDED SCIATICA: ICD-10-CM

## 2024-07-09 DIAGNOSIS — M51.26 LUMBAR DISC HERNIATION: Primary | ICD-10-CM

## 2024-07-09 DIAGNOSIS — M54.42 CHRONIC LEFT-SIDED LOW BACK PAIN WITH LEFT-SIDED SCIATICA: ICD-10-CM

## 2024-07-09 DIAGNOSIS — M54.16 LUMBAR NEURITIS: ICD-10-CM

## 2024-07-09 DIAGNOSIS — M54.16 LUMBAR NEURITIS: Primary | ICD-10-CM

## 2024-07-09 PROCEDURE — 99214 OFFICE O/P EST MOD 30 MIN: CPT | Performed by: ANESTHESIOLOGY

## 2024-07-09 PROCEDURE — RXMED WILLOW AMBULATORY MEDICATION CHARGE

## 2024-07-09 RX ORDER — GABAPENTIN 300 MG/1
300 CAPSULE ORAL NIGHTLY
Qty: 30 CAPSULE | Refills: 3 | Status: SHIPPED | OUTPATIENT
Start: 2024-07-09 | End: 2024-08-09

## 2024-07-09 ASSESSMENT — PAIN SCALES - GENERAL
PAINLEVEL_OUTOF10: 6
PAINLEVEL: 6

## 2024-07-09 ASSESSMENT — PAIN - FUNCTIONAL ASSESSMENT: PAIN_FUNCTIONAL_ASSESSMENT: 0-10

## 2024-07-09 ASSESSMENT — ENCOUNTER SYMPTOMS
DEPRESSION: 0
LOSS OF SENSATION IN FEET: 0
OCCASIONAL FEELINGS OF UNSTEADINESS: 0

## 2024-07-09 ASSESSMENT — PAIN DESCRIPTION - DESCRIPTORS: DESCRIPTORS: BURNING;ACHING

## 2024-07-09 NOTE — PROGRESS NOTES
History Of Present Illness  Robbie Tovar is a 69 y.o. male presenting with   Chief Complaint   Patient presents with    Back Pain     Pain 6/10 left low back radiating into left buttock to calf some in foot.       Patient RETURNS for chronic left sided low back pain to the [facility]. The pain is constant, worse with bending activity and better with rest/sitting. The pain is an aching and throbbing like sensation towards the LEFT SIDE . Denies LE paresthesias,  saddle anesthesia, bowel or bladder incontinence. To manage this pain the patient has attempted CELEBREX with 30-60% relief at times.  The patients chronic GERD, allergies, insomnia are stable on medication management.     PAIN SCORE: 7-8/10.       PP: Dr. Shirley, Dr. Lee     Past Medical History  He has a past medical history of Chronic sinusitis, unspecified (09/21/2016), Plantar fascial fibromatosis (09/21/2016), and Primary insomnia (09/21/2016).    Surgical History  He has a past surgical history that includes Other surgical history (05/18/2021); Other surgical history (05/18/2021); and Other surgical history (05/12/2020).     Social History  He reports that he has never smoked. He has never used smokeless tobacco. He reports that he does not drink alcohol and does not use drugs.    Family History  Family History   Problem Relation Name Age of Onset    Allergies Mother      Other (cardiac disorder) Mother          stent 70s    Hypertension Mother      Hypertension Father      Allergies Sister      Glaucoma Neg Hx      Macular degeneration Neg Hx          Allergies  Iodinated contrast media    Review of Systems    All other systems reviewed and negative for any deficits. Pertinent positives and negatives were considered in the medical decision making process.        Physical Exam  /85   Pulse 65   Temp 36.5 °C (97.7 °F) (Tympanic)   Resp 18   Wt 101 kg (223 lb)   SpO2 97%     RECALL FROM PREVIOUS VISIT:     General: Pt appears stated age    Eyes:  Conjunctiva non-icteric and lids without obvious rash or drooping. Pupils are symmetric.     Conjunctiva injected    ENT: External ears and nose appear to be without deformity or rash. No lesions or masses noted. Hearing is grossly intact    Neck: No JVD noted, tracheal position midline. No goiter noted on assessment of thyroid    Respiratory: No gasping or shortness of breath noted, no use of accessory muscles noted    Cardiovascular: Extremities show no edema or varicosities    Skin: No rashes or open lesions/ulcers identified on skin. No induration/tightening noted with palpation of skin    Musculoskeletal: Gait is grossly normal    Digits/nails show no clubbing or cyanosis    Exam of muscles/joints/bones shows no gross atrophy and no abnormal/involuntary movements in the head/neckNo asymmetry or masses noted in the head/neck    Stability: no subluxation noted on movement of bilateral upper extremities or head/neck    Strength: 5/5 in RLE and 4/5 LLE     Range of Motion: WNL     Sensation: DEC HIP FLEXION IN RLE     Cranial nerves 2-12 are grossly intact    Psychiatric: Pt is alert and oriented to time, place and person.         Assessment/Plan   1. Lumbar disc herniation        2. Lumbar neuritis        3. Chronic left-sided low back pain with left-sided sciatica             1. I have previously provided the patient with a list of physical therapy exercises to learn and perform to strengthen core, maintain stabilization, and reduce pain. We reviewed the exercises in detail and I encouraged them to perform them on a regular basis.    2. I would recommend the pt continue Celebrex to help with nerve related pain. We discussed the risks, benefits, and side effects to this medication including the mechanism of action and the pt understands.     3. I also reviewed the patients MRI findings in detail, including review of the actual images and provided a detailed explanation of the findings using a spine model.      There is noted facet degenerative changes at the L4/5 and L5/S1 level with mild canal stenosis.     There is a disc herniation at the L4/5 and more prominently at the L5/S1 level.     4.  The patient is a candidate for an LEFT LTR at L4 and L5 versus  LESI L5/S1 to treat low back and radicular pain. I spent time with the patient discussing all of the risks, benefits, and alternatives to this measure. Including but not limited to worsening pain with injection, no improvement with pain, numbness in the lower extremity, vagal reaction, hypotension, feeling lightheaded/dizzy, spinal infection, epidural hematoma/abscess, paralysis, nerve injury, steroid effects, and spinal headache.The patient understands ALL of these risks and agrees to proceed with the planned procedure.    Will schedule with Valium 10mg PO.     His lumbar injection was on 4- and 6-6-2024 he reported ~50% relief of his pain that lasted for approx ~1 months time and has gradually returned.     5. I would recommend the pt start on Gabapentin 300mg at bedtime daily to help with nerve related pain. We discussed the risks, benefits, and side effects to this medication including the mechanism of action and the pt understands and agrees.      Ar Jean MD    I spent time with the patient reviewing their imaging and discussing the risks benefits and alternatives to the above plan. A total of 30 minutes was spent reviewing the data and greater than 50% of that time was with the patient during the face to face encounter discussing treatment options both surgical, non-surgical, and minimally invasive techniques.       DATE OF PROCEDURE: 7/9/2024    PRE-PROCEDURE DIAGNOSIS: LEFT knee osteoarthritis    POST-PROCEDURE DIAGNOSIS: Same as above    PROCEDURE:    LEFT knee joint steroid injection    ANESTHESIA: Local    COMPLICATIONS: None    ATTENDING PHYSICIAN: Ar Jean M.D.    CLINICAL DATA: The patient is a 69 y.o. year old male presenting with  a history of pain to the LEFT KNEE. The patient also has X-ray evidence of bilateral knee osteoarthritis.    PROCEDURE: The patient was identified in the preop area. After risks and benefits were discussed, informed consent was obtained. The patient was brought back to the procedure room and placed in the supine position. The patient's LEFT knee were prepped and draped in usual sterile fashion. The skin overlying the trajectory to the knee joint was anesthetized with a total of 5ml of 0.5% lidocaine via a one inch long 25 G needle. Next the joint was accessed with a 22G spinal needle using the superior lateral approach. After negative aspiration for heme a total of 4ml of 0.5% Ropivacaine and 40mg of kenalog was injected. The needle was removed and a sterile dressing was applied. The patient tolerated the procedure well and was discharged home in good condition.    PLAN: The pt will follow up in the office in one to two months to report their results with the procedure. Discharge instructions were reviewed in recovery and provided to the patient in writing. They were advised to call should they have any questions or concerns.

## 2024-07-10 ENCOUNTER — PHARMACY VISIT (OUTPATIENT)
Dept: PHARMACY | Facility: CLINIC | Age: 69
End: 2024-07-10
Payer: COMMERCIAL

## 2024-07-17 ENCOUNTER — APPOINTMENT (OUTPATIENT)
Dept: OPHTHALMOLOGY | Facility: CLINIC | Age: 69
End: 2024-07-17
Payer: COMMERCIAL

## 2024-07-17 DIAGNOSIS — H52.4 HYPEROPIA WITH PRESBYOPIA OF BOTH EYES: Primary | ICD-10-CM

## 2024-07-17 DIAGNOSIS — H52.03 HYPEROPIA WITH PRESBYOPIA OF BOTH EYES: Primary | ICD-10-CM

## 2024-07-17 PROCEDURE — 92012 INTRM OPH EXAM EST PATIENT: CPT | Performed by: OPTOMETRIST

## 2024-07-17 PROCEDURE — 92015 DETERMINE REFRACTIVE STATE: CPT | Performed by: OPTOMETRIST

## 2024-07-17 ASSESSMENT — CUP TO DISC RATIO
OS_RATIO: 0.3
OD_RATIO: 0.3

## 2024-07-17 ASSESSMENT — ENCOUNTER SYMPTOMS
HEMATOLOGIC/LYMPHATIC NEGATIVE: 0
PSYCHIATRIC NEGATIVE: 0
EYES NEGATIVE: 1
NEUROLOGICAL NEGATIVE: 0
ALLERGIC/IMMUNOLOGIC NEGATIVE: 0
CARDIOVASCULAR NEGATIVE: 0
MUSCULOSKELETAL NEGATIVE: 0
CONSTITUTIONAL NEGATIVE: 0
GASTROINTESTINAL NEGATIVE: 0
RESPIRATORY NEGATIVE: 0
ENDOCRINE NEGATIVE: 0

## 2024-07-17 ASSESSMENT — TONOMETRY
IOP_METHOD: GOLDMANN APPLANATION
OD_IOP_MMHG: 16
OS_IOP_MMHG: 16

## 2024-07-17 ASSESSMENT — VISUAL ACUITY
OS_CC: 20/20-2
METHOD: SNELLEN - LINEAR
CORRECTION_TYPE: GLASSES
OD_CC: 20/20-2
OS_CC: J1+
OD_CC: J1+

## 2024-07-17 ASSESSMENT — REFRACTION_WEARINGRX
OS_ADD: +2.50
OD_SPHERE: +2.50
OD_ADD: +2.50
OS_SPHERE: +2.50

## 2024-07-17 ASSESSMENT — REFRACTION_MANIFEST
OS_ADD: +2.50
OD_SPHERE: +2.50
OS_SPHERE: +3.00
OD_ADD: +2.50

## 2024-07-17 ASSESSMENT — EXTERNAL EXAM - LEFT EYE: OS_EXAM: NORMAL

## 2024-07-17 ASSESSMENT — SLIT LAMP EXAM - LIDS
COMMENTS: NORMAL
COMMENTS: NORMAL

## 2024-07-17 ASSESSMENT — EXTERNAL EXAM - RIGHT EYE: OD_EXAM: NORMAL

## 2024-07-17 NOTE — PROGRESS NOTES
Epidemic keratoconjunctivitis (EKC). OD>OS.   Patient also has many small hair shavings (got hair cut with shaver last week and the eyelids and eyelid margins are covered with small hairs that are currently embedded in the skin) Patient advised to clean the skin carefully around the eyes. Previous DrGino Removed hair shavings from the eye as well.    Will defer Povidone Iodine treatment as onset of epidemic keratoconjunctivitis (EKC) was 7 days ago. Patient has pred acetate at home. Start using Q 2 hours. Has Dcd tobramycine gtt and does not want to take these anymore.  Use visine QID as well OD.     Back after 1 day. Still redness OD>OS. Clean eyelids with babyshampoo again including up to eye brows.  Use pred acetate Q 3hours today and then QID x 1 week. Then DC. Asked patient to keep me informed of progress. (Checked again today and no embedded hairs in cul de sac or in conjunctiva anywhere).    Had a systemic workup for scleritis and all blood work up and OCTs were normal. Seeing Dr Wiseman for this. Cornea and conjunctiva all better. (Still some hair clippings on eyelid margin from getting haircut 2 days ago).    A spectacle prescription was dispensed to be used as needed.  Minor change compared to old Rx. The patient was asked to return to our clinic in one year or sooner if ocular or vision changes occur.

## 2024-07-31 ENCOUNTER — PHARMACY VISIT (OUTPATIENT)
Dept: PHARMACY | Facility: CLINIC | Age: 69
End: 2024-07-31
Payer: COMMERCIAL

## 2024-07-31 PROCEDURE — RXMED WILLOW AMBULATORY MEDICATION CHARGE

## 2024-08-12 ENCOUNTER — OFFICE VISIT (OUTPATIENT)
Dept: PAIN MEDICINE | Facility: CLINIC | Age: 69
End: 2024-08-12
Payer: COMMERCIAL

## 2024-08-12 VITALS
BODY MASS INDEX: 29.42 KG/M2 | RESPIRATION RATE: 15 BRPM | HEART RATE: 68 BPM | WEIGHT: 223 LBS | SYSTOLIC BLOOD PRESSURE: 134 MMHG | DIASTOLIC BLOOD PRESSURE: 86 MMHG | OXYGEN SATURATION: 97 %

## 2024-08-12 DIAGNOSIS — M47.816 LUMBAR SPONDYLOSIS: Primary | ICD-10-CM

## 2024-08-12 DIAGNOSIS — M54.42 CHRONIC LEFT-SIDED LOW BACK PAIN WITH LEFT-SIDED SCIATICA: ICD-10-CM

## 2024-08-12 DIAGNOSIS — G89.29 CHRONIC LEFT-SIDED LOW BACK PAIN WITH LEFT-SIDED SCIATICA: ICD-10-CM

## 2024-08-12 DIAGNOSIS — M51.26 LUMBAR DISC HERNIATION: ICD-10-CM

## 2024-08-12 DIAGNOSIS — M54.16 LUMBAR NEURITIS: ICD-10-CM

## 2024-08-12 PROCEDURE — 99214 OFFICE O/P EST MOD 30 MIN: CPT | Performed by: ANESTHESIOLOGY

## 2024-08-12 ASSESSMENT — PAIN SCALES - GENERAL
PAINLEVEL_OUTOF10: 9
PAINLEVEL: 9

## 2024-08-12 ASSESSMENT — ENCOUNTER SYMPTOMS
OCCASIONAL FEELINGS OF UNSTEADINESS: 0
LOSS OF SENSATION IN FEET: 0

## 2024-08-12 ASSESSMENT — PAIN - FUNCTIONAL ASSESSMENT: PAIN_FUNCTIONAL_ASSESSMENT: 0-10

## 2024-08-12 ASSESSMENT — PAIN DESCRIPTION - DESCRIPTORS: DESCRIPTORS: SHARP

## 2024-08-12 NOTE — PROGRESS NOTES
History Of Present Illness  Robbie Tovar is a 69 y.o. male presenting with   Chief Complaint   Patient presents with    Follow-up       Patient RETURNS for chronic left sided low back pain to the E. The pain is constant, worse with bending activity and better with rest/sitting. The pain is an aching and throbbing like sensation towards the LEFT SIDE . Denies LE paresthesias,  saddle anesthesia, bowel or bladder incontinence. To manage this pain the patient has attempted CELEBREX with 30-60% relief at times.  The patients chronic GERD, allergies, insomnia are stable on medication management.     PAIN SCORE: 7-8/10.       PP: Dr. Shirley, Dr. Lee     Past Medical History  He has a past medical history of Chronic sinusitis, unspecified (09/21/2016), Plantar fascial fibromatosis (09/21/2016), and Primary insomnia (09/21/2016).    Surgical History  He has a past surgical history that includes Other surgical history (05/18/2021); Other surgical history (05/18/2021); and Other surgical history (05/12/2020).     Social History  He reports that he has never smoked. He has never used smokeless tobacco. He reports that he does not drink alcohol and does not use drugs.    Family History  Family History   Problem Relation Name Age of Onset    Allergies Mother      Other (cardiac disorder) Mother          stent 70s    Hypertension Mother      Hypertension Father      Allergies Sister      Glaucoma Neg Hx      Macular degeneration Neg Hx          Allergies  Iodinated contrast media    Review of Systems    All other systems reviewed and negative for any deficits. Pertinent positives and negatives were considered in the medical decision making process.        Physical Exam  /86   Pulse 68   Resp 15   Wt 101 kg (223 lb)   SpO2 97%     RECALL FROM PREVIOUS VISIT:     General: Pt appears stated age    Eyes: Conjunctiva non-icteric and lids without obvious rash or drooping. Pupils are symmetric.     Conjunctiva  injected    ENT: External ears and nose appear to be without deformity or rash. No lesions or masses noted. Hearing is grossly intact    Neck: No JVD noted, tracheal position midline. No goiter noted on assessment of thyroid    Respiratory: No gasping or shortness of breath noted, no use of accessory muscles noted    Cardiovascular: Extremities show no edema or varicosities    Skin: No rashes or open lesions/ulcers identified on skin. No induration/tightening noted with palpation of skin    Musculoskeletal: Gait is grossly normal    Digits/nails show no clubbing or cyanosis    Exam of muscles/joints/bones shows no gross atrophy and no abnormal/involuntary movements in the head/neckNo asymmetry or masses noted in the head/neck    Stability: no subluxation noted on movement of bilateral upper extremities or head/neck    Strength: 5/5 in RLE and 4/5 LLE     Range of Motion: WNL     Sensation: DEC HIP FLEXION IN RLE     Cranial nerves 2-12 are grossly intact    Psychiatric: Pt is alert and oriented to time, place and person.         Assessment/Plan   No diagnosis found.       1. I have previously provided the patient with a list of physical therapy exercises to learn and perform to strengthen core, maintain stabilization, and reduce pain. We reviewed the exercises in detail and I encouraged them to perform them on a regular basis.    2. I would recommend the pt continue Celebrex to help with nerve related pain. We discussed the risks, benefits, and side effects to this medication including the mechanism of action and the pt understands.     3. I also reviewed the patients MRI findings in detail, including review of the actual images and provided a detailed explanation of the findings using a spine model.     There is noted facet degenerative changes at the L4/5 and L5/S1 level with mild canal stenosis.     There is a disc herniation at the L4/5 and more prominently at the L5/S1 level.     4.  The patient is a candidate  for an LESI at the L5/S1 vs  LEFT LTR at L4 and L5 versus  LESI L5/S1 to treat low back and radicular pain. I spent time with the patient discussing all of the risks, benefits, and alternatives to this measure. Including but not limited to worsening pain with injection, no improvement with pain, numbness in the lower extremity, vagal reaction, hypotension, feeling lightheaded/dizzy, spinal infection, epidural hematoma/abscess, paralysis, nerve injury, steroid effects, and spinal headache.The patient understands ALL of these risks and agrees to proceed with the planned procedure.    Will schedule with Valium 10mg PO.     His lumbar injection was on 4- and 6-6-2024 he reported ~50% relief of his pain that lasted for approx ~1 months time and has gradually returned.     5. I would recommend the pt start on Gabapentin 300mg at bedtime daily to help with nerve related pain. We discussed the risks, benefits, and side effects to this medication including the mechanism of action and the pt understands and agrees.      Ar Jean MD    I spent time with the patient reviewing their imaging and discussing the risks benefits and alternatives to the above plan. A total of 30 minutes was spent reviewing the data and greater than 50% of that time was with the patient during the face to face encounter discussing treatment options both surgical, non-surgical, and minimally invasive techniques.       DATE OF PROCEDURE: 8-    PRE-PROCEDURE DIAGNOSIS: LEFT knee osteoarthritis    POST-PROCEDURE DIAGNOSIS: Same as above    PROCEDURE:    LEFT knee joint steroid injection    ANESTHESIA: Local    COMPLICATIONS: None    ATTENDING PHYSICIAN: Ar Jean M.D.    CLINICAL DATA: The patient is a 69 y.o. year old male presenting with a history of pain to the LEFT KNEE. The patient also has X-ray evidence of bilateral knee osteoarthritis. Her last injection was 5-.     PROCEDURE: The patient was identified in the preop  area. After risks and benefits were discussed, informed consent was obtained. The patient was brought back to the procedure room and placed in the supine position. The patient's LEFT knee were prepped and draped in usual sterile fashion. The skin overlying the trajectory to the knee joint was anesthetized with a total of 5ml of 0.5% lidocaine via a one inch long 25 G needle. Next the joint was accessed with a 22G spinal needle using the superior lateral approach. After negative aspiration for heme a total of 4ml of 0.5% Ropivacaine and 40mg of kenalog was injected. The needle was removed and a sterile dressing was applied. The patient tolerated the procedure well and was discharged home in good condition.    PLAN: The pt will follow up in the office in one to two months to report their results with the procedure. Discharge instructions were reviewed in recovery and provided to the patient in writing. They were advised to call should they have any questions or concerns.

## 2024-08-16 ENCOUNTER — APPOINTMENT (OUTPATIENT)
Dept: PAIN MEDICINE | Facility: CLINIC | Age: 69
End: 2024-08-16
Payer: COMMERCIAL

## 2024-09-03 PROCEDURE — RXMED WILLOW AMBULATORY MEDICATION CHARGE

## 2024-09-05 ENCOUNTER — PHARMACY VISIT (OUTPATIENT)
Dept: PHARMACY | Facility: CLINIC | Age: 69
End: 2024-09-05
Payer: COMMERCIAL

## 2024-09-06 ENCOUNTER — APPOINTMENT (OUTPATIENT)
Dept: PAIN MEDICINE | Facility: CLINIC | Age: 69
End: 2024-09-06
Payer: COMMERCIAL

## 2024-10-04 ENCOUNTER — HOSPITAL ENCOUNTER (OUTPATIENT)
Dept: PAIN MEDICINE | Facility: CLINIC | Age: 69
Discharge: HOME | End: 2024-10-04
Payer: COMMERCIAL

## 2024-10-04 VITALS
SYSTOLIC BLOOD PRESSURE: 137 MMHG | DIASTOLIC BLOOD PRESSURE: 71 MMHG | BODY MASS INDEX: 29.16 KG/M2 | OXYGEN SATURATION: 97 % | WEIGHT: 220 LBS | HEART RATE: 58 BPM | TEMPERATURE: 97.9 F | RESPIRATION RATE: 15 BRPM | HEIGHT: 73 IN

## 2024-10-04 DIAGNOSIS — M54.16 LUMBAR NEURITIS: ICD-10-CM

## 2024-10-04 PROCEDURE — RXMED WILLOW AMBULATORY MEDICATION CHARGE

## 2024-10-04 PROCEDURE — 2500000002 HC RX 250 W HCPCS SELF ADMINISTERED DRUGS (ALT 637 FOR MEDICARE OP, ALT 636 FOR OP/ED): Performed by: ANESTHESIOLOGY

## 2024-10-04 PROCEDURE — 62323 NJX INTERLAMINAR LMBR/SAC: CPT | Performed by: ANESTHESIOLOGY

## 2024-10-04 PROCEDURE — 2500000004 HC RX 250 GENERAL PHARMACY W/ HCPCS (ALT 636 FOR OP/ED): Performed by: ANESTHESIOLOGY

## 2024-10-04 PROCEDURE — 2500000005 HC RX 250 GENERAL PHARMACY W/O HCPCS: Performed by: ANESTHESIOLOGY

## 2024-10-04 RX ORDER — SODIUM CHLORIDE 9 MG/ML
INJECTION, SOLUTION INTRAMUSCULAR; INTRAVENOUS; SUBCUTANEOUS AS NEEDED
Status: COMPLETED | OUTPATIENT
Start: 2024-10-04 | End: 2024-10-04

## 2024-10-04 RX ORDER — LIDOCAINE HYDROCHLORIDE 5 MG/ML
INJECTION, SOLUTION INFILTRATION; INTRAVENOUS AS NEEDED
Status: COMPLETED | OUTPATIENT
Start: 2024-10-04 | End: 2024-10-04

## 2024-10-04 RX ORDER — DIAZEPAM 5 MG/1
5 TABLET ORAL ONCE
Status: COMPLETED | OUTPATIENT
Start: 2024-10-04 | End: 2024-10-04

## 2024-10-04 RX ORDER — TRIAMCINOLONE ACETONIDE 40 MG/ML
INJECTION, SUSPENSION INTRA-ARTICULAR; INTRAMUSCULAR AS NEEDED
Status: COMPLETED | OUTPATIENT
Start: 2024-10-04 | End: 2024-10-04

## 2024-10-04 ASSESSMENT — ENCOUNTER SYMPTOMS
EYE DISCHARGE: 0
OCCASIONAL FEELINGS OF UNSTEADINESS: 0
DIAPHORESIS: 0
CHEST TIGHTNESS: 0
SHORTNESS OF BREATH: 0
WHEEZING: 0
DIZZINESS: 0
CONSTIPATION: 0
NECK PAIN: 0
FEVER: 0
DIARRHEA: 0
VOMITING: 0
NECK STIFFNESS: 0
LOSS OF SENSATION IN FEET: 0
BLOOD IN STOOL: 0
BACK PAIN: 1
SPEECH DIFFICULTY: 0
NUMBNESS: 1
DEPRESSION: 0
NAUSEA: 0
SEIZURES: 0
COUGH: 0
WEAKNESS: 0
CHILLS: 0
DIFFICULTY URINATING: 0
ARTHRALGIAS: 1

## 2024-10-04 ASSESSMENT — PAIN SCALES - GENERAL
PAINLEVEL_OUTOF10: 3
PAINLEVEL_OUTOF10: 6

## 2024-10-04 ASSESSMENT — PAIN DESCRIPTION - DESCRIPTORS: DESCRIPTORS: BURNING

## 2024-10-04 ASSESSMENT — PAIN - FUNCTIONAL ASSESSMENT: PAIN_FUNCTIONAL_ASSESSMENT: 0-10

## 2024-10-04 NOTE — H&P
History Of Present Illness  Robbie Tovar is a 69 y.o. male presenting with lumbar neuritis.     Past Medical History  Past Medical History:   Diagnosis Date    Chronic sinusitis, unspecified 09/21/2016    Clinical sinusitis    Plantar fascial fibromatosis 09/21/2016    Plantar fasciitis    Primary insomnia 09/21/2016    Primary insomnia       Surgical History  Past Surgical History:   Procedure Laterality Date    OTHER SURGICAL HISTORY  05/18/2021    Nasal septoplasty    OTHER SURGICAL HISTORY  05/18/2021    Tonsillectomy with adenoidectomy    OTHER SURGICAL HISTORY  05/12/2020    Hernia repair        Social History  He reports that he has never smoked. He has never used smokeless tobacco. He reports that he does not drink alcohol and does not use drugs.    Family History  Family History   Problem Relation Name Age of Onset    Allergies Mother      Other (cardiac disorder) Mother          stent 70s    Hypertension Mother      Hypertension Father      Allergies Sister      Glaucoma Neg Hx      Macular degeneration Neg Hx          Allergies  Iodinated contrast media    Review of Systems   Constitutional:  Negative for chills, diaphoresis and fever.   HENT:  Negative for ear discharge and tinnitus.    Eyes:  Negative for discharge.   Respiratory:  Negative for cough, chest tightness, shortness of breath and wheezing.    Cardiovascular:  Negative for chest pain.   Gastrointestinal:  Negative for blood in stool, constipation, diarrhea, nausea and vomiting.   Endocrine: Negative for polyuria.   Genitourinary:  Negative for difficulty urinating.   Musculoskeletal:  Positive for arthralgias, back pain and gait problem. Negative for neck pain and neck stiffness.   Skin:  Negative for rash.   Neurological:  Positive for numbness. Negative for dizziness, seizures, speech difficulty and weakness.        Physical Exam  Constitutional:       Appearance: Normal appearance.   HENT:      Head: Normocephalic.      Nose: Nose normal.  "     Mouth/Throat:      Mouth: Mucous membranes are moist.      Pharynx: Oropharynx is clear.   Eyes:      Extraocular Movements: Extraocular movements intact.      Conjunctiva/sclera: Conjunctivae normal.      Pupils: Pupils are equal, round, and reactive to light.   Cardiovascular:      Rate and Rhythm: Normal rate.   Pulmonary:      Effort: Pulmonary effort is normal. No respiratory distress.      Breath sounds: No wheezing.   Chest:      Chest wall: No tenderness.   Abdominal:      Palpations: Abdomen is soft.   Musculoskeletal:      Cervical back: No rigidity.   Skin:     General: Skin is warm and dry.      Findings: No rash.   Neurological:      Mental Status: He is alert and oriented to person, place, and time.      Sensory: Sensory deficit present.      Motor: Weakness present.      Gait: Gait abnormal.   Psychiatric:         Mood and Affect: Mood normal.         Behavior: Behavior normal.          Last Recorded Vitals  Blood pressure 137/71, pulse 62, temperature 36.6 °C (97.9 °F), temperature source Tympanic, resp. rate 18, height 1.854 m (6' 1\"), weight 99.8 kg (220 lb), SpO2 95%.       Assessment/Plan   1. Lumbar neuritis  Epidural Steroid Injection    Epidural Steroid Injection    FL pain management    FL pain management           Ar Jean MD    "

## 2024-10-09 ENCOUNTER — PHARMACY VISIT (OUTPATIENT)
Dept: PHARMACY | Facility: CLINIC | Age: 69
End: 2024-10-09
Payer: COMMERCIAL

## 2024-10-20 NOTE — PROGRESS NOTES
Subjective   Patient ID: Robbie Tovar is a 69 y.o. male.    Current Outpatient Medications (Ophthalmology pharm classes)   Medication Sig Dispense Refill    ketotifen (Zaditor) 0.025 % (0.035 %) ophthalmic solution Administer 1 drop into both eyes 2 times a day. 5 mL 5    neomycin-polymyxin-HC (Cortisporin) 3.5-10,000-10 mg-unit-mg/mL ophthalmic suspension Instill 2 drops into the affected eye every 4 hours while awake 7.5 mL 0    tobramycin-dexamethasone (Tobradex) ophthalmic suspension Instill 1 drop in right eye four times a day for 3 days, then three times a day for 2 days, then twice daily for 1 day 5 mL 0     Current Outpatient Medications (Other)   Medication Sig Dispense Refill    alpha lipoic acid 50 mg capsule Take by mouth.      benzonatate (Tessalon) 200 mg capsule Take 1 capsule (200 mg) by mouth 3 times a day. 30 capsule 0    celecoxib (CeleBREX) 200 mg capsule Take 1 capsule (200 mg) by mouth 2 times a day as needed for mild pain (1 - 3) 180 capsule 1    fluticasone (Flonase) 50 mcg/actuation nasal spray Administer 2 sprays into each nostril once daily 16 g 0    fluticasone (Flonase) 50 mcg/actuation nasal spray Administer 2 sprays into each nostril once daily. 48 g 1    fluticasone propion-salmeteroL (Advair Diskus) 250-50 mcg/dose diskus inhaler Inhale 1 puff 2 times a day. 60 each 0    levoFLOXacin (Levaquin) 500 mg tablet Take 1 tablet (500 mg) by mouth once daily. 10 tablet 0    methylPREDNISolone (Medrol, Alistair,) 4 mg tablets Use as directed on package 21 tablet 0    pantoprazole (ProtoNix) 40 mg EC tablet Take 1 tablet (40 mg) by mouth once every 24 hours. 90 tablet 3    vit B complex no.12/niacin,B3, (VITAMIN B COMPLEX NO.12-NIACIN ORAL) Take by mouth.      zolpidem (Ambien) 10 mg tablet TAKE 1 TABLET (10 MG) BY MOUTH AS NEEDED AT BEDTIME FOR SLEEP. 90 tablet 1       Objective   Base Eye Exam       Visual Acuity (Snellen - Linear)         Right Left    Dist cc 20/20 -2 20/20 -2               Tonometry (Goldmann Applanation, 10:14 AM)         Right Left    Pressure 17 14              Pupils         Pupils    Right PERRL, No APD    Left PERRL, No APD              Neuro/Psych       Oriented x3: Yes    Mood/Affect: Normal              Dilation       Both eyes: 1% Mydriacyl & 2.5% Timur  @ 10:16 AM                  Slit Lamp and Fundus Exam       External Exam         Right Left    External Normal Normal              Slit Lamp Exam         Right Left    Lids/Lashes Mild papillary reaction Mild papillary reaction    Conjunctiva/Sclera tr injection tr injection    Cornea (-) infiltrate, abrasion, dendrite Clear    Anterior Chamber Deep and quiet Deep and quiet    Iris Round and reactive Round and reactive    Lens Clear Clear    Anterior Vitreous Normal undilated              Fundus Exam         Right Left    Disc Normal Normal    C/D Ratio 0.3 0.3    Macula Normal Normal    Vessels Normal Normal    Periphery Normal Normal                  Imaging    Macula OCT 10/21/24  Right eye (OD): Normal contour and appearance, no IRF/subretinal fluid (SRF)  Left eye (OS): Normal contour and appearance, no IRF/subretinal fluid (SRF)      Assessment/Plan     Last saw me 6/17/24    Saw Dr. Calhoun for new Mrx 7/17/24    +joint pain (arthritis)  + sinus infection  + no ulcers    #Scleritis right eye  -Diagnosed 4/18/24 [facility], started on PF q1h and moxi QID  -At visit 4/25, had subjective improvement, and has improved scleral injection today  -DFE notable for 360 choroidals, intraocular pressure (IOP) 21  -Obtained laboratory workup : CBC/CMP, ESR/CRP, antinuclear antibodies test (RIGO), anti-dsDNA, RF, anti-neutrophil cytoplasmic antibodies (ANCA), syphilis, TB, ACE/Lysozyme - all normal  -Responded well to topical PF, has since tapered off without recurrence  -Given absence of underlying condition thus far, single episode that responded very well to topical medication, will monitor for now.   -Discussed with patient option  of fluorescein angiography (FA), however given quiet/asymptomatic today can hold off for now.   -Vision and symptoms stable, No pain/discomfort  -No signs of scleritis today, No anterior chamber inflammation, no vitritis, no recurrence of choroidals  -Mild papillary reaction both eyes consistent with allergic conjunctivitis (patient with known hx)  -Prescribed Zatidor BID both eyes for allergic conjunctivits  -Follow  up 6 months sooner PRN

## 2024-10-21 ENCOUNTER — APPOINTMENT (OUTPATIENT)
Dept: OPHTHALMOLOGY | Facility: CLINIC | Age: 69
End: 2024-10-21
Payer: COMMERCIAL

## 2024-10-21 DIAGNOSIS — H10.13 ALLERGIC CONJUNCTIVITIS OF BOTH EYES: ICD-10-CM

## 2024-10-21 DIAGNOSIS — H15.001 SCLERITIS, RIGHT: Primary | ICD-10-CM

## 2024-10-21 PROCEDURE — RXMED WILLOW AMBULATORY MEDICATION CHARGE

## 2024-10-21 PROCEDURE — 99213 OFFICE O/P EST LOW 20 MIN: CPT | Performed by: OPHTHALMOLOGY

## 2024-10-21 PROCEDURE — 92134 CPTRZ OPH DX IMG PST SGM RTA: CPT | Performed by: OPHTHALMOLOGY

## 2024-10-21 RX ORDER — KETOTIFEN FUMARATE 0.35 MG/ML
1 SOLUTION/ DROPS OPHTHALMIC 2 TIMES DAILY
Qty: 5 ML | Refills: 5 | Status: SHIPPED | OUTPATIENT
Start: 2024-10-21 | End: 2024-12-12

## 2024-10-21 ASSESSMENT — CUP TO DISC RATIO
OD_RATIO: 0.3
OS_RATIO: 0.3

## 2024-10-21 ASSESSMENT — ENCOUNTER SYMPTOMS
ENDOCRINE NEGATIVE: 0
MUSCULOSKELETAL NEGATIVE: 0
GASTROINTESTINAL NEGATIVE: 0
CARDIOVASCULAR NEGATIVE: 0
HEMATOLOGIC/LYMPHATIC NEGATIVE: 0
CONSTITUTIONAL NEGATIVE: 0
EYES NEGATIVE: 1
RESPIRATORY NEGATIVE: 0
ALLERGIC/IMMUNOLOGIC NEGATIVE: 0
PSYCHIATRIC NEGATIVE: 0
NEUROLOGICAL NEGATIVE: 0

## 2024-10-21 ASSESSMENT — VISUAL ACUITY
OS_CC+: -2
OD_CC+: -2
OS_CC: 20/20
METHOD: SNELLEN - LINEAR
OD_CC: 20/20

## 2024-10-21 ASSESSMENT — EXTERNAL EXAM - LEFT EYE: OS_EXAM: NORMAL

## 2024-10-21 ASSESSMENT — TONOMETRY
OD_IOP_MMHG: 17
OS_IOP_MMHG: 14
IOP_METHOD: GOLDMANN APPLANATION

## 2024-10-21 ASSESSMENT — SLIT LAMP EXAM - LIDS
COMMENTS: MILD PAPILLARY REACTION
COMMENTS: MILD PAPILLARY REACTION

## 2024-10-21 ASSESSMENT — EXTERNAL EXAM - RIGHT EYE: OD_EXAM: NORMAL

## 2024-10-23 ENCOUNTER — PHARMACY VISIT (OUTPATIENT)
Dept: PHARMACY | Facility: CLINIC | Age: 69
End: 2024-10-23
Payer: COMMERCIAL

## 2024-10-30 ENCOUNTER — PHARMACY VISIT (OUTPATIENT)
Dept: PHARMACY | Facility: CLINIC | Age: 69
End: 2024-10-30
Payer: COMMERCIAL

## 2024-10-30 PROCEDURE — RXMED WILLOW AMBULATORY MEDICATION CHARGE

## 2024-10-31 PROCEDURE — RXMED WILLOW AMBULATORY MEDICATION CHARGE

## 2024-11-01 ENCOUNTER — PHARMACY VISIT (OUTPATIENT)
Dept: PHARMACY | Facility: CLINIC | Age: 69
End: 2024-11-01
Payer: COMMERCIAL

## 2024-11-12 ENCOUNTER — APPOINTMENT (OUTPATIENT)
Dept: OPHTHALMOLOGY | Facility: CLINIC | Age: 69
End: 2024-11-12
Payer: MEDICARE

## 2024-11-25 ENCOUNTER — PHARMACY VISIT (OUTPATIENT)
Dept: PHARMACY | Facility: CLINIC | Age: 69
End: 2024-11-25
Payer: COMMERCIAL

## 2024-11-25 PROCEDURE — RXMED WILLOW AMBULATORY MEDICATION CHARGE

## 2024-11-25 RX ORDER — METHYLPREDNISOLONE 4 MG/1
TABLET ORAL
Qty: 21 TABLET | Refills: 0 | OUTPATIENT
Start: 2024-11-25

## 2024-11-25 RX ORDER — AZITHROMYCIN 250 MG/1
TABLET, FILM COATED ORAL
Qty: 6 TABLET | Refills: 0 | OUTPATIENT
Start: 2024-11-25

## 2024-11-26 DIAGNOSIS — G89.29 CHRONIC PAIN OF RIGHT KNEE: Primary | ICD-10-CM

## 2024-11-26 DIAGNOSIS — M25.561 CHRONIC PAIN OF RIGHT KNEE: Primary | ICD-10-CM

## 2024-12-03 PROCEDURE — RXMED WILLOW AMBULATORY MEDICATION CHARGE

## 2024-12-06 ENCOUNTER — PHARMACY VISIT (OUTPATIENT)
Dept: PHARMACY | Facility: CLINIC | Age: 69
End: 2024-12-06
Payer: COMMERCIAL

## 2024-12-06 ENCOUNTER — APPOINTMENT (OUTPATIENT)
Dept: PRIMARY CARE | Facility: CLINIC | Age: 69
End: 2024-12-06
Payer: COMMERCIAL

## 2024-12-06 VITALS
WEIGHT: 233 LBS | BODY MASS INDEX: 30.88 KG/M2 | HEIGHT: 73 IN | SYSTOLIC BLOOD PRESSURE: 142 MMHG | DIASTOLIC BLOOD PRESSURE: 100 MMHG

## 2024-12-06 DIAGNOSIS — Z00.00 HEALTHCARE MAINTENANCE: Primary | ICD-10-CM

## 2024-12-06 DIAGNOSIS — G47.00 INSOMNIA, UNSPECIFIED TYPE: ICD-10-CM

## 2024-12-06 DIAGNOSIS — M17.12 ARTHRITIS OF KNEE, LEFT: ICD-10-CM

## 2024-12-06 PROCEDURE — RXMED WILLOW AMBULATORY MEDICATION CHARGE

## 2024-12-06 RX ORDER — ZOLPIDEM TARTRATE 10 MG/1
10 TABLET ORAL NIGHTLY PRN
Qty: 90 TABLET | Refills: 1 | Status: SHIPPED | OUTPATIENT
Start: 2024-12-06 | End: 2025-06-04

## 2024-12-06 RX ORDER — CELECOXIB 200 MG/1
200 CAPSULE ORAL 2 TIMES DAILY PRN
Qty: 180 CAPSULE | Refills: 1 | Status: SHIPPED | OUTPATIENT
Start: 2024-12-06

## 2024-12-09 ENCOUNTER — PHARMACY VISIT (OUTPATIENT)
Dept: PHARMACY | Facility: CLINIC | Age: 69
End: 2024-12-09
Payer: COMMERCIAL

## 2024-12-17 ENCOUNTER — APPOINTMENT (OUTPATIENT)
Dept: NEUROSURGERY | Facility: CLINIC | Age: 69
End: 2024-12-17
Payer: COMMERCIAL

## 2024-12-17 VITALS
HEART RATE: 77 BPM | DIASTOLIC BLOOD PRESSURE: 95 MMHG | BODY MASS INDEX: 31.23 KG/M2 | HEIGHT: 73 IN | WEIGHT: 235.6 LBS | SYSTOLIC BLOOD PRESSURE: 160 MMHG | RESPIRATION RATE: 16 BRPM | TEMPERATURE: 97.7 F

## 2024-12-17 DIAGNOSIS — M54.42 CHRONIC LEFT-SIDED LOW BACK PAIN WITH LEFT-SIDED SCIATICA: Primary | ICD-10-CM

## 2024-12-17 DIAGNOSIS — G89.29 CHRONIC LEFT-SIDED LOW BACK PAIN WITH LEFT-SIDED SCIATICA: Primary | ICD-10-CM

## 2024-12-17 PROCEDURE — 1036F TOBACCO NON-USER: CPT | Performed by: NEUROLOGICAL SURGERY

## 2024-12-17 PROCEDURE — 1125F AMNT PAIN NOTED PAIN PRSNT: CPT | Performed by: NEUROLOGICAL SURGERY

## 2024-12-17 PROCEDURE — 3008F BODY MASS INDEX DOCD: CPT | Performed by: NEUROLOGICAL SURGERY

## 2024-12-17 PROCEDURE — 3080F DIAST BP >= 90 MM HG: CPT | Performed by: NEUROLOGICAL SURGERY

## 2024-12-17 PROCEDURE — 99204 OFFICE O/P NEW MOD 45 MIN: CPT | Performed by: NEUROLOGICAL SURGERY

## 2024-12-17 PROCEDURE — 1159F MED LIST DOCD IN RCRD: CPT | Performed by: NEUROLOGICAL SURGERY

## 2024-12-17 PROCEDURE — 3077F SYST BP >= 140 MM HG: CPT | Performed by: NEUROLOGICAL SURGERY

## 2024-12-17 RX ORDER — BRIMONIDINE TARTRATE 0.025 %
DROPS OPHTHALMIC (EYE)
COMMUNITY

## 2024-12-17 ASSESSMENT — ENCOUNTER SYMPTOMS
OCCASIONAL FEELINGS OF UNSTEADINESS: 0
LOSS OF SENSATION IN FEET: 0
DEPRESSION: 0

## 2024-12-17 ASSESSMENT — PAIN SCALES - GENERAL: PAINLEVEL_OUTOF10: 5

## 2024-12-17 ASSESSMENT — PATIENT HEALTH QUESTIONNAIRE - PHQ9
1. LITTLE INTEREST OR PLEASURE IN DOING THINGS: NOT AT ALL
SUM OF ALL RESPONSES TO PHQ9 QUESTIONS 1 AND 2: 0
2. FEELING DOWN, DEPRESSED OR HOPELESS: NOT AT ALL

## 2024-12-17 NOTE — PROGRESS NOTES
[facility] [place]  Department of Neurological Surgery  New Patient Visit    History of Present Illness:  Robbie Tovar is a 69 y.o. year old male who presents to the spine clinic with exquisite left buttock pain that will travel down the back lateral aspect of his thigh little bit in the lateral aspect of his calf and over the dorsum of his foot.  This has been going on now for quite some time.  He had an MRI a year ago that showed some degenerative changes.  He has had a few epidural injections but they are lasting for shorter and shorter periods.  He is not having any right leg pain except he does have significant right knee pain and that is related to his right knee replacement.  He says to this day he cannot bend his knee well.  He is contemplating having a revision surgery on that side performed.  The left pain is predominantly in the low back and buttock region the leg symptoms are his minor concern but are getting more problematic.  He has not had any recent physical therapy.  He is asking about a chiropractor.  I think as long as they are not doing aggressive manipulation they would be terrific addition to his care.  I also suggested any home remedies like heat, massage, and over-the-counter rubs.  Because I want to make certain that he is not developing any instability I am going to order a set of plain x-rays with flexion-extension standing views.    Prior Spine Surgeries: None    Physical Therapy: In the past  Diabetic: No  Osteoporosis: Unknown  Patient's BMI is Body mass index is 31.08 kg/m².    14/14 systems reviewed and negative other than what is listed in the history of present illness    Patient Active Problem List   Diagnosis    Long term (current) use of anticoagulants    Presence of right artificial knee joint    Allergic rhinitis    Arthritis of knee, left    Arthritis of knee, right    Basal cell carcinoma of skin of other part of trunk    Benign essential HTN    Cervical  radiculopathy    Chronic left-sided low back pain with left-sided sciatica    Chronic sinusitis    Diseases of lips    GERD (gastroesophageal reflux disease)    Insomnia    Keratosis, inflamed seborrheic    Loss of smell    Lumbar spondylosis    Lymphedema of leg    Nasal crusting    Nasal drainage    Neoplasm of uncertain behavior of skin    Arthrofibrosis of total knee arthroplasty, sequela    Decreased range of motion of right knee    OA (osteoarthritis) of knee    Obesity    Hypertrophic condition of skin    Facial pressure    Paresthesia    Personal history of other malignant neoplasm of skin    Petechiae    Pityriasis versicolor    Plantar fasciitis    Pre-diabetes    Seasonal allergies    Vitamin D deficiency    Cervicalgia    Impaired functional mobility, balance, and endurance    Sacroiliitis (CMS-HCC)    Acquired leg length discrepancy    Abdominal pain    Acute bronchitis    Cough    Disease due to severe acute respiratory syndrome coronavirus 2 (SARS-CoV-2)    Impacted cerumen of left ear    Knee pain    Nasal congestion    Pain of hand    Spondylolysis    EKC (epidemic keratoconjunctivitis)    Scleritis, right    Hyperopia with presbyopia of both eyes     Past Medical History:   Diagnosis Date    Chronic sinusitis, unspecified 09/21/2016    Clinical sinusitis    Plantar fascial fibromatosis 09/21/2016    Plantar fasciitis    Primary insomnia 09/21/2016    Primary insomnia     Past Surgical History:   Procedure Laterality Date    OTHER SURGICAL HISTORY  05/18/2021    Nasal septoplasty    OTHER SURGICAL HISTORY  05/18/2021    Tonsillectomy with adenoidectomy    OTHER SURGICAL HISTORY  05/12/2020    Hernia repair     Social History     Tobacco Use    Smoking status: Never     Passive exposure: Never    Smokeless tobacco: Never   Substance Use Topics    Alcohol use: Never     family history includes Allergies in his mother and sister; Hypertension in his father and mother; cardiac disorder in his  mother.    Current Outpatient Medications:     alpha lipoic acid 50 mg capsule, Take by mouth., Disp: , Rfl:     brimonidine (Lumify) 0.025 % drops, Administer into affected eye(s)., Disp: , Rfl:     celecoxib (CeleBREX) 200 mg capsule, Take 1 capsule (200 mg) by mouth 2 times a day as needed for mild pain (1 - 3), Disp: 180 capsule, Rfl: 1    fluticasone (Flonase) 50 mcg/actuation nasal spray, Administer 2 sprays into each nostril once daily, Disp: 16 g, Rfl: 0    pantoprazole (ProtoNix) 40 mg EC tablet, Take 1 tablet (40 mg) by mouth once every 24 hours., Disp: 90 tablet, Rfl: 3    polyvinyl alcohol/povidone/PF (REFRESH CLASSIC, PF, OPHT), Administer into affected eye(s)., Disp: , Rfl:     zolpidem (Ambien) 10 mg tablet, TAKE 1 TABLET (10 MG) BY MOUTH AS NEEDED AT BEDTIME FOR SLEEP., Disp: 90 tablet, Rfl: 1    fluticasone (Flonase) 50 mcg/actuation nasal spray, Administer 2 sprays into each nostril once daily., Disp: 48 g, Rfl: 1    Current Facility-Administered Medications:     diazePAM (Valium) tablet 5 mg, 5 mg, oral, Once, Ar Jean MD  Allergies   Allergen Reactions    Iodinated Contrast Media Nausea/vomiting       Physical Examination      General: NAD, AOx 3,  no aphasia or dysarthria, normal fund of knowledge  Cranial Nerves II-XII: VFF, PERRL, EOMI, Face Symm, Facial SILT, Palate/Tongue midline and symmetric  Motor: 5/5 Throughout all extremities,  No drift, no dysmetria on finger to nose  Sensation: SILT and PP throughout all extremities  DTRS: 2+ Throughout, No Hoffmans or Clonus  Gait is normal nonantalgic, he could toe and heel walk, he has a negative Romberg    Results    I personally reviewed and interpreted the imaging results which included the MRI from a year ago that shows some annular bulging and a few hemangiomas in the vertebral bodies.    Assessment and Plan:    Robbie Tovar is a 69 y.o. year old male who presents to the spine clinic with some anxiety that he is going to need surgery  more than the need for surgery.  He has mechanical back pain.  He wants to get back to his workout routine.  I am going to give him the prescription for physical therapy.  They are going to work with core strengthening and outline a workout routine for the patient.  Most importantly I think the matter what he does at the gym if he starts slow and build up over several months he will be much happier.  He can follow-up with me on an as-needed basis.  Because I want to make certain that he is not developing any instability I am going to order a set of plain x-rays with flexion-extension standing views.        I have reviewed all prior documentation and reviewed the electronic medical record since admission. I have personally have reviewed all advanced imaging not just the reports and used my interpretation as documented as the relevant findings. I have reviewed the risks and benefits of all treatment recommendations listed in this note with the patient and family.       The above clinical summary has been dictated with voice recognition software. It has not been proofread for grammatical errors, typographical mistakes, or other semantic inconsistencies.    Thank you for visiting our office today. It was our pleasure to take part in your healthcare.     Do not hesitate to call with any questions regarding your plan of care after leaving. My office can be reached at [phone] M-F 8am-4pm.     To clinicians, thank you very much for this kind referral. It is a privilege to partner with you in the care of your patients. My office would be delighted to assist you with any further consultations or with questions regarding the plan of care outlined. Do not hesitate to call the office or contact me directly.     Sincerely,    Zafar Ochoa MD, FAANS, FACS  Board Certified Neurological Surgeon  , Department of Neurological Surgery  [facility] [employer]    [facility]  [facility]  [address]  [address]  [place] [address]    [facility]  [address]  [address]  [place] [address]    Phone: [phone]  Fax: [phone]

## 2025-01-03 PROCEDURE — RXMED WILLOW AMBULATORY MEDICATION CHARGE

## 2025-01-06 DIAGNOSIS — J32.9 SINUSITIS, UNSPECIFIED CHRONICITY, UNSPECIFIED LOCATION: Primary | ICD-10-CM

## 2025-01-06 PROCEDURE — RXMED WILLOW AMBULATORY MEDICATION CHARGE

## 2025-01-06 RX ORDER — AZELASTINE 1 MG/ML
1 SPRAY, METERED NASAL 2 TIMES DAILY
Qty: 30 ML | Refills: 5 | Status: SHIPPED | OUTPATIENT
Start: 2025-01-06 | End: 2025-07-05

## 2025-01-07 ENCOUNTER — PHARMACY VISIT (OUTPATIENT)
Dept: PHARMACY | Facility: CLINIC | Age: 70
End: 2025-01-07
Payer: COMMERCIAL

## 2025-01-08 ENCOUNTER — OFFICE VISIT (OUTPATIENT)
Dept: PAIN MEDICINE | Facility: CLINIC | Age: 70
End: 2025-01-08
Payer: COMMERCIAL

## 2025-01-08 VITALS
HEART RATE: 66 BPM | SYSTOLIC BLOOD PRESSURE: 141 MMHG | HEIGHT: 73 IN | OXYGEN SATURATION: 97 % | DIASTOLIC BLOOD PRESSURE: 81 MMHG | RESPIRATION RATE: 15 BRPM | TEMPERATURE: 97 F | BODY MASS INDEX: 31.14 KG/M2 | WEIGHT: 235 LBS

## 2025-01-08 DIAGNOSIS — M51.26 LUMBAR DISC HERNIATION: ICD-10-CM

## 2025-01-08 DIAGNOSIS — M17.12 OSTEOARTHRITIS OF LEFT KNEE, UNSPECIFIED OSTEOARTHRITIS TYPE: ICD-10-CM

## 2025-01-08 DIAGNOSIS — M54.16 LUMBAR NEURITIS: Primary | ICD-10-CM

## 2025-01-08 DIAGNOSIS — M47.816 LUMBAR SPONDYLOSIS: ICD-10-CM

## 2025-01-08 PROCEDURE — 99214 OFFICE O/P EST MOD 30 MIN: CPT | Performed by: ANESTHESIOLOGY

## 2025-01-08 ASSESSMENT — ENCOUNTER SYMPTOMS
LOSS OF SENSATION IN FEET: 0
OCCASIONAL FEELINGS OF UNSTEADINESS: 0

## 2025-01-08 ASSESSMENT — PATIENT HEALTH QUESTIONNAIRE - PHQ9
2. FEELING DOWN, DEPRESSED OR HOPELESS: NOT AT ALL
SUM OF ALL RESPONSES TO PHQ9 QUESTIONS 1 AND 2: 0
1. LITTLE INTEREST OR PLEASURE IN DOING THINGS: NOT AT ALL

## 2025-01-08 ASSESSMENT — PAIN SCALES - GENERAL
PAINLEVEL_OUTOF10: 7
PAINLEVEL_OUTOF10: 7

## 2025-01-08 ASSESSMENT — COLUMBIA-SUICIDE SEVERITY RATING SCALE - C-SSRS
2. HAVE YOU ACTUALLY HAD ANY THOUGHTS OF KILLING YOURSELF?: NO
6. HAVE YOU EVER DONE ANYTHING, STARTED TO DO ANYTHING, OR PREPARED TO DO ANYTHING TO END YOUR LIFE?: NO
1. IN THE PAST MONTH, HAVE YOU WISHED YOU WERE DEAD OR WISHED YOU COULD GO TO SLEEP AND NOT WAKE UP?: NO

## 2025-01-08 ASSESSMENT — PAIN DESCRIPTION - DESCRIPTORS: DESCRIPTORS: SHARP;THROBBING

## 2025-01-08 ASSESSMENT — PAIN - FUNCTIONAL ASSESSMENT: PAIN_FUNCTIONAL_ASSESSMENT: 0-10

## 2025-01-08 NOTE — PROGRESS NOTES
History Of Present Illness  Robbie Tovar is a 69 y.o. male presenting with   Chief Complaint   Patient presents with    Follow-up       Patient RETURNS for chronic left medial knee pain for over 1 years time. He denies any clicking or buckling. He also has a hx of left sided low back pain to the LLE. The pain is constant, worse with walking / bending activity and better with rest/sitting. The pain is an aching and throbbing like sensation towards the LEFT SIDE. Denies LE paresthesias,  saddle anesthesia, bowel or bladder incontinence. To manage this pain the patient has attempted CELEBREX with 30-60% relief at times.  The patients chronic GERD, allergies, insomnia are stable on medication management.     PAIN SCORE: 7-8/10.    PP: Dr. Shirley, Dr. Lee     Past Medical History  He has a past medical history of Chronic sinusitis, unspecified (09/21/2016), Plantar fascial fibromatosis (09/21/2016), and Primary insomnia (09/21/2016).    Surgical History  He has a past surgical history that includes Other surgical history (05/18/2021); Other surgical history (05/18/2021); and Other surgical history (05/12/2020).     Social History  He reports that he has never smoked. He has never been exposed to tobacco smoke. He has never used smokeless tobacco. He reports that he does not drink alcohol and does not use drugs.    Family History  Family History   Problem Relation Name Age of Onset    Allergies Mother      Other (cardiac disorder) Mother          stent 70s    Hypertension Mother      Hypertension Father      Allergies Sister      Glaucoma Neg Hx      Macular degeneration Neg Hx          Allergies  Iodinated contrast media    Review of Systems    All other systems reviewed and negative for any deficits. Pertinent positives and negatives were considered in the medical decision making process.        Physical Exam  /81   Pulse 66   Temp 36.1 °C (97 °F)   Resp 15   Wt 107 kg (235 lb)   SpO2 97%     RECALL FROM  PREVIOUS VISIT:     General: Pt appears stated age    Eyes: Conjunctiva non-icteric and lids without obvious rash or drooping. Pupils are symmetric.     Conjunctiva injected    ENT: External ears and nose appear to be without deformity or rash. No lesions or masses noted. Hearing is grossly intact    Neck: No JVD noted, tracheal position midline. No goiter noted on assessment of thyroid    Respiratory: No gasping or shortness of breath noted, no use of accessory muscles noted    Cardiovascular: Extremities show no edema or varicosities    Skin: No rashes or open lesions/ulcers identified on skin. No induration/tightening noted with palpation of skin    Musculoskeletal: Gait is grossly normal    Digits/nails show no clubbing or cyanosis    Exam of muscles/joints/bones shows no gross atrophy and no abnormal/involuntary movements in the head/neckNo asymmetry or masses noted in the head/neck    Stability: no subluxation noted on movement of bilateral upper extremities or head/neck    Strength: 5/5 in RLE and 4/5 LLE     Range of Motion: WNL     Sensation: DEC HIP FLEXION IN RLE     Cranial nerves 2-12 are grossly intact    Psychiatric: Pt is alert and oriented to time, place and person.     ---crepitance noted in his left knee       Assessment/Plan   1. Lumbar spondylosis        2. Lumbar disc herniation        3. Osteoarthritis of left knee, unspecified osteoarthritis type             1. I have previously provided the patient with a list of physical therapy exercises to learn and perform to strengthen core, maintain stabilization, and reduce pain. We reviewed the exercises in detail and I encouraged them to perform them on a regular basis.    Would recommend elliptical  therapy.     2. I would recommend the pt continue Celebrex to help with nerve related pain. We discussed the risks, benefits, and side effects to this medication including the mechanism of action and the pt understands.     3. I previously  reviewed the patients MRI findings in detail, including review of the actual images and provided a detailed explanation of the findings using a spine model.     There is noted facet degenerative changes at the L4/5 and L5/S1 level with mild canal stenosis.     There is a disc herniation at the L4/5 and more prominently at the L5/S1 level.     4.  The patient is a candidate for an LESI at the L5/S1 vs  LEFT LTR at L4 and L5 versus  LESI L5/S1 to treat low back and radicular pain. I spent time with the patient discussing all of the risks, benefits, and alternatives to this measure. Including but not limited to worsening pain with injection, no improvement with pain, numbness in the lower extremity, vagal reaction, hypotension, feeling lightheaded/dizzy, spinal infection, epidural hematoma/abscess, paralysis, nerve injury, steroid effects, and spinal headache.The patient understands ALL of these risks and agrees to proceed with the planned procedure.    Will schedule with Valium 10mg PO.     His lumbar injection was on 4- and 6-6-2024 he reported ~50% relief of his pain that lasted for approx ~1 months time and has gradually returned.     5. I would recommend the pt start on Gabapentin 300mg at bedtime daily to help with nerve related pain. We discussed the risks, benefits, and side effects to this medication including the mechanism of action and the pt understands and agrees.    6. I extensively reviewed the patients x-ray 9- in detail, including review of the actual images and provided a detailed explanation of the findings using a model. The pt has evidence of mild to moderate degenerative changes of left knee.    7. The patient is a candidate for a LEFT KNEE JOINT STEROID INJECTION to TREAT KNEE PAIN. I spent time with the patient discussing the risks, benefits, and alternatives to this measure including, but not limited to worsening pain with injection, no improvement/guarantee with the procedure,  numbness in the lower extremity and risk of falls post procedure, vagal reaction/ hypotension, feeling dizzy / lightheaded, infection, worsening pre-existing infections, epidural hematoma, epidural abscess, nerve injury, paralysis, spinal fluid leak and spinal headache. The patient understands all of these risks and agrees to proceed with the planned procedure.     The pt last underwent a left knee joint injection in the office on 8- and he reported greater than 50% relief of his pain that lasted for several months time. He reports his knee pain has slowly returned.       Ar Jean MD    I spent time with the patient reviewing their imaging and discussing the risks benefits and alternatives to the above plan. A total of 30 minutes was spent reviewing the data and greater than 50% of that time was with the patient during the face to face encounter discussing treatment options both surgical, non-surgical, and minimally invasive techniques.       DATE OF PROCEDURE: 8-    PRE-PROCEDURE DIAGNOSIS: LEFT knee osteoarthritis    POST-PROCEDURE DIAGNOSIS: Same as above    PROCEDURE:    LEFT knee joint steroid injection    ANESTHESIA: Local    COMPLICATIONS: None    ATTENDING PHYSICIAN: Ar Jean M.D.    CLINICAL DATA: The patient is a 69 y.o. year old male presenting with a history of pain to the LEFT KNEE. The patient also has X-ray evidence of bilateral knee osteoarthritis. Her last injection was 5-.     PROCEDURE: The patient was identified in the preop area. After risks and benefits were discussed, informed consent was obtained. The patient was brought back to the procedure room and placed in the supine position. The patient's LEFT knee were prepped and draped in usual sterile fashion. The skin overlying the trajectory to the knee joint was anesthetized with a total of 5ml of 0.5% lidocaine via a one inch long 25 G needle. Next the joint was accessed with a 22G spinal needle using the  superior lateral approach. After negative aspiration for heme a total of 4ml of 0.5% Ropivacaine and 40mg of kenalog was injected. The needle was removed and a sterile dressing was applied. The patient tolerated the procedure well and was discharged home in good condition.    PLAN: The pt will follow up in the office in one to two months to report their results with the procedure. Discharge instructions were reviewed in recovery and provided to the patient in writing. They were advised to call should they have any questions or concerns.

## 2025-01-21 ENCOUNTER — PHARMACY VISIT (OUTPATIENT)
Dept: PHARMACY | Facility: CLINIC | Age: 70
End: 2025-01-21
Payer: COMMERCIAL

## 2025-01-21 PROCEDURE — RXMED WILLOW AMBULATORY MEDICATION CHARGE

## 2025-01-21 RX ORDER — METHYLPREDNISOLONE 4 MG/1
TABLET ORAL
Qty: 21 TABLET | Refills: 0 | OUTPATIENT
Start: 2025-01-21

## 2025-01-21 RX ORDER — AZITHROMYCIN 250 MG/1
TABLET, FILM COATED ORAL
Qty: 6 TABLET | Refills: 0 | OUTPATIENT
Start: 2025-01-21

## 2025-02-07 ENCOUNTER — APPOINTMENT (OUTPATIENT)
Dept: PAIN MEDICINE | Facility: CLINIC | Age: 70
End: 2025-02-07
Payer: COMMERCIAL

## 2025-02-12 ENCOUNTER — HOSPITAL ENCOUNTER (OUTPATIENT)
Dept: PAIN MEDICINE | Facility: CLINIC | Age: 70
Discharge: HOME | End: 2025-02-12
Payer: COMMERCIAL

## 2025-02-12 VITALS
HEIGHT: 73 IN | WEIGHT: 235 LBS | SYSTOLIC BLOOD PRESSURE: 162 MMHG | RESPIRATION RATE: 18 BRPM | BODY MASS INDEX: 31.14 KG/M2 | DIASTOLIC BLOOD PRESSURE: 78 MMHG | HEART RATE: 70 BPM | TEMPERATURE: 97.3 F | OXYGEN SATURATION: 95 %

## 2025-02-12 DIAGNOSIS — G89.29 CHRONIC PAIN OF LEFT KNEE: Primary | ICD-10-CM

## 2025-02-12 DIAGNOSIS — M25.562 CHRONIC PAIN OF LEFT KNEE: Primary | ICD-10-CM

## 2025-02-12 DIAGNOSIS — M54.16 LUMBAR NEURITIS: Primary | ICD-10-CM

## 2025-02-12 PROCEDURE — 2500000002 HC RX 250 W HCPCS SELF ADMINISTERED DRUGS (ALT 637 FOR MEDICARE OP, ALT 636 FOR OP/ED): Performed by: ANESTHESIOLOGY

## 2025-02-12 PROCEDURE — 62323 NJX INTERLAMINAR LMBR/SAC: CPT | Performed by: ANESTHESIOLOGY

## 2025-02-12 PROCEDURE — 2500000004 HC RX 250 GENERAL PHARMACY W/ HCPCS (ALT 636 FOR OP/ED): Performed by: ANESTHESIOLOGY

## 2025-02-12 PROCEDURE — 2500000005 HC RX 250 GENERAL PHARMACY W/O HCPCS: Performed by: ANESTHESIOLOGY

## 2025-02-12 RX ORDER — TRIAMCINOLONE ACETONIDE 40 MG/ML
INJECTION, SUSPENSION INTRA-ARTICULAR; INTRAMUSCULAR AS NEEDED
Status: COMPLETED | OUTPATIENT
Start: 2025-02-12 | End: 2025-02-12

## 2025-02-12 RX ORDER — LIDOCAINE HYDROCHLORIDE 5 MG/ML
INJECTION, SOLUTION INFILTRATION; INTRAVENOUS AS NEEDED
Status: COMPLETED | OUTPATIENT
Start: 2025-02-12 | End: 2025-02-12

## 2025-02-12 RX ORDER — DIAZEPAM 5 MG/1
10 TABLET ORAL ONCE
Status: COMPLETED | OUTPATIENT
Start: 2025-02-12 | End: 2025-02-12

## 2025-02-12 RX ORDER — SODIUM CHLORIDE 9 MG/ML
INJECTION, SOLUTION INTRAMUSCULAR; INTRAVENOUS; SUBCUTANEOUS AS NEEDED
Status: COMPLETED | OUTPATIENT
Start: 2025-02-12 | End: 2025-02-12

## 2025-02-12 RX ADMIN — DIAZEPAM 5 MG: 5 TABLET ORAL at 08:35

## 2025-02-12 RX ADMIN — SODIUM CHLORIDE 10 ML: 9 INJECTION, SOLUTION INTRAMUSCULAR; INTRAVENOUS; SUBCUTANEOUS at 09:14

## 2025-02-12 RX ADMIN — TRIAMCINOLONE ACETONIDE 40 MG: 40 INJECTION, SUSPENSION INTRA-ARTICULAR; INTRAMUSCULAR at 09:14

## 2025-02-12 RX ADMIN — LIDOCAINE HYDROCHLORIDE 10 ML: 5 INJECTION, SOLUTION INFILTRATION at 09:14

## 2025-02-12 SDOH — ECONOMIC STABILITY: FOOD INSECURITY: WITHIN THE PAST 12 MONTHS, YOU WORRIED THAT YOUR FOOD WOULD RUN OUT BEFORE YOU GOT MONEY TO BUY MORE.: NEVER TRUE

## 2025-02-12 SDOH — ECONOMIC STABILITY: FOOD INSECURITY: WITHIN THE PAST 12 MONTHS, THE FOOD YOU BOUGHT JUST DIDN'T LAST AND YOU DIDN'T HAVE MONEY TO GET MORE.: NEVER TRUE

## 2025-02-12 ASSESSMENT — ENCOUNTER SYMPTOMS
ARTHRALGIAS: 1
COUGH: 0
NUMBNESS: 1
SEIZURES: 0
DEPRESSION: 0
EYE DISCHARGE: 0
NECK STIFFNESS: 0
OCCASIONAL FEELINGS OF UNSTEADINESS: 0
CONSTIPATION: 0
SPEECH DIFFICULTY: 0
BACK PAIN: 1
LOSS OF SENSATION IN FEET: 0
SHORTNESS OF BREATH: 0
NECK PAIN: 0
DIAPHORESIS: 0
VOMITING: 0
CHILLS: 0
CHEST TIGHTNESS: 0
FEVER: 0
WHEEZING: 0
WEAKNESS: 0
DIARRHEA: 0
BLOOD IN STOOL: 0
DIFFICULTY URINATING: 0
NAUSEA: 0
DIZZINESS: 0

## 2025-02-12 ASSESSMENT — SOCIAL DETERMINANTS OF HEALTH (SDOH): IN THE PAST 12 MONTHS, HAS THE ELECTRIC, GAS, OIL, OR WATER COMPANY THREATENED TO SHUT OFF SERVICE IN YOUR HOME?: NO

## 2025-02-12 ASSESSMENT — PAIN - FUNCTIONAL ASSESSMENT: PAIN_FUNCTIONAL_ASSESSMENT: 0-10

## 2025-02-12 ASSESSMENT — PAIN SCALES - GENERAL
PAINLEVEL_OUTOF10: 5 - MODERATE PAIN
PAINLEVEL_OUTOF10: 6

## 2025-02-12 NOTE — H&P
History Of Present Illness  Robbie Tovar is a 69 y.o. male presenting with low back pain and radicular pain.     Past Medical History  Past Medical History:   Diagnosis Date    Chronic sinusitis, unspecified 09/21/2016    Clinical sinusitis    Low back pain     Plantar fascial fibromatosis 09/21/2016    Plantar fasciitis    Primary insomnia 09/21/2016    Primary insomnia       Surgical History  Past Surgical History:   Procedure Laterality Date    OTHER SURGICAL HISTORY  05/18/2021    Nasal septoplasty    OTHER SURGICAL HISTORY  05/18/2021    Tonsillectomy with adenoidectomy    OTHER SURGICAL HISTORY  05/12/2020    Hernia repair        Social History  He reports that he has never smoked. He has never been exposed to tobacco smoke. He has never used smokeless tobacco. He reports that he does not drink alcohol and does not use drugs.    Family History  Family History   Problem Relation Name Age of Onset    Allergies Mother      Other (cardiac disorder) Mother          stent 70s    Hypertension Mother      Hypertension Father      Allergies Sister      Glaucoma Neg Hx      Macular degeneration Neg Hx          Allergies  Iodinated contrast media    Review of Systems   Constitutional:  Negative for chills, diaphoresis and fever.   HENT:  Negative for ear discharge and tinnitus.    Eyes:  Negative for discharge.   Respiratory:  Negative for cough, chest tightness, shortness of breath and wheezing.    Cardiovascular:  Negative for chest pain.   Gastrointestinal:  Negative for blood in stool, constipation, diarrhea, nausea and vomiting.   Endocrine: Negative for polyuria.   Genitourinary:  Negative for difficulty urinating.   Musculoskeletal:  Positive for arthralgias, back pain and gait problem. Negative for neck pain and neck stiffness.   Skin:  Negative for rash.   Neurological:  Positive for numbness. Negative for dizziness, seizures, speech difficulty and weakness.        Physical Exam  Constitutional:        "Appearance: Normal appearance.   HENT:      Head: Normocephalic.      Nose: Nose normal.      Mouth/Throat:      Mouth: Mucous membranes are moist.      Pharynx: Oropharynx is clear.   Eyes:      Extraocular Movements: Extraocular movements intact.      Conjunctiva/sclera: Conjunctivae normal.      Pupils: Pupils are equal, round, and reactive to light.   Cardiovascular:      Rate and Rhythm: Normal rate.   Pulmonary:      Effort: Pulmonary effort is normal. No respiratory distress.      Breath sounds: No wheezing.   Chest:      Chest wall: No tenderness.   Abdominal:      Palpations: Abdomen is soft.   Musculoskeletal:      Cervical back: No rigidity.   Skin:     General: Skin is warm and dry.      Findings: No rash.   Neurological:      Mental Status: He is alert and oriented to person, place, and time.      Sensory: Sensory deficit present.      Motor: Weakness present.      Gait: Gait abnormal.   Psychiatric:         Mood and Affect: Mood normal.         Behavior: Behavior normal.          Last Recorded Vitals  Blood pressure 162/78, pulse 70, temperature 36.3 °C (97.3 °F), resp. rate 18, height 1.854 m (6' 1\"), weight 107 kg (235 lb), SpO2 95%.       Assessment/Plan   1. Lumbar neuritis  Epidural Steroid Injection    Epidural Steroid Injection    FL pain management    FL pain management    diazePAM (Valium) tablet 10 mg           Ar Jean MD    "

## 2025-02-14 PROCEDURE — RXMED WILLOW AMBULATORY MEDICATION CHARGE

## 2025-02-17 ENCOUNTER — PHARMACY VISIT (OUTPATIENT)
Dept: PHARMACY | Facility: CLINIC | Age: 70
End: 2025-02-17
Payer: COMMERCIAL

## 2025-02-24 ENCOUNTER — PHARMACY VISIT (OUTPATIENT)
Dept: PHARMACY | Facility: CLINIC | Age: 70
End: 2025-02-24
Payer: COMMERCIAL

## 2025-02-24 PROCEDURE — RXMED WILLOW AMBULATORY MEDICATION CHARGE

## 2025-02-24 RX ORDER — PREDNISONE 10 MG/1
10 TABLET ORAL DAILY
Qty: 14 TABLET | Refills: 0 | OUTPATIENT
Start: 2025-02-24 | End: 2025-03-10

## 2025-03-06 ENCOUNTER — PHARMACY VISIT (OUTPATIENT)
Dept: PHARMACY | Facility: CLINIC | Age: 70
End: 2025-03-06
Payer: COMMERCIAL

## 2025-03-06 PROCEDURE — RXMED WILLOW AMBULATORY MEDICATION CHARGE

## 2025-03-06 RX ORDER — FLUTICASONE PROPIONATE 50 MCG
2 SPRAY, SUSPENSION (ML) NASAL DAILY
Qty: 16 G | Refills: 3 | OUTPATIENT
Start: 2025-03-06

## 2025-03-28 ENCOUNTER — PHARMACY VISIT (OUTPATIENT)
Dept: PHARMACY | Facility: CLINIC | Age: 70
End: 2025-03-28
Payer: COMMERCIAL

## 2025-03-28 PROCEDURE — RXMED WILLOW AMBULATORY MEDICATION CHARGE

## 2025-04-03 ENCOUNTER — APPOINTMENT (OUTPATIENT)
Dept: OPHTHALMOLOGY | Facility: CLINIC | Age: 70
End: 2025-04-03
Payer: COMMERCIAL

## 2025-04-03 DIAGNOSIS — H15.001 SCLERITIS, RIGHT: Primary | ICD-10-CM

## 2025-04-03 DIAGNOSIS — H10.13 ALLERGIC CONJUNCTIVITIS OF BOTH EYES: ICD-10-CM

## 2025-04-03 PROCEDURE — RXMED WILLOW AMBULATORY MEDICATION CHARGE

## 2025-04-03 RX ORDER — OLOPATADINE HYDROCHLORIDE OPHTHALMIC 2 MG/ML
1 SOLUTION OPHTHALMIC DAILY
Qty: 2.5 ML | Refills: 5 | Status: SHIPPED | OUTPATIENT
Start: 2025-04-03 | End: 2025-05-03

## 2025-04-03 ASSESSMENT — ENCOUNTER SYMPTOMS
ENDOCRINE NEGATIVE: 0
RESPIRATORY NEGATIVE: 0
ALLERGIC/IMMUNOLOGIC NEGATIVE: 0
GASTROINTESTINAL NEGATIVE: 0
CONSTITUTIONAL NEGATIVE: 0
HEMATOLOGIC/LYMPHATIC NEGATIVE: 0
NEUROLOGICAL NEGATIVE: 0
EYES NEGATIVE: 0
MUSCULOSKELETAL NEGATIVE: 0
PSYCHIATRIC NEGATIVE: 0
CARDIOVASCULAR NEGATIVE: 0

## 2025-04-03 ASSESSMENT — VISUAL ACUITY
OS_CC: 20/20
OD_CC: 20/20
METHOD: SNELLEN - LINEAR

## 2025-04-03 ASSESSMENT — TONOMETRY
OS_IOP_MMHG: 14
OD_IOP_MMHG: 14
IOP_METHOD: GOLDMANN APPLANATION

## 2025-04-03 NOTE — PROGRESS NOTES
Subjective   Patient ID: Robbie Tovar is a 69 y.o. male.    No current outpatient medications on file. (Ophthalmology pharm classes)       Current Outpatient Medications (Other)   Medication Sig Dispense Refill    alpha lipoic acid 50 mg capsule Take by mouth. (Patient not taking: Reported on 2/12/2025)      azelastine (Astelin) 137 mcg (0.1 %) nasal spray Administer 1 spray into each nostril 2 times a day. Use in each nostril as directed 30 mL 5    azithromycin (Zithromax Z-Alistair) 250 mg tablet Take 2 tablets by mouth on day 1 then take 1 tablet daily until gone (Patient not taking: Reported on 2/12/2025) 6 tablet 0    celecoxib (CeleBREX) 200 mg capsule Take 1 capsule (200 mg) by mouth 2 times a day as needed for mild pain (1 - 3) 180 capsule 1    fluticasone (Flonase Allergy Relief) 50 mcg/actuation nasal spray Administer 2 sprays into each nostril once daily. 16 g 3    fluticasone (Flonase) 50 mcg/actuation nasal spray Administer 2 sprays into each nostril once daily 16 g 0    fluticasone (Flonase) 50 mcg/actuation nasal spray Administer 2 sprays into each nostril once daily. 48 g 1    methylPREDNISolone (Medrol, Alistair,) 4 mg tablets Use as directed on package (Patient not taking: Reported on 2/12/2025) 21 tablet 0    pantoprazole (ProtoNix) 40 mg EC tablet Take 1 tablet (40 mg) by mouth once every 24 hours. 90 tablet 3    zolpidem (Ambien) 10 mg tablet TAKE 1 TABLET (10 MG) BY MOUTH AS NEEDED AT BEDTIME FOR SLEEP. 90 tablet 1     Imaging    Macula OCT 04/03/25  Right eye (OD): Normal contour and appearance, no IRF/subretinal fluid (SRF)  Left eye (OS): Normal contour and appearance, no IRF/subretinal fluid (SRF)      Assessment/Plan     Last saw me 10/21/24    Saw Dr. Calhoun for new Mrx 7/17/24    +joint pain (arthritis)  + sinus infection  + no ulcers    #Scleritis right eye  -Diagnosed 4/18/24 [facility], started on PF q1h and moxi QID  -At visit 4/25, had subjective improvement, and has improved scleral injection  today  -DFE notable for 360 choroidals, intraocular pressure (IOP) 21  -Obtained laboratory workup : CBC/CMP, ESR/CRP, antinuclear antibodies test (RIGO), anti-dsDNA, RF, anti-neutrophil cytoplasmic antibodies (ANCA), syphilis, TB, ACE/Lysozyme - all normal  -Responded well to topical PF, has since tapered off without recurrence  -Given absence of underlying condition thus far, single episode that responded very well to topical medication, will monitor for now.   -Today, no recurrent symptoms/signs, stable exam  -Seeing Dr. Calhoun  -6 mon Joana Gonzalez, sooner PRN

## 2025-04-04 ENCOUNTER — PHARMACY VISIT (OUTPATIENT)
Dept: PHARMACY | Facility: CLINIC | Age: 70
End: 2025-04-04
Payer: COMMERCIAL

## 2025-04-04 PROCEDURE — RXMED WILLOW AMBULATORY MEDICATION CHARGE

## 2025-04-04 RX ORDER — DIAZEPAM 5 MG/1
5 TABLET ORAL 3 TIMES DAILY PRN
Qty: 20 TABLET | Refills: 0 | OUTPATIENT
Start: 2025-04-04

## 2025-04-04 ASSESSMENT — EXTERNAL EXAM - RIGHT EYE: OD_EXAM: NORMAL

## 2025-04-04 ASSESSMENT — EXTERNAL EXAM - LEFT EYE: OS_EXAM: NORMAL

## 2025-04-04 ASSESSMENT — SLIT LAMP EXAM - LIDS
COMMENTS: NORMAL
COMMENTS: NORMAL

## 2025-04-04 ASSESSMENT — CUP TO DISC RATIO
OS_RATIO: 0.3
OD_RATIO: 0.3

## 2025-04-08 ENCOUNTER — APPOINTMENT (OUTPATIENT)
Dept: OPHTHALMOLOGY | Facility: CLINIC | Age: 70
End: 2025-04-08
Payer: COMMERCIAL

## 2025-04-08 DIAGNOSIS — H52.4 HYPEROPIA WITH PRESBYOPIA OF BOTH EYES: Primary | ICD-10-CM

## 2025-04-08 DIAGNOSIS — H52.03 HYPEROPIA WITH PRESBYOPIA OF BOTH EYES: Primary | ICD-10-CM

## 2025-04-08 PROCEDURE — 92012 INTRM OPH EXAM EST PATIENT: CPT | Performed by: OPTOMETRIST

## 2025-04-08 PROCEDURE — 92015 DETERMINE REFRACTIVE STATE: CPT | Performed by: OPTOMETRIST

## 2025-04-08 ASSESSMENT — VISUAL ACUITY
METHOD: SNELLEN - LINEAR
OD_CC: 20/20-2
CORRECTION_TYPE: GLASSES
OS_CC: 20/20-2

## 2025-04-08 ASSESSMENT — REFRACTION_WEARINGRX
OD_SPHERE: +2.50
OS_ADD: +2.50
OD_ADD: +2.50
OS_SPHERE: +2.50

## 2025-04-08 ASSESSMENT — ENCOUNTER SYMPTOMS
ENDOCRINE NEGATIVE: 0
CONSTITUTIONAL NEGATIVE: 0
RESPIRATORY NEGATIVE: 0
MUSCULOSKELETAL NEGATIVE: 0
GASTROINTESTINAL NEGATIVE: 0
ALLERGIC/IMMUNOLOGIC NEGATIVE: 0
NEUROLOGICAL NEGATIVE: 0
CARDIOVASCULAR NEGATIVE: 0
HEMATOLOGIC/LYMPHATIC NEGATIVE: 0
EYES NEGATIVE: 1
PSYCHIATRIC NEGATIVE: 0

## 2025-04-08 ASSESSMENT — REFRACTION_MANIFEST
OD_ADD: +2.50
OS_ADD: +2.50
OD_SPHERE: +2.50
OS_SPHERE: +2.50

## 2025-04-08 ASSESSMENT — SLIT LAMP EXAM - LIDS
COMMENTS: NORMAL
COMMENTS: NORMAL

## 2025-04-08 ASSESSMENT — CUP TO DISC RATIO
OS_RATIO: 0.3
OD_RATIO: 0.3

## 2025-04-08 ASSESSMENT — EXTERNAL EXAM - RIGHT EYE: OD_EXAM: NORMAL

## 2025-04-08 ASSESSMENT — EXTERNAL EXAM - LEFT EYE: OS_EXAM: NORMAL

## 2025-04-09 ENCOUNTER — PHARMACY VISIT (OUTPATIENT)
Dept: PHARMACY | Facility: CLINIC | Age: 70
End: 2025-04-09
Payer: COMMERCIAL

## 2025-04-11 DIAGNOSIS — I83.891 VARICOSE VEINS OF RIGHT LOWER EXTREMITY WITH EDEMA: Primary | ICD-10-CM

## 2025-04-16 ENCOUNTER — OFFICE VISIT (OUTPATIENT)
Dept: PAIN MEDICINE | Facility: CLINIC | Age: 70
End: 2025-04-16
Payer: COMMERCIAL

## 2025-04-16 VITALS
TEMPERATURE: 97.5 F | OXYGEN SATURATION: 96 % | DIASTOLIC BLOOD PRESSURE: 78 MMHG | HEART RATE: 68 BPM | SYSTOLIC BLOOD PRESSURE: 140 MMHG | RESPIRATION RATE: 16 BRPM

## 2025-04-16 DIAGNOSIS — G89.29 CHRONIC PAIN OF LEFT KNEE: ICD-10-CM

## 2025-04-16 DIAGNOSIS — M51.26 LUMBAR DISC HERNIATION: ICD-10-CM

## 2025-04-16 DIAGNOSIS — M25.562 CHRONIC PAIN OF LEFT KNEE: ICD-10-CM

## 2025-04-16 DIAGNOSIS — M54.16 LUMBAR NEURITIS: Primary | ICD-10-CM

## 2025-04-16 DIAGNOSIS — M47.816 LUMBAR SPONDYLOSIS: ICD-10-CM

## 2025-04-16 PROCEDURE — 99214 OFFICE O/P EST MOD 30 MIN: CPT | Performed by: ANESTHESIOLOGY

## 2025-04-16 ASSESSMENT — ENCOUNTER SYMPTOMS
OCCASIONAL FEELINGS OF UNSTEADINESS: 0
LOSS OF SENSATION IN FEET: 0
DEPRESSION: 0

## 2025-04-16 ASSESSMENT — PAIN DESCRIPTION - DESCRIPTORS: DESCRIPTORS: BURNING;ACHING

## 2025-04-16 ASSESSMENT — PAIN SCALES - GENERAL
PAINLEVEL_OUTOF10: 9
PAINLEVEL_OUTOF10: 9

## 2025-04-16 ASSESSMENT — PAIN - FUNCTIONAL ASSESSMENT: PAIN_FUNCTIONAL_ASSESSMENT: 0-10

## 2025-04-16 NOTE — PROGRESS NOTES
History Of Present Illness  Robbie Tovar is a 69 y.o. male presenting with   Chief Complaint   Patient presents with    Pain     Left knee pain         Patient follows up for chronic left medial knee pain for over 1 years time. He denies any clicking or buckling. He also has a hx of left sided low back pain to the LLE. The pain is constant, worse with walking / bending activity and better with rest/sitting. The pain is an aching and throbbing like sensation towards the LEFT SIDE. Denies LE paresthesias,  saddle anesthesia, bowel or bladder incontinence. To manage this pain the patient has attempted CELEBREX with 30-60% relief at times.  The patients chronic GERD, allergies, insomnia are stable on medication management.     PAIN SCORE: 9/10 in his LEFT knee    PP: Dr. Shirley, Dr. Lee     Past Medical History  He has a past medical history of Chronic sinusitis, unspecified (09/21/2016), Low back pain, Plantar fascial fibromatosis (09/21/2016), and Primary insomnia (09/21/2016).    Surgical History  He has a past surgical history that includes Other surgical history (05/18/2021); Other surgical history (05/18/2021); and Other surgical history (05/12/2020).     Social History  He reports that he has never smoked. He has never been exposed to tobacco smoke. He has never used smokeless tobacco. He reports that he does not drink alcohol and does not use drugs.    Family History  Family History   Problem Relation Name Age of Onset    Allergies Mother      Other (cardiac disorder) Mother          stent 70s    Hypertension Mother      Hypertension Father      Allergies Sister      Glaucoma Neg Hx      Macular degeneration Neg Hx          Allergies  Iodinated contrast media    Review of Systems    All other systems reviewed and negative for any deficits. Pertinent positives and negatives were considered in the medical decision making process.        Physical Exam  /78   Pulse 68   Temp 36.4 °C (97.5 °F)   Resp 16    SpO2 96%     RECALL FROM PREVIOUS VISIT:     General: Pt appears stated age    Eyes: Conjunctiva non-icteric and lids without obvious rash or drooping. Pupils are symmetric.     Conjunctiva injected    ENT: External ears and nose appear to be without deformity or rash. No lesions or masses noted. Hearing is grossly intact    Neck: No JVD noted, tracheal position midline. No goiter noted on assessment of thyroid    Respiratory: No gasping or shortness of breath noted, no use of accessory muscles noted    Cardiovascular: Extremities show no edema or varicosities    Skin: No rashes or open lesions/ulcers identified on skin. No induration/tightening noted with palpation of skin    Musculoskeletal: Gait is grossly normal    Digits/nails show no clubbing or cyanosis    Exam of muscles/joints/bones shows no gross atrophy and no abnormal/involuntary movements in the head/neckNo asymmetry or masses noted in the head/neck    Stability: no subluxation noted on movement of bilateral upper extremities or head/neck    Strength: 5/5 in RLE and 4/5 LLE     Range of Motion: WNL     Sensation: DEC HIP FLEXION IN RLE     Cranial nerves 2-12 are grossly intact    Psychiatric: Pt is alert and oriented to time, place and person.     ---crepitance noted in his left knee       Assessment/Plan   1. Chronic pain of left knee  Joint Injection/Aspiration           1. I have previously provided the patient with a list of physical therapy exercises to learn and perform to strengthen core, maintain stabilization, and reduce pain. We reviewed the exercises in detail and I encouraged them to perform them on a regular basis.    Would recommend elliptical  therapy.     2. I would recommend the pt continue Celebrex to help with nerve related pain. We discussed the risks, benefits, and side effects to this medication including the mechanism of action and the pt understands.     3. I previously reviewed the patients MRI findings in detail,  including review of the actual images and provided a detailed explanation of the findings using a spine model.     There is noted facet degenerative changes at the L4/5 and L5/S1 level with mild canal stenosis.     There is a disc herniation at the L4/5 and more prominently at the L5/S1 level.     4.  The patient is a candidate for an LESI at the L5/S1 vs  LEFT LTR at L4 and L5 versus  LESI L5/S1 to treat low back and radicular pain. I spent time with the patient discussing all of the risks, benefits, and alternatives to this measure. Including but not limited to worsening pain with injection, no improvement with pain, numbness in the lower extremity, vagal reaction, hypotension, feeling lightheaded/dizzy, spinal infection, epidural hematoma/abscess, paralysis, nerve injury, steroid effects, and spinal headache.The patient understands ALL of these risks and agrees to proceed with the planned procedure.    Will schedule with Valium 10mg PO.     His lumbar injection was on 4- and 6-6-2024 he reported ~50% relief of his pain that lasted for approx ~1 months time and has gradually returned.     5. I would recommend the pt start on Gabapentin 300mg at bedtime daily to help with nerve related pain. We discussed the risks, benefits, and side effects to this medication including the mechanism of action and the pt understands and agrees.    6. I extensively reviewed the patients x-ray 9- in detail, including review of the actual images and provided a detailed explanation of the findings using a model. The pt has evidence of mild to moderate degenerative changes of left knee.    7. The patient is a candidate for a LEFT KNEE JOINT STEROID INJECTION to TREAT KNEE PAIN. I spent time with the patient discussing the risks, benefits, and alternatives to this measure including, but not limited to worsening pain with injection, no improvement/guarantee with the procedure, numbness in the lower extremity and risk of  falls post procedure, vagal reaction/ hypotension, feeling dizzy / lightheaded, infection, worsening pre-existing infections, epidural hematoma, epidural abscess, nerve injury, paralysis, spinal fluid leak and spinal headache. The patient understands all of these risks and agrees to proceed with the planned procedure.     The pt last underwent a left knee joint injection in the office on 8- and he reported greater than 50% relief of his pain that lasted for several months time. He reports his knee pain has slowly returned.       Ar Jean MD    I spent time with the patient reviewing their imaging and discussing the risks benefits and alternatives to the above plan. A total of 30 minutes was spent reviewing the data and greater than 50% of that time was with the patient during the face to face encounter discussing treatment options both surgical, non-surgical, and minimally invasive techniques.       DATE OF PROCEDURE: 4-     PRE-PROCEDURE DIAGNOSIS: LEFT knee osteoarthritis    POST-PROCEDURE DIAGNOSIS: Same as above    PROCEDURE:    LEFT knee joint steroid injection    ANESTHESIA: Local    COMPLICATIONS: None    ATTENDING PHYSICIAN: Ar Jean M.D.    CLINICAL DATA: The patient is a 69 y.o. year old male presenting with a history of pain to the LEFT KNEE. The patient also has X-ray evidence of bilateral knee osteoarthritis. His last injection was 5-, 8-.     PROCEDURE: The patient was identified in the preop area. After risks and benefits were discussed, informed consent was obtained. The patient was brought back to the procedure room and placed in the supine position. The patient's LEFT knee were prepped and draped in usual sterile fashion. The skin overlying the trajectory to the knee joint was anesthetized with a total of 5ml of 0.5% lidocaine via a one inch long 25 G needle. Next the joint was accessed with a 22G spinal needle using the superior lateral approach. After  negative aspiration for heme a total of 4ml of 0.5% Ropivacaine and 40mg of kenalog was injected. The needle was removed and a sterile dressing was applied. The patient tolerated the procedure well and was discharged home in good condition.    PLAN: The pt will follow up in the office in one to two months to report their results with the procedure. Discharge instructions were reviewed in recovery and provided to the patient in writing. They were advised to call should they have any questions or concerns.

## 2025-04-16 NOTE — H&P (VIEW-ONLY)
History Of Present Illness  Robbie Tovar is a 69 y.o. male presenting with   Chief Complaint   Patient presents with    Pain     Left knee pain         Patient follows up for chronic left medial knee pain for over 1 years time. He denies any clicking or buckling. He also has a hx of left sided low back pain to the LLE. The pain is constant, worse with walking / bending activity and better with rest/sitting. The pain is an aching and throbbing like sensation towards the LEFT SIDE. Denies LE paresthesias,  saddle anesthesia, bowel or bladder incontinence. To manage this pain the patient has attempted CELEBREX with 30-60% relief at times.  The patients chronic GERD, allergies, insomnia are stable on medication management.     PAIN SCORE: 9/10 in his LEFT knee    PP: Dr. Shirley, Dr. Lee     Past Medical History  He has a past medical history of Chronic sinusitis, unspecified (09/21/2016), Low back pain, Plantar fascial fibromatosis (09/21/2016), and Primary insomnia (09/21/2016).    Surgical History  He has a past surgical history that includes Other surgical history (05/18/2021); Other surgical history (05/18/2021); and Other surgical history (05/12/2020).     Social History  He reports that he has never smoked. He has never been exposed to tobacco smoke. He has never used smokeless tobacco. He reports that he does not drink alcohol and does not use drugs.    Family History  Family History   Problem Relation Name Age of Onset    Allergies Mother      Other (cardiac disorder) Mother          stent 70s    Hypertension Mother      Hypertension Father      Allergies Sister      Glaucoma Neg Hx      Macular degeneration Neg Hx          Allergies  Iodinated contrast media    Review of Systems    All other systems reviewed and negative for any deficits. Pertinent positives and negatives were considered in the medical decision making process.        Physical Exam  /78   Pulse 68   Temp 36.4 °C (97.5 °F)   Resp 16    Reviewed on 10/22/24    Continue to encourage cessation upon discharge    No associated orders from this encounter found during lookback period of 72 hours.   SpO2 96%     RECALL FROM PREVIOUS VISIT:     General: Pt appears stated age    Eyes: Conjunctiva non-icteric and lids without obvious rash or drooping. Pupils are symmetric.     Conjunctiva injected    ENT: External ears and nose appear to be without deformity or rash. No lesions or masses noted. Hearing is grossly intact    Neck: No JVD noted, tracheal position midline. No goiter noted on assessment of thyroid    Respiratory: No gasping or shortness of breath noted, no use of accessory muscles noted    Cardiovascular: Extremities show no edema or varicosities    Skin: No rashes or open lesions/ulcers identified on skin. No induration/tightening noted with palpation of skin    Musculoskeletal: Gait is grossly normal    Digits/nails show no clubbing or cyanosis    Exam of muscles/joints/bones shows no gross atrophy and no abnormal/involuntary movements in the head/neckNo asymmetry or masses noted in the head/neck    Stability: no subluxation noted on movement of bilateral upper extremities or head/neck    Strength: 5/5 in RLE and 4/5 LLE     Range of Motion: WNL     Sensation: DEC HIP FLEXION IN RLE     Cranial nerves 2-12 are grossly intact    Psychiatric: Pt is alert and oriented to time, place and person.     ---crepitance noted in his left knee       Assessment/Plan   1. Chronic pain of left knee  Joint Injection/Aspiration           1. I have previously provided the patient with a list of physical therapy exercises to learn and perform to strengthen core, maintain stabilization, and reduce pain. We reviewed the exercises in detail and I encouraged them to perform them on a regular basis.    Would recommend elliptical  therapy.     2. I would recommend the pt continue Celebrex to help with nerve related pain. We discussed the risks, benefits, and side effects to this medication including the mechanism of action and the pt understands.     3. I previously reviewed the patients MRI findings in detail,  including review of the actual images and provided a detailed explanation of the findings using a spine model.     There is noted facet degenerative changes at the L4/5 and L5/S1 level with mild canal stenosis.     There is a disc herniation at the L4/5 and more prominently at the L5/S1 level.     4.  The patient is a candidate for an LESI at the L5/S1 vs  LEFT LTR at L4 and L5 versus  LESI L5/S1 to treat low back and radicular pain. I spent time with the patient discussing all of the risks, benefits, and alternatives to this measure. Including but not limited to worsening pain with injection, no improvement with pain, numbness in the lower extremity, vagal reaction, hypotension, feeling lightheaded/dizzy, spinal infection, epidural hematoma/abscess, paralysis, nerve injury, steroid effects, and spinal headache.The patient understands ALL of these risks and agrees to proceed with the planned procedure.    Will schedule with Valium 10mg PO.     His lumbar injection was on 4- and 6-6-2024 he reported ~50% relief of his pain that lasted for approx ~1 months time and has gradually returned.     5. I would recommend the pt start on Gabapentin 300mg at bedtime daily to help with nerve related pain. We discussed the risks, benefits, and side effects to this medication including the mechanism of action and the pt understands and agrees.    6. I extensively reviewed the patients x-ray 9- in detail, including review of the actual images and provided a detailed explanation of the findings using a model. The pt has evidence of mild to moderate degenerative changes of left knee.    7. The patient is a candidate for a LEFT KNEE JOINT STEROID INJECTION to TREAT KNEE PAIN. I spent time with the patient discussing the risks, benefits, and alternatives to this measure including, but not limited to worsening pain with injection, no improvement/guarantee with the procedure, numbness in the lower extremity and risk of  falls post procedure, vagal reaction/ hypotension, feeling dizzy / lightheaded, infection, worsening pre-existing infections, epidural hematoma, epidural abscess, nerve injury, paralysis, spinal fluid leak and spinal headache. The patient understands all of these risks and agrees to proceed with the planned procedure.     The pt last underwent a left knee joint injection in the office on 8- and he reported greater than 50% relief of his pain that lasted for several months time. He reports his knee pain has slowly returned.       Ar Jean MD    I spent time with the patient reviewing their imaging and discussing the risks benefits and alternatives to the above plan. A total of 30 minutes was spent reviewing the data and greater than 50% of that time was with the patient during the face to face encounter discussing treatment options both surgical, non-surgical, and minimally invasive techniques.       DATE OF PROCEDURE: 4-     PRE-PROCEDURE DIAGNOSIS: LEFT knee osteoarthritis    POST-PROCEDURE DIAGNOSIS: Same as above    PROCEDURE:    LEFT knee joint steroid injection    ANESTHESIA: Local    COMPLICATIONS: None    ATTENDING PHYSICIAN: Ar Jean M.D.    CLINICAL DATA: The patient is a 69 y.o. year old male presenting with a history of pain to the LEFT KNEE. The patient also has X-ray evidence of bilateral knee osteoarthritis. His last injection was 5-, 8-.     PROCEDURE: The patient was identified in the preop area. After risks and benefits were discussed, informed consent was obtained. The patient was brought back to the procedure room and placed in the supine position. The patient's LEFT knee were prepped and draped in usual sterile fashion. The skin overlying the trajectory to the knee joint was anesthetized with a total of 5ml of 0.5% lidocaine via a one inch long 25 G needle. Next the joint was accessed with a 22G spinal needle using the superior lateral approach. After  negative aspiration for heme a total of 4ml of 0.5% Ropivacaine and 40mg of kenalog was injected. The needle was removed and a sterile dressing was applied. The patient tolerated the procedure well and was discharged home in good condition.    PLAN: The pt will follow up in the office in one to two months to report their results with the procedure. Discharge instructions were reviewed in recovery and provided to the patient in writing. They were advised to call should they have any questions or concerns.

## 2025-04-21 ENCOUNTER — APPOINTMENT (OUTPATIENT)
Dept: OPHTHALMOLOGY | Facility: CLINIC | Age: 70
End: 2025-04-21
Payer: COMMERCIAL

## 2025-04-21 ENCOUNTER — PHARMACY VISIT (OUTPATIENT)
Dept: PHARMACY | Facility: CLINIC | Age: 70
End: 2025-04-21
Payer: COMMERCIAL

## 2025-04-21 DIAGNOSIS — K21.9 GASTROESOPHAGEAL REFLUX DISEASE, UNSPECIFIED WHETHER ESOPHAGITIS PRESENT: ICD-10-CM

## 2025-04-21 PROCEDURE — RXMED WILLOW AMBULATORY MEDICATION CHARGE

## 2025-04-21 RX ORDER — PANTOPRAZOLE SODIUM 40 MG/1
40 TABLET, DELAYED RELEASE ORAL EVERY 24 HOURS
Qty: 90 TABLET | Refills: 1 | Status: SHIPPED | OUTPATIENT
Start: 2025-04-21

## 2025-04-25 PROCEDURE — RXMED WILLOW AMBULATORY MEDICATION CHARGE

## 2025-05-05 ENCOUNTER — PHARMACY VISIT (OUTPATIENT)
Dept: PHARMACY | Facility: CLINIC | Age: 70
End: 2025-05-05
Payer: COMMERCIAL

## 2025-05-16 ENCOUNTER — HOSPITAL ENCOUNTER (OUTPATIENT)
Dept: PAIN MEDICINE | Facility: CLINIC | Age: 70
Discharge: HOME | End: 2025-05-16
Payer: COMMERCIAL

## 2025-05-16 VITALS
OXYGEN SATURATION: 95 % | SYSTOLIC BLOOD PRESSURE: 141 MMHG | TEMPERATURE: 97.5 F | RESPIRATION RATE: 18 BRPM | HEART RATE: 66 BPM | DIASTOLIC BLOOD PRESSURE: 80 MMHG

## 2025-05-16 DIAGNOSIS — M54.16 LUMBAR NEURITIS: ICD-10-CM

## 2025-05-16 PROCEDURE — 2500000005 HC RX 250 GENERAL PHARMACY W/O HCPCS: Mod: JZ | Performed by: ANESTHESIOLOGY

## 2025-05-16 PROCEDURE — 2500000004 HC RX 250 GENERAL PHARMACY W/ HCPCS (ALT 636 FOR OP/ED): Mod: JZ | Performed by: ANESTHESIOLOGY

## 2025-05-16 PROCEDURE — 2500000002 HC RX 250 W HCPCS SELF ADMINISTERED DRUGS (ALT 637 FOR MEDICARE OP, ALT 636 FOR OP/ED): Performed by: ANESTHESIOLOGY

## 2025-05-16 PROCEDURE — 62323 NJX INTERLAMINAR LMBR/SAC: CPT | Performed by: ANESTHESIOLOGY

## 2025-05-16 RX ORDER — DIAZEPAM 5 MG/1
10 TABLET ORAL ONCE
Status: SHIPPED | OUTPATIENT
Start: 2025-05-16

## 2025-05-16 RX ORDER — LIDOCAINE HYDROCHLORIDE 5 MG/ML
INJECTION, SOLUTION INFILTRATION; INTRAVENOUS AS NEEDED
Status: COMPLETED | OUTPATIENT
Start: 2025-05-16 | End: 2025-05-16

## 2025-05-16 RX ORDER — TRIAMCINOLONE ACETONIDE 40 MG/ML
INJECTION, SUSPENSION INTRA-ARTICULAR; INTRAMUSCULAR AS NEEDED
Status: COMPLETED | OUTPATIENT
Start: 2025-05-16 | End: 2025-05-16

## 2025-05-16 RX ORDER — SODIUM CHLORIDE 9 MG/ML
INJECTION, SOLUTION INTRAMUSCULAR; INTRAVENOUS; SUBCUTANEOUS AS NEEDED
Status: COMPLETED | OUTPATIENT
Start: 2025-05-16 | End: 2025-05-16

## 2025-05-16 RX ADMIN — SODIUM CHLORIDE 10 ML: 9 INJECTION, SOLUTION INTRAMUSCULAR; INTRAVENOUS; SUBCUTANEOUS at 13:24

## 2025-05-16 RX ADMIN — DIAZEPAM 5 MG: 5 TABLET ORAL at 13:00

## 2025-05-16 RX ADMIN — TRIAMCINOLONE ACETONIDE 40 MG: 40 INJECTION, SUSPENSION INTRA-ARTICULAR; INTRAMUSCULAR at 13:24

## 2025-05-16 RX ADMIN — LIDOCAINE HYDROCHLORIDE 10 ML: 5 INJECTION, SOLUTION INFILTRATION at 13:24

## 2025-05-16 ASSESSMENT — COLUMBIA-SUICIDE SEVERITY RATING SCALE - C-SSRS
2. HAVE YOU ACTUALLY HAD ANY THOUGHTS OF KILLING YOURSELF?: NO
1. IN THE PAST MONTH, HAVE YOU WISHED YOU WERE DEAD OR WISHED YOU COULD GO TO SLEEP AND NOT WAKE UP?: NO
6. HAVE YOU EVER DONE ANYTHING, STARTED TO DO ANYTHING, OR PREPARED TO DO ANYTHING TO END YOUR LIFE?: NO

## 2025-05-16 ASSESSMENT — PAIN SCALES - GENERAL
PAINLEVEL_OUTOF10: 7
PAINLEVEL_OUTOF10: 0 - NO PAIN

## 2025-05-16 ASSESSMENT — PAIN DESCRIPTION - DESCRIPTORS: DESCRIPTORS: BURNING

## 2025-05-16 ASSESSMENT — PAIN - FUNCTIONAL ASSESSMENT
PAIN_FUNCTIONAL_ASSESSMENT: 0-10
PAIN_FUNCTIONAL_ASSESSMENT: 0-10

## 2025-05-20 ENCOUNTER — PHARMACY VISIT (OUTPATIENT)
Dept: PHARMACY | Facility: CLINIC | Age: 70
End: 2025-05-20
Payer: COMMERCIAL

## 2025-05-20 PROCEDURE — RXMED WILLOW AMBULATORY MEDICATION CHARGE

## 2025-05-21 PROCEDURE — RXMED WILLOW AMBULATORY MEDICATION CHARGE

## 2025-05-22 ENCOUNTER — PHARMACY VISIT (OUTPATIENT)
Dept: PHARMACY | Facility: CLINIC | Age: 70
End: 2025-05-22
Payer: COMMERCIAL

## 2025-05-29 ENCOUNTER — OFFICE VISIT (OUTPATIENT)
Dept: ORTHOPEDIC SURGERY | Facility: CLINIC | Age: 70
End: 2025-05-29
Payer: COMMERCIAL

## 2025-05-29 ENCOUNTER — HOSPITAL ENCOUNTER (OUTPATIENT)
Dept: RADIOLOGY | Facility: CLINIC | Age: 70
Discharge: HOME | End: 2025-05-29
Payer: COMMERCIAL

## 2025-05-29 DIAGNOSIS — M17.12 ARTHRITIS OF KNEE, LEFT: ICD-10-CM

## 2025-05-29 PROCEDURE — 99213 OFFICE O/P EST LOW 20 MIN: CPT | Performed by: ORTHOPAEDIC SURGERY

## 2025-05-29 PROCEDURE — 1159F MED LIST DOCD IN RCRD: CPT | Performed by: ORTHOPAEDIC SURGERY

## 2025-05-29 PROCEDURE — 73564 X-RAY EXAM KNEE 4 OR MORE: CPT | Mod: LT

## 2025-05-29 PROCEDURE — 73564 X-RAY EXAM KNEE 4 OR MORE: CPT | Mod: LEFT SIDE | Performed by: STUDENT IN AN ORGANIZED HEALTH CARE EDUCATION/TRAINING PROGRAM

## 2025-06-02 ENCOUNTER — APPOINTMENT (OUTPATIENT)
Dept: OPHTHALMOLOGY | Facility: CLINIC | Age: 70
End: 2025-06-02
Payer: COMMERCIAL

## 2025-06-04 ENCOUNTER — APPOINTMENT (OUTPATIENT)
Dept: ORTHOPEDIC SURGERY | Facility: CLINIC | Age: 70
End: 2025-06-04
Payer: COMMERCIAL

## 2025-06-04 DIAGNOSIS — M17.12 ARTHRITIS OF KNEE, LEFT: Primary | ICD-10-CM

## 2025-06-04 PROCEDURE — 99204 OFFICE O/P NEW MOD 45 MIN: CPT | Performed by: ORTHOPAEDIC SURGERY

## 2025-06-04 NOTE — PROGRESS NOTES
History of Present Illness:  Patient presents with left knee pain.  The patient localizes the pain diffusely.  Recently there has been concern for falls and instability.  There is increasing difficulty with activities of daily living and significant disability related to the knee pain.  The patient endorses the following failed non-operative treatments: Rest, ice, anti-inflammatories as well as previous rounds of steroid injections and physical therapy that have helped minimally, his most recent steroid injection only helped for about a couple weeks.   There is increasing frustration with persistent pain and swelling and decreasing distance of ambulation.  Pain is moderate, achy, diffuse.  Better with rest, worse with activity.     He is status post right total knee arthroplasty and states that he had significant difficulty with range of motion afterwards.  He is expressing some concern regarding left-sided knee pain as he understands he may be trending towards larger surgical options and is worried about range of motion and function afterwards.    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    Review of Systems   GENERAL: Negative for malaise, significant weight loss, fever  MUSCULOSKELETAL: see HPI  NEURO:  Negative    Medical History[1]  Surgical History[2]  Current Medications[3]      Physical Examination:  Left Knee:  Skin healthy and intact  No gross swelling or ecchymosis  Alignment: varus      Effusion: moderate       ROM: Normal  (Right knee ROM is 5-85)  Crepitance with range of motion  No pain with internal rotation of the hip  Tenderness to palpation over medial joint line and medial femur, no tenderness to patellar compression    No laxity to valgus stress  No laxity to varus stress  Negative Lachman´s test  Negative posterior drawer test  Mild pain with Lucille´s test  Neurovascular exam normal distally  2+ DP pulse and good cap  refill    Radiographs:  Severe degenerative joint disease with loss of joint space, subchondral sclerosis and cystic changes, and osteophyte formation predominantly medial DJD, mild patellofemoral DJD     Assessment:  Patient with left knee osteoarthritis    Plan:  We discussed the diagnosis of degenerative joint disease of the knee.  We reviewed an evidence-based approach to osteoarthritis of the knee.  We strongly encouraged low-impact aerobic activity and non-opioid analgesics.     We discussed temporary pain relief with corticosteroid injections and the associated risks.  We also discussed the conflicting evidence regarding viscosupplementation and potential long-term risks with NSAID´s.  We reviewed the role of bracing for instability and physical therapy for atrophy and gait abnormalities.  The patient elected for further discussion and planning regarding unicompartmental medial arthroplasty.  We feel that based off the patient's x-rays and physical examination today as well as history and pain presentation we feel all of his pain is medial as well as predominant x-ray findings, he would benefit from this procedure, small possibility of conversion to total knee arthroplasty down the road.    Luis Pringle PA-C      In a face to face encounter, I evaluated the patient and performed a physical examination, discussed pertinent diagnostic studies if indicated and discussed diagnosis and management strategies with both the patient and physician assistant / nurse practitioner.  I reviewed the PA/NP's note and agree with the documented findings and plan of care.    Patient with significant stiffness after right total knee arthroplasty by Dr. Almaraz with flexion limited to approximately 80 degrees.  Left knee with good range of motion severe medial compartment arthrosis mild arthritis of the anterior compartment.  He has minimal anterior pain and do feel based on the stiffness and lack of function he is a good  candidate for unicompartmental arthroplasty.    Kris Whalen MD           [1]   Past Medical History:  Diagnosis Date    Chronic sinusitis, unspecified 09/21/2016    Clinical sinusitis    Low back pain     Plantar fascial fibromatosis 09/21/2016    Plantar fasciitis    Primary insomnia 09/21/2016    Primary insomnia   [2]   Past Surgical History:  Procedure Laterality Date    OTHER SURGICAL HISTORY  05/18/2021    Nasal septoplasty    OTHER SURGICAL HISTORY  05/18/2021    Tonsillectomy with adenoidectomy    OTHER SURGICAL HISTORY  05/12/2020    Hernia repair   [3]   Current Outpatient Medications:     alpha lipoic acid 50 mg capsule, Take by mouth., Disp: , Rfl:     azelastine (Astelin) 137 mcg (0.1 %) nasal spray, Administer 1 spray into each nostril 2 times a day. Use in each nostril as directed, Disp: 30 mL, Rfl: 5    celecoxib (CeleBREX) 200 mg capsule, Take 1 capsule (200 mg) by mouth 2 times a day as needed for mild pain (1 - 3), Disp: 180 capsule, Rfl: 1    diazePAM (Valium) 5 mg tablet, Take 1 tablet (5 mg) by mouth 3 times a day as needed., Disp: 20 tablet, Rfl: 0    fluticasone (Flonase Allergy Relief) 50 mcg/actuation nasal spray, Administer 2 sprays into each nostril once daily., Disp: 16 g, Rfl: 3    fluticasone (Flonase) 50 mcg/actuation nasal spray, Administer 2 sprays into each nostril once daily, Disp: 16 g, Rfl: 0    fluticasone (Flonase) 50 mcg/actuation nasal spray, Administer 2 sprays into each nostril once daily., Disp: 48 g, Rfl: 1    methylPREDNISolone (Medrol, Alistair,) 4 mg tablets, Use as directed on package, Disp: 21 tablet, Rfl: 0    ofloxacin (Ocuflox) 0.3 % ophthalmic solution, Administer 1 drop into the right eye 4 times a day for 10 days., Disp: 5 mL, Rfl: 2    olopatadine (Pataday) 0.2 % ophthalmic solution, Administer 1 drop into both eyes once daily., Disp: 2.5 mL, Rfl: 5    pantoprazole (ProtoNix) 40 mg EC tablet, Take 1 tablet (40 mg) by mouth once every 24 hours., Disp: 90 tablet,  Rfl: 1    prednisoLONE acetate (Pred-Forte) 1 % ophthalmic suspension, Administer 1 drop into the right eye 2 times a day., Disp: 5 mL, Rfl: 2    zolpidem (Ambien) 10 mg tablet, TAKE 1 TABLET (10 MG) BY MOUTH AS NEEDED AT BEDTIME FOR SLEEP., Disp: 90 tablet, Rfl: 1    Current Facility-Administered Medications:     diazePAM (Valium) tablet 10 mg, 10 mg, oral, Once, Ar Jean MD

## 2025-06-05 DIAGNOSIS — G47.00 INSOMNIA, UNSPECIFIED TYPE: ICD-10-CM

## 2025-06-05 RX ORDER — ZOLPIDEM TARTRATE 10 MG/1
10 TABLET ORAL NIGHTLY PRN
Qty: 90 TABLET | Refills: 1 | Status: CANCELLED | OUTPATIENT
Start: 2025-06-05 | End: 2025-12-02

## 2025-06-06 ENCOUNTER — APPOINTMENT (OUTPATIENT)
Dept: PRIMARY CARE | Facility: CLINIC | Age: 70
End: 2025-06-06
Payer: COMMERCIAL

## 2025-06-06 VITALS
DIASTOLIC BLOOD PRESSURE: 82 MMHG | WEIGHT: 235 LBS | HEIGHT: 73 IN | SYSTOLIC BLOOD PRESSURE: 126 MMHG | BODY MASS INDEX: 31.14 KG/M2

## 2025-06-06 DIAGNOSIS — G47.00 INSOMNIA, UNSPECIFIED TYPE: ICD-10-CM

## 2025-06-06 DIAGNOSIS — M17.12 ARTHRITIS OF KNEE, LEFT: ICD-10-CM

## 2025-06-06 DIAGNOSIS — Z79.899 LONG TERM USE OF DRUG: ICD-10-CM

## 2025-06-06 DIAGNOSIS — Z00.00 HEALTHCARE MAINTENANCE: Primary | ICD-10-CM

## 2025-06-06 DIAGNOSIS — R73.03 PREDIABETES: ICD-10-CM

## 2025-06-06 DIAGNOSIS — Z12.5 SCREENING FOR PROSTATE CANCER: ICD-10-CM

## 2025-06-06 PROCEDURE — RXMED WILLOW AMBULATORY MEDICATION CHARGE

## 2025-06-06 PROCEDURE — 3079F DIAST BP 80-89 MM HG: CPT | Performed by: STUDENT IN AN ORGANIZED HEALTH CARE EDUCATION/TRAINING PROGRAM

## 2025-06-06 PROCEDURE — 3074F SYST BP LT 130 MM HG: CPT | Performed by: STUDENT IN AN ORGANIZED HEALTH CARE EDUCATION/TRAINING PROGRAM

## 2025-06-06 PROCEDURE — 99213 OFFICE O/P EST LOW 20 MIN: CPT | Performed by: STUDENT IN AN ORGANIZED HEALTH CARE EDUCATION/TRAINING PROGRAM

## 2025-06-06 PROCEDURE — 3008F BODY MASS INDEX DOCD: CPT | Performed by: STUDENT IN AN ORGANIZED HEALTH CARE EDUCATION/TRAINING PROGRAM

## 2025-06-06 PROCEDURE — 1160F RVW MEDS BY RX/DR IN RCRD: CPT | Performed by: STUDENT IN AN ORGANIZED HEALTH CARE EDUCATION/TRAINING PROGRAM

## 2025-06-06 PROCEDURE — 1036F TOBACCO NON-USER: CPT | Performed by: STUDENT IN AN ORGANIZED HEALTH CARE EDUCATION/TRAINING PROGRAM

## 2025-06-06 PROCEDURE — 1159F MED LIST DOCD IN RCRD: CPT | Performed by: STUDENT IN AN ORGANIZED HEALTH CARE EDUCATION/TRAINING PROGRAM

## 2025-06-06 RX ORDER — CELECOXIB 200 MG/1
200 CAPSULE ORAL 2 TIMES DAILY PRN
Qty: 180 CAPSULE | Refills: 1 | Status: SHIPPED | OUTPATIENT
Start: 2025-06-06

## 2025-06-06 RX ORDER — ZOLPIDEM TARTRATE 10 MG/1
10 TABLET ORAL NIGHTLY PRN
Qty: 90 TABLET | Refills: 1 | Status: SHIPPED | OUTPATIENT
Start: 2025-06-06 | End: 2025-12-03

## 2025-06-06 RX ORDER — FLUTICASONE PROPIONATE 50 MCG
2 SPRAY, SUSPENSION (ML) NASAL DAILY
Qty: 16 G | Refills: 3 | Status: SHIPPED | OUTPATIENT
Start: 2025-06-06

## 2025-06-06 NOTE — PROGRESS NOTES
"Subjective   Patient ID: Robbie Tovar is a 70 y.o. male who presents for Follow-up.    HPI     Presents for follow-up and medication refill. Scheduled July 18th with Dr. Kris Whalen for left knee unicompartmental arthroplasty. Anticipates 3 months of rehab afterwards. No issues with anesthesia previously. Denies any chest pain, shortness of breath, palpitations.     Review of Systems    8 point review of systems is otherwise negative unless mentioned on HPI      Objective   Ht 1.854 m (6' 1\")   Wt 107 kg (235 lb)   BMI 31.00 kg/m²     Physical Exam    General: No acute distress  HEENT: EOMI  CV: Regular rate and rhythm, normal S1 and S2, no murmurs  Pulm: Clear to auscultation bilaterally, no wheezings, rales or rhonchi  Abd: Nondistended  MSK: 5/5 strength in all extremities  Skin: No rashes   Lymphatic: No lymphadenopathy      Assessment/Plan       #Left knee pain  -Scheduled July 18th with Dr. Kris Whalen for left knee unicompartmental arthroplasty  -RCRI score of 0. Will be scheduled for routine PAT. No additional testing recommended    #HTN/leg swelling  -Continue hydrochlorothiazide     #History of cerumen impaction, has undergone lavage in the past     #Insomnia: Continue Ambien. Patient says he has been stable for many years. Controlled substance contract 5/2024 and UDS 5/2024. I personally reviewed [place]. UDS 6/6/2025     #obesity: Encouraged continued efforts at LM, weight loss. Offered support.     #GERD: Takes PPI.     #IBS-D: uses bentyl PRN     #Pre-diabetes: weight loss, diet modifications,      #BCC: referral to dermatology for further management and interested in skin tag management.      #Health maintenance: He gets yearly flu shot. He declines TDaP. Pneumovax given 2022. Colonoscopy has been ordered previously. Advised Shingrix. Received COVID-19 vaccines and yearly flu shot.     RTC 6 months     This note was dictated by speech recognition. Minor errors in transcription may be present  "

## 2025-06-08 LAB
1OH-MIDAZOLAM UR-MCNC: NEGATIVE NG/ML
7AMINOCLONAZEPAM UR-MCNC: NEGATIVE NG/ML
A-OH ALPRAZ UR-MCNC: NEGATIVE NG/ML
A-OH-TRIAZOLAM UR-MCNC: NEGATIVE NG/ML
AMPHETAMINES UR QL: NEGATIVE NG/ML
BARBITURATES UR QL: NEGATIVE NG/ML
BZE UR QL: NEGATIVE NG/ML
CODEINE UR-MCNC: NORMAL NG/ML
CREAT UR-MCNC: 109.4 MG/DL
DRUG SCREEN COMMENT UR-IMP: NORMAL
EDDP UR-MCNC: NORMAL NG/ML
FENTANYL UR-MCNC: NORMAL NG/ML
HYDROCODONE UR-MCNC: NORMAL UG/ML
HYDROMORPHONE UR-MCNC: NORMAL NG/ML
LORAZEPAM UR-MCNC: NEGATIVE NG/ML
METHADONE UR-MCNC: NORMAL UG/L
MORPHINE UR-MCNC: NORMAL NG/ML
NORDIAZEPAM UR-MCNC: NEGATIVE NG/ML
NORFENTANYL UR-MCNC: NORMAL NG/ML
NORHYDROCODONE UR CFM-MCNC: NORMAL NG/ML
NOROXYCODONE UR CFM-MCNC: NORMAL NG/ML
NORTRAMADOL UR-MCNC: NORMAL UG/ML
OH-ETHYLFLURAZ UR-MCNC: NEGATIVE NG/ML
OXAZEPAM UR-MCNC: NEGATIVE NG/ML
OXIDANTS UR QL: NEGATIVE MCG/ML
OXYCODONE UR CFM-MCNC: NORMAL NG/ML
OXYMORPHONE UR CFM-MCNC: NORMAL NG/ML
PCP UR QL: NEGATIVE NG/ML
PH UR: 6.2 [PH] (ref 4.5–9)
QUEST 6 ACETYLMORPHINE: NORMAL
QUEST NOTES AND COMMENTS: NORMAL
QUEST PATIENT HISTORICAL REPORT: NORMAL
QUEST ZOLPIDEM: NORMAL
TEMAZEPAM UR-MCNC: NEGATIVE NG/ML
THC UR QL: NEGATIVE NG/ML
TRAMADOL UR-MCNC: NORMAL UG/ML
ZOLPIDEM PHENYL-4-CARB UR CFM-MCNC: NORMAL NG/ML

## 2025-06-09 ENCOUNTER — PHARMACY VISIT (OUTPATIENT)
Dept: PHARMACY | Facility: CLINIC | Age: 70
End: 2025-06-09
Payer: COMMERCIAL

## 2025-06-11 PROCEDURE — RXMED WILLOW AMBULATORY MEDICATION CHARGE

## 2025-06-12 ENCOUNTER — PHARMACY VISIT (OUTPATIENT)
Dept: PHARMACY | Facility: CLINIC | Age: 70
End: 2025-06-12
Payer: COMMERCIAL

## 2025-06-12 LAB
1OH-MIDAZOLAM UR-MCNC: NEGATIVE NG/ML
7AMINOCLONAZEPAM UR-MCNC: NEGATIVE NG/ML
A-OH ALPRAZ UR-MCNC: NEGATIVE NG/ML
A-OH-TRIAZOLAM UR-MCNC: NEGATIVE NG/ML
AMPHETAMINES UR QL: NEGATIVE NG/ML
BARBITURATES UR QL: NEGATIVE NG/ML
BZE UR QL: NEGATIVE NG/ML
CODEINE UR-MCNC: NEGATIVE NG/ML
CREAT UR-MCNC: 109.4 MG/DL
DRUG SCREEN COMMENT UR-IMP: ABNORMAL
EDDP UR-MCNC: NEGATIVE NG/ML
FENTANYL UR-MCNC: NEGATIVE NG/ML
HYDROCODONE UR-MCNC: NEGATIVE NG/ML
HYDROMORPHONE UR-MCNC: NEGATIVE NG/ML
LORAZEPAM UR-MCNC: NEGATIVE NG/ML
METHADONE UR-MCNC: NEGATIVE NG/ML
MORPHINE UR-MCNC: NEGATIVE NG/ML
NORDIAZEPAM UR-MCNC: NEGATIVE NG/ML
NORFENTANYL UR-MCNC: NEGATIVE NG/ML
NORHYDROCODONE UR CFM-MCNC: NEGATIVE NG/ML
NOROXYCODONE UR CFM-MCNC: NEGATIVE NG/ML
NORTRAMADOL UR-MCNC: NEGATIVE NG/ML
OH-ETHYLFLURAZ UR-MCNC: NEGATIVE NG/ML
OXAZEPAM UR-MCNC: NEGATIVE NG/ML
OXIDANTS UR QL: NEGATIVE MCG/ML
OXYCODONE UR CFM-MCNC: NEGATIVE NG/ML
OXYMORPHONE UR CFM-MCNC: NEGATIVE NG/ML
PCP UR QL: NEGATIVE NG/ML
PH UR: 6.2 [PH] (ref 4.5–9)
QUEST 6 ACETYLMORPHINE: NEGATIVE NG/ML
QUEST NOTES AND COMMENTS: ABNORMAL
QUEST ZOLPIDEM: 9 NG/ML
TEMAZEPAM UR-MCNC: NEGATIVE NG/ML
THC UR QL: NEGATIVE NG/ML
TRAMADOL UR-MCNC: NEGATIVE NG/ML
ZOLPIDEM PHENYL-4-CARB UR CFM-MCNC: 593 NG/ML

## 2025-06-18 NOTE — PROGRESS NOTES
Patient is a 70-year-old gentleman who presents today for evaluation of left knee pain.  He has known underlying osteoarthritis.  Has had previous injections, takes Celebrex as well as Tylenol.  He rates his pain at times is 10 out of 10.  He is difficulty walking started distance going downstairs.    Left knee:  AAOx3, NAD, walks with a moderate antalgic gait  Varus allignment  Range of motion lacks 10 degrees of full extension and flexes to 110 degrees  Stable to varus/valgus/anterior/posterior stress through out the range of motion  Slight laxity with varus stress  Diffuse medial  joint line tenderness to palpation  Moderate effusion  SILT in a liam/saph/per/tib distribution  5/5 knee extension/df/pf/ehl  ½ dorsalis pedis and posterior tibial pulse  no popliteal lymphadenopathy  no other overlying lesions  mood: euthymic  Respirations non labored    Plain films were reviewed by myself in clinic today.  He has tricompartmental osteoarthritis of his right knee with complete loss of his medial joint space and osteophytic change within the patellofemoral joint.    I discussed him today continue conservative treatment versus proceeding with total knee replacement.  Ultimately be up to him and how he would like to proceed.  He was given a office card and number can call to schedule surgery at his convenience.  Would see him back at a time for a full surgical consult.  All of his questions were answered.    This note was created using voice recognition software and was not corrected for typographical or grammatical errors.

## 2025-06-20 ENCOUNTER — TELEPHONE (OUTPATIENT)
Dept: ORTHOPEDIC SURGERY | Facility: CLINIC | Age: 70
End: 2025-06-20
Payer: COMMERCIAL

## 2025-06-23 ENCOUNTER — APPOINTMENT (OUTPATIENT)
Dept: ORTHOPEDIC SURGERY | Facility: CLINIC | Age: 70
End: 2025-06-23
Payer: COMMERCIAL

## 2025-07-03 ENCOUNTER — LAB (OUTPATIENT)
Dept: LAB | Facility: HOSPITAL | Age: 70
End: 2025-07-03
Payer: COMMERCIAL

## 2025-07-03 ENCOUNTER — HOSPITAL ENCOUNTER (OUTPATIENT)
Dept: RADIOLOGY | Facility: HOSPITAL | Age: 70
Discharge: HOME | End: 2025-07-03
Payer: COMMERCIAL

## 2025-07-03 ENCOUNTER — PRE-ADMISSION TESTING (OUTPATIENT)
Dept: PREADMISSION TESTING | Facility: HOSPITAL | Age: 70
End: 2025-07-03
Payer: COMMERCIAL

## 2025-07-03 ENCOUNTER — TELEPHONE (OUTPATIENT)
Dept: ORTHOPEDIC SURGERY | Facility: CLINIC | Age: 70
End: 2025-07-03
Payer: COMMERCIAL

## 2025-07-03 VITALS
RESPIRATION RATE: 14 BRPM | BODY MASS INDEX: 31.26 KG/M2 | HEART RATE: 74 BPM | SYSTOLIC BLOOD PRESSURE: 166 MMHG | DIASTOLIC BLOOD PRESSURE: 84 MMHG | WEIGHT: 235.89 LBS | HEIGHT: 73 IN | TEMPERATURE: 97.7 F | OXYGEN SATURATION: 96 %

## 2025-07-03 DIAGNOSIS — M17.12 ARTHRITIS OF KNEE, LEFT: ICD-10-CM

## 2025-07-03 DIAGNOSIS — Z01.818 PRE-OP TESTING: Primary | ICD-10-CM

## 2025-07-03 DIAGNOSIS — M17.12 UNILATERAL PRIMARY OSTEOARTHRITIS, LEFT KNEE: ICD-10-CM

## 2025-07-03 PROCEDURE — 93005 ELECTROCARDIOGRAM TRACING: CPT

## 2025-07-03 PROCEDURE — 85610 PROTHROMBIN TIME: CPT

## 2025-07-03 PROCEDURE — 93010 ELECTROCARDIOGRAM REPORT: CPT | Performed by: INTERNAL MEDICINE

## 2025-07-03 PROCEDURE — 85025 COMPLETE CBC W/AUTO DIFF WBC: CPT

## 2025-07-03 PROCEDURE — 85730 THROMBOPLASTIN TIME PARTIAL: CPT

## 2025-07-03 PROCEDURE — 80053 COMPREHEN METABOLIC PANEL: CPT

## 2025-07-03 PROCEDURE — 73700 CT LOWER EXTREMITY W/O DYE: CPT | Mod: LT

## 2025-07-03 PROCEDURE — 87081 CULTURE SCREEN ONLY: CPT | Mod: STJLAB | Performed by: STUDENT IN AN ORGANIZED HEALTH CARE EDUCATION/TRAINING PROGRAM

## 2025-07-03 PROCEDURE — 83036 HEMOGLOBIN GLYCOSYLATED A1C: CPT

## 2025-07-03 RX ORDER — CHLORHEXIDINE GLUCONATE ORAL RINSE 1.2 MG/ML
SOLUTION DENTAL
Qty: 473 ML | Refills: 0 | Status: SHIPPED | OUTPATIENT
Start: 2025-07-03 | End: 2025-07-19 | Stop reason: HOSPADM

## 2025-07-03 ASSESSMENT — DUKE ACTIVITY SCORE INDEX (DASI)
TOTAL_SCORE: 36.7
CAN YOU DO HEAVY WORK AROUND THE HOUSE LIKE SCRUBBING FLOORS OR LIFTING AND MOVING HEAVY FURNITURE: YES
CAN YOU PARTICIPATE IN STRENOUS SPORTS LIKE SWIMMING, SINGLES TENNIS, FOOTBALL, BASKETBALL, OR SKIING: NO
DASI METS SCORE: 7.3
CAN YOU TAKE CARE OF YOURSELF (EAT, DRESS, BATHE, OR USE TOILET): YES
CAN YOU HAVE SEXUAL RELATIONS: YES
CAN YOU PARTICIPATE IN MODERATE RECREATIONAL ACTIVITIES LIKE GOLF, BOWLING, DANCING, DOUBLES TENNIS OR THROWING A BASEBALL OR FOOTBALL: NO
CAN YOU RUN A SHORT DISTANCE: NO
CAN YOU DO YARD WORK LIKE RAKING LEAVES, WEEDING OR PUSHING A MOWER: YES
CAN YOU CLIMB A FLIGHT OF STAIRS OR WALK UP A HILL: YES
CAN YOU DO MODERATE WORK AROUND THE HOUSE LIKE VACUUMING, SWEEPING FLOORS OR CARRYING GROCERIES: YES
CAN YOU DO LIGHT WORK AROUND THE HOUSE LIKE DUSTING OR WASHING DISHES: YES
CAN YOU WALK INDOORS, SUCH AS AROUND YOUR HOUSE: YES
CAN YOU WALK A BLOCK OR TWO ON LEVEL GROUND: YES

## 2025-07-03 ASSESSMENT — PAIN SCALES - GENERAL: PAINLEVEL_OUTOF10: 0 - NO PAIN

## 2025-07-03 ASSESSMENT — ENCOUNTER SYMPTOMS
CARDIOVASCULAR NEGATIVE: 1
NEUROLOGICAL NEGATIVE: 1
NECK NEGATIVE: 1
ENDOCRINE NEGATIVE: 1
CONSTITUTIONAL NEGATIVE: 1
RESPIRATORY NEGATIVE: 1
EYES NEGATIVE: 1

## 2025-07-03 ASSESSMENT — LIFESTYLE VARIABLES: SMOKING_STATUS: NONSMOKER

## 2025-07-03 ASSESSMENT — PAIN - FUNCTIONAL ASSESSMENT: PAIN_FUNCTIONAL_ASSESSMENT: 0-10

## 2025-07-03 ASSESSMENT — ACTIVITIES OF DAILY LIVING (ADL): ADL_SCORE: 0

## 2025-07-03 NOTE — PREPROCEDURE INSTRUCTIONS
Thank you for visiting Preadmission Testing at [facility]. If you have any changes to your health condition, please call the SURGEON's office to alert them and give them details of your symptoms.        Preoperative Brain Exercises    What are brain exercises?  A brain exercise is any activity that engages your thinking (cognitive) skills.    What types of activities are considered brain exercises?  Jigsaw puzzles, crossword puzzles, word jumble, memory games, word search, and many more.  Many can be found free online or on your phone via a mobile susan.    Why should I do brain exercises before my surgery?  More recent research has shown brain exercise before surgery can lower the risk of postoperative delirium (confusion) which can be especially important for older adults.  Patients who did brain exercises for 5 to 10 hours the days before surgery, cut their risk of postoperative delirium in half up to 1 week after surgery.      Preoperative Deep Breathing Exercises    Why it is important to do deep breathing exercises before my surgery?  Deep breathing exercises strengthen your breathing muscles.  This helps you to recover after your surgery and decreases the chance of breathing complications.    How are the deep breathing exercises done?  Sit straight with your back supported.  Breathe in deeply and slowly through your nose. Your lower rib cage should expand and your abdomen may move forward.  Hold that breath for 3 to 5 seconds.  Breathe out through pursed lips, slowly and completely.  Rest and repeat 10 times every hour while awake.  Rest longer if you become dizzy or lightheaded.      Patient and Family Education   Ways You Can Help Prevent Blood Clots     This handout explains some simple things you can do to help prevent blood clots.      Blood clots are blockages that can form in the body's veins. When a blood clot forms in your deep veins, it may be called a deep vein thrombosis, or DVT for short. Blood clots  can happen in any part of the body where blood flows, but they are most common in the arms and legs. If a piece of a blood clot breaks free and travels to the lungs, it is called a pulmonary embolus (PE). A PE can be a very serious problem.      Being in the hospital or having surgery can raise your chances of getting a blood clot because you may not be well enough to move around as much as you normally do.      Ways you can help prevent blood clots in the hospital         Wearing SCDs. SCDs stands for Sequential Compression Devices.   SCDs are special sleeves that wrap around your legs  They attach to a pump that fills them with air to gently squeeze your legs every few minutes.   This helps return the blood in your legs to your heart.   SCDs should only be taken off when walking or bathing.   SCDs may not be comfortable, but they can help save your life.               Wearing compression stockings - if your doctor orders them. These special snug fitting stockings gently squeeze your legs to help blood flow.       Walking. Walking helps move the blood in your legs.   If your doctor says it is ok, try walking the halls at least   5 times a day. Ask us to help you get up, so you don't fall.      Taking any blood thinning medicines your doctor orders.          ©[facility]; 3/23        Ways you can help prevent blood clots at home       Wearing compression stockings - if your doctor orders them. ? Walking - to help move the blood in your legs.       Taking any blood thinning medicines your doctor orders.      Signs of a blood clot or PE      Tell your doctor or nurse know right away if you have of the problems listed below.    If you are at home, seek medical care right away. Call 911 for chest pain or problems breathing.          Signs of a blood clot (DVT) - such as pain,  swelling, redness or warmth in your arm or leg      Signs of a pulmonary embolism (PE) - such as chest     pain or feeling short of  breath

## 2025-07-03 NOTE — PREPROCEDURE INSTRUCTIONS
Medication List            Accurate as of July 3, 2025  2:36 PM. Always use your most recent med list.                alpha lipoic acid 50 mg capsule  Additional Medication Adjustments for Surgery: Take last dose 7 days before surgery     celecoxib 200 mg capsule  Commonly known as: CeleBREX  Take 1 capsule (200 mg) by mouth 2 times a day as needed for mild pain (1 - 3)  Additional Medication Adjustments for Surgery: Take last dose 7 days before surgery     chlorhexidine 0.12 % solution  Commonly known as: Peridex  Sig: 15 mls swish and spit the night before surgery and 15mls swish and spit the morning of surgery do not swallow  Medication Adjustments for Surgery: Take/Use as prescribed     * fluticasone 50 mcg/actuation nasal spray  Commonly known as: Flonase  Administer 2 sprays into each nostril once daily  Medication Adjustments for Surgery: Take/Use as prescribed     * fluticasone 50 mcg/actuation nasal spray  Commonly known as: Flonase  Administer 2 sprays into each nostril once daily.  Medication Adjustments for Surgery: Take/Use as prescribed     * fluticasone 50 mcg/actuation nasal spray  Commonly known as: Flonase Allergy Relief  Administer 2 sprays into each nostril once daily.  Medication Adjustments for Surgery: Take/Use as prescribed     olopatadine 0.2 % ophthalmic solution  Commonly known as: Pataday  Administer 1 drop into both eyes once daily.  Medication Adjustments for Surgery: Take/Use as prescribed     pantoprazole 40 mg EC tablet  Commonly known as: ProtoNix  Take 1 tablet (40 mg) by mouth once every 24 hours.  Medication Adjustments for Surgery: Take/Use as prescribed     zolpidem 10 mg tablet  Commonly known as: Ambien  TAKE 1 TABLET (10 MG) BY MOUTH AS NEEDED AT BEDTIME FOR SLEEP.  Medication Adjustments for Surgery: Take/Use as prescribed           * This list has 3 medication(s) that are the same as other medications prescribed for you. Read the directions carefully, and ask your  doctor or other care provider to review them with you.                                      PRE-OPERATIVE INSTRUCTIONS    You will receive notification one business day prior to your procedure to confirm your arrival time. It is important that you answer your phone and/or check your messages during this time. If you do not hear from the surgery center by 5 pm. the day before your procedure, please call [phone].     Please enter the building through the Outpatient entrance and take the elevator off the lobby to the 2nd floor then check in at the Outpatient Surgery desk on the 2nd floor.    INSTRUCTIONS:  Talk to your surgeon for instructions if you should stop your aspirin, blood thinner, or diabetes medicines.  DO NOT take any multivitamins or over the counter supplements for 7-10 days before surgery.  If not being admitted, you must have an adult immediately available to drive you home after surgery. We also highly recommend you have someone stay with you for the entire day and night of your surgery.  For children having surgery, a parent or legal guardian must accompany them to the surgery center. If this is not possible, please call [phone] to make additional arrangements.  For adults who are unable to consent or make medical decisions for themselves, a legal guardian or Power of  must accompany them to the surgery center. If this is not possible, please call [phone] to make additional arrangements.  Wear comfortable, loose fitting clothing.  All jewelry and piercings must be removed. If you are unable to remove an item or have a dermal piercing, please be sure to tell the nurse when you arrive for surgery.  Nail polish and make-up must be removed.  Avoid smoking or consuming alcohol for 24 hours before surgery.  To help prevent infection, please take a shower/bath and wash your hair the night before and/or morning of surgery (or follow other specific bathing instructions  provided).    Preoperative Fasting Guidelines    Why must I stop eating and drinking near surgery time?  With sedation, food or liquid in your stomach can enter your lungs causing serious complications  Increases nausea and vomiting    When do I need to stop eating and drinking before my surgery?  Do not eat any solid food after midnight the night before your surgery/procedure unless otherwise instructed by your surgeon.   If you take a GLP-1 medication (ex: Trulicity, Ozempic, Mounjaro, Zepbound, Bydyreon, Tanzeun, Saxenda, Victoza, Adlyxin, Rybelus) please follow other specific instructions provided regarding preoperative          fasting.  You may have up to 13.5 ounces of clear liquid until TWO hours before your instructed arrival time to the hospital.  This includes water, black tea/coffee, (no milk or cream) apple juice, and electrolyte drinks (Gatorade).   You may chew gum until TWO hours before your surgery/procedure         If applicable, notify your surgeons office immediately of any new skin changes that occur to the surgical limb.      If you have any questions or concerns, please call Pre-Admission Testing at [phone].

## 2025-07-03 NOTE — TELEPHONE ENCOUNTER
PT cancelled walker appt. Called to see if another appt. Was needed. Sister of PT loaned a walker for personal use. PT understands to bring the walker on the day of SX

## 2025-07-03 NOTE — CPM/PAT H&P
CPM/PAT Evaluation       Name: Robbie Tovar (Robbie Tovar)  /Age: 1955/70 y.o.     In-Person       Chief Complaint: left knee pain    HPI  This is a very pleasant 71 yo with PmHx of insomnia, HTN, GERD, and OA of left knee.  Pt reports having right knee replacement about 3 years ago.       Medical History[1]    Surgical History[2]    Patient  has no history on file for sexual activity.    Family History[3]    Allergies[4]    Prior to Admission medications    Medication Sig Start Date End Date Taking? Authorizing Provider   alpha lipoic acid 50 mg capsule Take by mouth.    Historical Provider, MD   celecoxib (CeleBREX) 200 mg capsule Take 1 capsule (200 mg) by mouth 2 times a day as needed for mild pain (1 - 3) 25   Isidoro Pérez MD   fluticasone (Flonase Allergy Relief) 50 mcg/actuation nasal spray Administer 2 sprays into each nostril once daily. 25   Isidoro Pérez MD   fluticasone (Flonase) 50 mcg/actuation nasal spray Administer 2 sprays into each nostril once daily 3/18/24      fluticasone (Flonase) 50 mcg/actuation nasal spray Administer 2 sprays into each nostril once daily. 24   Isidoro Pérez MD   olopatadine (Pataday) 0.2 % ophthalmic solution Administer 1 drop into both eyes once daily. 4/3/25 5/3/25  Jovani Wiseman MD   pantoprazole (ProtoNix) 40 mg EC tablet Take 1 tablet (40 mg) by mouth once every 24 hours. 25   Isidoro Pérez MD   zolpidem (Ambien) 10 mg tablet TAKE 1 TABLET (10 MG) BY MOUTH AS NEEDED AT BEDTIME FOR SLEEP. 6/6/25 12/3/25  Isidoro Pérez MD        PAT ROS:   Constitutional:   neg    Neuro/Psych:   neg    Eyes:   neg     use of corrective lenses  Ears:   neg    Nose:    Hx of chronic sinusitis  Mouth:   neg    Throat:   neg    Neck:    No carotid bruits auscultated  neg    Cardio:   neg    Respiratory:   neg    Endocrine:   neg    GI:    GERD takes PPI  :   neg    Musculoskeletal:    See HPI  Hematologic:   neg    Skin:  neg        Physical  "Exam  Constitutional:       Appearance: Normal appearance.   HENT:      Head: Normocephalic and atraumatic.      Nose: Nose normal.      Mouth/Throat:      Mouth: Mucous membranes are moist.   Eyes:      Pupils: Pupils are equal, round, and reactive to light.   Neck:      Comments: No carotid bruits auscultated  Cardiovascular:      Rate and Rhythm: Normal rate and regular rhythm.   Pulmonary:      Effort: Pulmonary effort is normal.      Breath sounds: Normal breath sounds.   Abdominal:      General: Bowel sounds are normal.      Palpations: Abdomen is soft.   Musculoskeletal:      Cervical back: Normal range of motion.      Comments: 1+ LE ankle edema right no LE on left   Skin:     General: Skin is warm and dry.   Neurological:      General: No focal deficit present.      Mental Status: He is alert and oriented to person, place, and time.   Psychiatric:         Mood and Affect: Mood normal.         Behavior: Behavior normal.      Airway: nl neck   Anesthesia:  Patient denies any anesthesia complications.   Teeth: ROM    Testing/Diagnostic:   ECG: NSR rate of 74    Patient Specialist/PCP: Dr. Pérez    Visit Vitals  /84   Pulse 74   Temp 36.5 °C (97.7 °F) (Temporal)   Resp 14   Ht 1.854 m (6' 1\")   Wt 107 kg (235 lb 14.3 oz)   SpO2 96%   BMI 31.12 kg/m²   Smoking Status Never   BSA 2.35 m²       DASI Risk Score      Flowsheet Row Pre-Admission Testing from 7/3/2025 in [facility] Questionnaire Series Submission from 6/4/2025 in [facility] OR with Generic Provider Myckatiet   Can you take care of yourself (eat, dress, bathe, or use toilet)?  2.75 filed at 07/03/2025 1345 2.75  filed at 06/04/2025 1911   Can you walk indoors, such as around your house? 1.75 filed at 07/03/2025 1345 1.75  filed at 06/04/2025 1911   Can you walk a block or two on level ground?  2.75 filed at 07/03/2025 1345 2.75  filed at 06/04/2025 1911   Can you climb a flight of stairs or walk up a hill? 5.5 filed " at 07/03/2025 1345 5.5  filed at 06/04/2025 1911   Can you run a short distance? 0 filed at 07/03/2025 1345 0  filed at 06/04/2025 1911   Can you do light work around the house like dusting or washing dishes? 2.7 filed at 07/03/2025 1345 2.7  filed at 06/04/2025 1911   Can you do moderate work around the house like vacuuming, sweeping floors or carrying groceries? 3.5 filed at 07/03/2025 1345 3.5  filed at 06/04/2025 1911   Can you do heavy work around the house like scrubbing floors or lifting and moving heavy furniture?  8 filed at 07/03/2025 1345 8  filed at 06/04/2025 1911   Can you do yard work like raking leaves, weeding or pushing a mower? 4.5 filed at 07/03/2025 1345 0  filed at 06/04/2025 1911   Can you have sexual relations? 5.25 filed at 07/03/2025 1345 5.25  filed at 06/04/2025 1911   Can you participate in moderate recreational activities like golf, bowling, dancing, doubles tennis or throwing a baseball or football? 0 filed at 07/03/2025 1345 0  filed at 06/04/2025 1911   Can you participate in strenous sports like swimming, singles tennis, football, basketball, or skiing? 0 filed at 07/03/2025 1345 0  filed at 06/04/2025 1911   DASI SCORE 36.7 filed at 07/03/2025 1345 32.2  filed at 06/04/2025 1911   METS Score (Will be calculated only when all the questions are answered) 7.3 filed at 07/03/2025 1345 6.7  filed at 06/04/2025 1911          Caprini DVT Assessment      Flowsheet Row Pre-Admission Testing from 7/3/2025 in [facility]   DVT Score (IF A SCORE IS NOT CALCULATING, MUST SELECT A BMI TO COMPLETE) 10 filed at 07/03/2025 1344   Surgical Factors Elective major lower extremity arthroplasty filed at 07/03/2025 1344   BMI (BMI MUST BE CHOSEN) 31-40 (Obesity) filed at 07/03/2025 1344          Modified Frailty Index      Flowsheet Row Pre-Admission Testing from 7/3/2025 in [facility]   Non-independent functional status (problems with dressing, bathing, personal grooming,  or cooking) 0 filed at 07/03/2025 1346   History of diabetes mellitus  0 filed at 07/03/2025 1346   History of COPD 0 filed at 07/03/2025 1346   History of CHF No filed at 07/03/2025 1346   History of MI 0 filed at 07/03/2025 1346   History of Percutaneous Coronary Intervention, Cardiac Surgery, or Angina No filed at 07/03/2025 1346   Hypertension requiring the use of medication  0 filed at 07/03/2025 1346   Peripheral vascular disease 0 filed at 07/03/2025 1346   Impaired sensorium (cognitive impairement or loss, clouding, or delirium) 0 filed at 07/03/2025 1346   TIA or CVA withouy residual deficit 0 filed at 07/03/2025 1346   Cerebrovascular accident with deficit 0 filed at 07/03/2025 1346   Modified Frailty Index Calculator 0 filed at 07/03/2025 1346          [id number] Stroke Risk Points  Current as of just now        N/A 0 to 9 Points:      Last Change: N/A          The NSA1QG1-VFZo risk score (Lip TESSIE, et al. 2009. © 2010 [employer]) quantifies the risk of stroke for a patient with atrial fibrillation. For patients without atrial fibrillation or under the age of 18 this score appears as N/A. Higher score values generally indicate higher risk of stroke.        This score is not applicable to this patient. Components are not calculated.          Revised Cardiac Risk Index      Flowsheet Row Pre-Admission Testing from 7/3/2025 in [facility]   High-Risk Surgery (Intraperitoneal, Intrathoracic,Suprainguinal vascular) 0 filed at 07/03/2025 1345   History of ischemic heart disease (History of MI, History of positive exercuse test, Current chest paint considered due to myocardial ischemia, Use of nitrate therapy, ECG with pathological Q Waves) 0 filed at 07/03/2025 1345   History of congestive heart failure (pulmonary edemia, bilateral rales or S3 gallop, Paroxysmal nocturnal dyspnea, CXR showing pulmonary vascular redistribution) 0 filed at 07/03/2025 1345   History of  cerebrovascular disease (Prior TIA or stroke) 0 filed at 2025 1345   Pre-operative insulin treatment 0 filed at 2025 1345   Pre-operative creatinine>2 mg/dl 0 filed at 2025 1345   Revised Cardiac Risk Calculator 0 filed at 2025 1345          Apfel Simplified Score      Flowsheet Row Pre-Admission Testing from 7/3/2025 in [facility]   Smoking status 1 filed at 2025 1345   History of motion sickness or PONV  0 filed at 2025 1345   Use of postoperative opioids 1 filed at 2025 1345   Gender - Female 0=No filed at 2025 1345   Apfel Simplified Score Calculator 2 filed at 2025 1345          Risk Analysis Index Results This Encounter         7/3/2025  1346             Do you live in a place other than your own home?: 0    When did you begin living in the place you are currently residing?: Greater than one year ago    Any kidney failure, kidney not working well, or seeing a kidney doctor (nephrologist)? If yes, was this for kidney stones or another problem?: 0 No    Any history of chronic (long-term) congestive heart failure (CHF)?: 0 No    Any shortness of breath when resting?: 0 No    In the past five years, have you been diagnosed with or treated for cancer?: No    During the last 3 months has it become difficult for you to remember things or organize your thoughts?: 0 No    Have you lost weight of 10 pounds or more in the past 3 months without trying?: 0 No    Do you have any loss of appetitie?: 0 No    Getting Around (Mobility): 0 Can get around without help    Eatin Can plan and prepare own meals    Toiletin Can use toilet without any help    Personal Hygiene (Bathing, Hand Washing, Changing Clothes): 0 Can shower or bathe without any help    REY Cancer History: Patient does not indicate history of cancer    Total Risk Analysis Index Score Without Cancer: 23    Total Risk Analysis Index Score: 23          Stop Bang Score      Flowsheet Row  Pre-Admission Testing from 7/3/2025 in [facility] [place], [place] Questionnaire Series Submission from 6/4/2025 in [facility] [place] [place] OR with Generic Provider Mitchel   Do you snore loudly? 0 filed at 07/03/2025 1345 0  filed at 06/04/2025 1911   Do you often feel tired or fatigued after your sleep? 0 filed at 07/03/2025 1345 0  filed at 06/04/2025 1911   Has anyone ever observed you stop breathing in your sleep? 0 filed at 07/03/2025 1345 0  filed at 06/04/2025 1911   Do you have or are you being treated for high blood pressure? 0 filed at 07/03/2025 1345 0  filed at 06/04/2025 1911   Recent BMI (Calculated) 31 filed at 07/03/2025 1345 31  filed at 06/04/2025 1911   Is BMI greater than 35 kg/m2? 0=No filed at 07/03/2025 1345 0=No  filed at 06/04/2025 1911   Age older than 50 years old? 1=Yes filed at 07/03/2025 1345 1=Yes  filed at 06/04/2025 1911   Is your neck circumference greater than 17 inches (Male) or 16 inches (Female)? 0 filed at 07/03/2025 1345 --   Gender - Male 1=Yes filed at 07/03/2025 1345 1=Yes  filed at 06/04/2025 1911   STOP-BANG Total Score 2 filed at 07/03/2025 1345 --          Prodigy: High Risk  Total Score: 16              Prodigy Age Score      Prodigy Gender Score          ARISCAT Score for Postoperative Pulmonary Complications      Flowsheet Row Pre-Admission Testing from 7/3/2025 in [facility] [place], [place]   Age Calculated Score 3 filed at 07/03/2025 1346   Preoperative SpO2 0 filed at 07/03/2025 1346   Respiratory infection in the last month Either upper or lower (i.e., URI, bronchitis, pneumonia), with fever and antibiotic treatment 0 filed at 07/03/2025 1346   Preoperative anemia (Hgb less than 10 g/dl) 0 filed at 07/03/2025 1346   Surgical incision  0 filed at 07/03/2025 1346   Duration of surgery  0 filed at 07/03/2025 1346   Emergency Procedure  0 filed at 07/03/2025 1346   ARISCAT Total Score  3 filed at 07/03/2025 1346          Garcia Perioperative Risk for Myocardial  Infarction or Cardiac Arrest (PASCALE)      Flowsheet Row Pre-Admission Testing from 7/3/2025 in [facility]   Calculated Age Score 1.36 filed at 2025 1346   Functional Status  0 filed at 2025 1346   ASA Class  -3.29 filed at 2025 1346   Creatinine 0 filed at 2025 1346   Type of Procedure  0.80 filed at 2025 1346   PASCALE Total Score  -6.38 filed at 2025 1346   PASCALE % 0.17 filed at 2025 1346        Assessment and Plan:     Plan for OR on  for        LEFT KNEE MEDIAL UNICOMPARTMENTAL ARTHROPLASTY [69259 (CPT®)] Left General   23HR OBSERVATIION     Due to Unilateral primary osteoarthritis, left knee   PTT, INR, CMP, CBC with diff, A1C    HEENT/Airway:  no significant findings on chart review or clinical presentation    Cardiovascular:  no significant findings on chart review or clinical presentation  DASI  Duke Activity Status Index (DASI)  DASI Score: 36.7   MET Score: 7.3  RCRI: 0 points, 3.9%  risk for postoperative MACE   PASCALE: 0.17% risk for postoperative MACE      Pulmonary:  no significant findings on chart review or clinical presentation    Preoperative deep breathing educational handout provided to patient.  ARISCAT:  3    points which is a low (1.6%) risk of in-hospital post-op pulmonary complications   STOP BAN  points which is a low risk for moderate to severe MARICARMEN    Renal: no significant findings on chart review or clinical presentation    Endocrine:  no significant findings on chart review or clinical presentation    Hematologic:   no significant findings on chart review or clinical presentation  Preoperative DVT educational handout provided to patient.  Caprini Score: 10   points which is a highest risk of perioperative VTE    Gastrointestinal:    Hx of GERD on PPI  Apfel: 2 points 39% risk for post operative N/V    Infectious disease:  no significant findings on chart review or clinical presentation     Musculoskeletal:    See surgical  plan    Neuro:    No neurologic diagnoses, however, the patient is at an increased risk for post operative delirium secondary to age >/= 65.  Preoperative brain exercise educational handout provided to patient.    The patient is at an increased risk for perioperative stroke secondary to increased age and HTN.    I spent a total of  30  minutes on the date of the service which included preparing to see the patient, face-to-face patient care,  performing a physical examination, completing clinical documentation, discussing assessment and plan with the patient/family/caregiver, educating the patient/family/caregiver on pre-operative medication instructions and ordering tests if applicable.                 [1]   Past Medical History:  Diagnosis Date    Chronic sinusitis, unspecified 09/21/2016    Clinical sinusitis    Low back pain     Plantar fascial fibromatosis 09/21/2016    Plantar fasciitis    Primary insomnia 09/21/2016    Primary insomnia    Unilateral primary osteoarthritis, left knee    [2]   Past Surgical History:  Procedure Laterality Date    KNEE ARTHROPLASTY      OTHER SURGICAL HISTORY  05/18/2021    Nasal septoplasty    OTHER SURGICAL HISTORY  05/18/2021    Tonsillectomy with adenoidectomy    OTHER SURGICAL HISTORY  05/12/2020    Hernia repair   [3]   Family History  Problem Relation Name Age of Onset    Allergies Mother      Other (cardiac disorder) Mother          stent 70s    Hypertension Mother      Hypertension Father      Allergies Sister      Glaucoma Neg Hx      Macular degeneration Neg Hx     [4]   Allergies  Allergen Reactions    Iodinated Contrast Media Nausea/vomiting    Tramadol Nausea/vomiting

## 2025-07-04 LAB
ATRIAL RATE: 76 BPM
CHOLEST SERPL-MCNC: 151 MG/DL
CHOLEST/HDLC SERPL: 4.3 (CALC)
EST. AVERAGE GLUCOSE BLD GHB EST-MCNC: 126 MG/DL
EST. AVERAGE GLUCOSE BLD GHB EST-SCNC: 7 MMOL/L
HBA1C MFR BLD: 6 %
HDLC SERPL-MCNC: 35 MG/DL
LDLC SERPL CALC-MCNC: 90 MG/DL (CALC)
NONHDLC SERPL-MCNC: 116 MG/DL (CALC)
P AXIS: 47 DEGREES
P OFFSET: 195 MS
P ONSET: 136 MS
PR INTERVAL: 150 MS
PSA SERPL-MCNC: 0.96 NG/ML
Q ONSET: 211 MS
QRS COUNT: 13 BEATS
QRS DURATION: 106 MS
QT INTERVAL: 394 MS
QTC CALCULATION(BAZETT): 443 MS
QTC FREDERICIA: 426 MS
R AXIS: 13 DEGREES
T AXIS: 53 DEGREES
T OFFSET: 408 MS
TRIGL SERPL-MCNC: 160 MG/DL
TSH SERPL-ACNC: 0.78 MIU/L (ref 0.4–4.5)
VENTRICULAR RATE: 76 BPM

## 2025-07-07 ENCOUNTER — PHARMACY VISIT (OUTPATIENT)
Dept: PHARMACY | Facility: CLINIC | Age: 70
End: 2025-07-07
Payer: COMMERCIAL

## 2025-07-07 DIAGNOSIS — R73.03 PREDIABETES: Primary | ICD-10-CM

## 2025-07-07 PROCEDURE — RXMED WILLOW AMBULATORY MEDICATION CHARGE

## 2025-07-08 ENCOUNTER — APPOINTMENT (OUTPATIENT)
Dept: RADIOLOGY | Facility: HOSPITAL | Age: 70
End: 2025-07-08
Payer: COMMERCIAL

## 2025-07-08 ENCOUNTER — APPOINTMENT (OUTPATIENT)
Dept: PREADMISSION TESTING | Facility: HOSPITAL | Age: 70
End: 2025-07-08
Payer: COMMERCIAL

## 2025-07-17 RX ORDER — CEFAZOLIN SODIUM 2 G/100ML
2 INJECTION, SOLUTION INTRAVENOUS ONCE
Status: CANCELLED | OUTPATIENT
Start: 2025-07-17 | End: 2025-07-17

## 2025-07-17 RX ORDER — CELECOXIB 50 MG/1
200 CAPSULE ORAL ONCE
Status: CANCELLED | OUTPATIENT
Start: 2025-07-17 | End: 2025-07-17

## 2025-07-17 RX ORDER — ACETAMINOPHEN 325 MG/1
650 TABLET ORAL ONCE
Status: CANCELLED | OUTPATIENT
Start: 2025-07-17 | End: 2025-07-17

## 2025-07-17 RX ORDER — TRANEXAMIC ACID 650 MG/1
1950 TABLET ORAL ONCE
Status: CANCELLED | OUTPATIENT
Start: 2025-07-17 | End: 2025-07-17

## 2025-07-17 NOTE — H&P
History Of Present Illness  Robbie Tovar is a 70 y.o. male presenting with left knee DJD.     Past Medical History  He has a past medical history of Chronic sinusitis, unspecified (09/21/2016), Low back pain, Plantar fascial fibromatosis (09/21/2016), Primary insomnia (09/21/2016), and Unilateral primary osteoarthritis, left knee.    Surgical History  He has a past surgical history that includes Other surgical history (05/18/2021); Other surgical history (05/18/2021); Other surgical history (05/12/2020); and Knee Arthroplasty.     Social History  He reports that he has never smoked. He has never been exposed to tobacco smoke. He has never used smokeless tobacco. He reports that he does not drink alcohol and does not use drugs.    Family History  Family History[1]     Allergies  Iodinated contrast media and Tramadol    Review of Systems  CONSTITUTIONAL: Denies weight loss, fever and chills.    - HEENT: Denies changes in vision and hearing.    - RESPIRATORY: Denies SOB and cough.    - CV: Denies palpitations and CP.    - GI: Denies abdominal pain, nausea, vomiting and diarrhea.    - : Denies dysuria and urinary frequency.    - MSK: See physical exam findings     - SKIN: Denies rash and pruritus.    - NEUROLOGICAL: Denies headache and syncope.    - PSYCHIATRIC: Denies recent changes in mood. Denies anxiety and depression.      Physical Exam - GENERAL: Alert and oriented x 3. No acute distress. Well-nourished.    - EYES: EOMI. Anicteric.    - HENT: Moist mucous membranes. No scleral icterus. No cervical lymphadenopathy.    - LUNGS: Clear to auscultation bilaterally. No accessory muscle use.    - CARDIOVASCULAR: Regular rate and rhythm. No murmur. No JVD.    - ABDOMEN: Soft, non-tender and non-distended. No palpable masses.    - EXTREMITIES: pain with ROM. Medial pain, correctable varus     - NEUROLOGIC: No focal neurological deficits. CN II-XII grossly intact, but not individually tested.    - PSYCHIATRIC:  Cooperative. Appropriate mood and affect.      Last Recorded Vitals  There were no vitals taken for this visit.    Relevant Results      Scheduled medications  Scheduled Medications[2]  Continuous medications  Continuous Medications[3]  PRN medications  PRN Medications[4]  No results found for this or any previous visit (from the past 24 hours).    Assessment/Plan   Assessment & Plan  Unilateral primary osteoarthritis, left knee      Agreeable to left medial UKA with MIKE       I spent 10 minutes in the professional and overall care of this patient.      Luis Pringle PA-C         [1]   Family History  Problem Relation Name Age of Onset    Allergies Mother      Other (cardiac disorder) Mother          stent 70s    Hypertension Mother      Hypertension Father      Allergies Sister      Glaucoma Neg Hx      Macular degeneration Neg Hx     [2] diazePAM, 10 mg, oral, Once    [3]    [4]

## 2025-07-18 ENCOUNTER — ANESTHESIA EVENT (OUTPATIENT)
Dept: OPERATING ROOM | Facility: HOSPITAL | Age: 70
End: 2025-07-18
Payer: COMMERCIAL

## 2025-07-18 ENCOUNTER — HOSPITAL ENCOUNTER (OUTPATIENT)
Facility: HOSPITAL | Age: 70
Discharge: HOME | End: 2025-07-19
Attending: ORTHOPAEDIC SURGERY | Admitting: ORTHOPAEDIC SURGERY
Payer: COMMERCIAL

## 2025-07-18 ENCOUNTER — ANESTHESIA (OUTPATIENT)
Dept: OPERATING ROOM | Facility: HOSPITAL | Age: 70
End: 2025-07-18
Payer: COMMERCIAL

## 2025-07-18 DIAGNOSIS — Z96.652 STATUS POST LEFT KNEE REPLACEMENT: ICD-10-CM

## 2025-07-18 DIAGNOSIS — Z96.652 TOTAL KNEE REPLACEMENT STATUS, LEFT: ICD-10-CM

## 2025-07-18 DIAGNOSIS — Z78.9 DEEP VEIN THROMBOSIS (DVT) PROPHYLAXIS PRESCRIBED AT DISCHARGE: ICD-10-CM

## 2025-07-18 DIAGNOSIS — K59.03 CONSTIPATION DUE TO OPIOID THERAPY: ICD-10-CM

## 2025-07-18 DIAGNOSIS — T40.2X5A CONSTIPATION DUE TO OPIOID THERAPY: ICD-10-CM

## 2025-07-18 DIAGNOSIS — R11.2 NAUSEA AND VOMITING IN ADULT: ICD-10-CM

## 2025-07-18 DIAGNOSIS — M17.12 UNILATERAL PRIMARY OSTEOARTHRITIS, LEFT KNEE: Primary | ICD-10-CM

## 2025-07-18 LAB
ABO GROUP (TYPE) IN BLOOD: NORMAL
ANTIBODY SCREEN: NORMAL
RH FACTOR (ANTIGEN D): NORMAL

## 2025-07-18 PROCEDURE — C1776 JOINT DEVICE (IMPLANTABLE): HCPCS | Performed by: ORTHOPAEDIC SURGERY

## 2025-07-18 PROCEDURE — 2780000003 HC OR 278 NO HCPCS: Performed by: ORTHOPAEDIC SURGERY

## 2025-07-18 PROCEDURE — 3600000017 HC OR TIME - EACH INCREMENTAL 1 MINUTE - PROCEDURE LEVEL SIX: Performed by: ORTHOPAEDIC SURGERY

## 2025-07-18 PROCEDURE — 2500000001 HC RX 250 WO HCPCS SELF ADMINISTERED DRUGS (ALT 637 FOR MEDICARE OP): Performed by: STUDENT IN AN ORGANIZED HEALTH CARE EDUCATION/TRAINING PROGRAM

## 2025-07-18 PROCEDURE — 2500000002 HC RX 250 W HCPCS SELF ADMINISTERED DRUGS (ALT 637 FOR MEDICARE OP, ALT 636 FOR OP/ED): Performed by: STUDENT IN AN ORGANIZED HEALTH CARE EDUCATION/TRAINING PROGRAM

## 2025-07-18 PROCEDURE — 51701 INSERT BLADDER CATHETER: CPT

## 2025-07-18 PROCEDURE — 3600000018 HC OR TIME - INITIAL BASE CHARGE - PROCEDURE LEVEL SIX: Performed by: ORTHOPAEDIC SURGERY

## 2025-07-18 PROCEDURE — 97116 GAIT TRAINING THERAPY: CPT | Mod: GP

## 2025-07-18 PROCEDURE — 97161 PT EVAL LOW COMPLEX 20 MIN: CPT | Mod: GP

## 2025-07-18 PROCEDURE — 2500000002 HC RX 250 W HCPCS SELF ADMINISTERED DRUGS (ALT 637 FOR MEDICARE OP, ALT 636 FOR OP/ED): Performed by: ORTHOPAEDIC SURGERY

## 2025-07-18 PROCEDURE — 7100000002 HC RECOVERY ROOM TIME - EACH INCREMENTAL 1 MINUTE: Performed by: ORTHOPAEDIC SURGERY

## 2025-07-18 PROCEDURE — 2500000001 HC RX 250 WO HCPCS SELF ADMINISTERED DRUGS (ALT 637 FOR MEDICARE OP): Performed by: ORTHOPAEDIC SURGERY

## 2025-07-18 PROCEDURE — 2500000004 HC RX 250 GENERAL PHARMACY W/ HCPCS (ALT 636 FOR OP/ED): Performed by: STUDENT IN AN ORGANIZED HEALTH CARE EDUCATION/TRAINING PROGRAM

## 2025-07-18 PROCEDURE — C1713 ANCHOR/SCREW BN/BN,TIS/BN: HCPCS | Performed by: ORTHOPAEDIC SURGERY

## 2025-07-18 PROCEDURE — 20985 CPTR-ASST DIR MS PX: CPT | Performed by: ORTHOPAEDIC SURGERY

## 2025-07-18 PROCEDURE — 27446 REVISION OF KNEE JOINT: CPT | Performed by: STUDENT IN AN ORGANIZED HEALTH CARE EDUCATION/TRAINING PROGRAM

## 2025-07-18 PROCEDURE — 27446 REVISION OF KNEE JOINT: CPT | Performed by: ORTHOPAEDIC SURGERY

## 2025-07-18 PROCEDURE — 2500000004 HC RX 250 GENERAL PHARMACY W/ HCPCS (ALT 636 FOR OP/ED): Performed by: ANESTHESIOLOGY

## 2025-07-18 PROCEDURE — 7100000001 HC RECOVERY ROOM TIME - INITIAL BASE CHARGE: Performed by: ORTHOPAEDIC SURGERY

## 2025-07-18 PROCEDURE — 7100000011 HC EXTENDED STAY RECOVERY HOURLY - NURSING UNIT

## 2025-07-18 PROCEDURE — 2500000005 HC RX 250 GENERAL PHARMACY W/O HCPCS: Performed by: ANESTHESIOLOGIST ASSISTANT

## 2025-07-18 PROCEDURE — 86901 BLOOD TYPING SEROLOGIC RH(D): CPT | Performed by: STUDENT IN AN ORGANIZED HEALTH CARE EDUCATION/TRAINING PROGRAM

## 2025-07-18 PROCEDURE — 2500000005 HC RX 250 GENERAL PHARMACY W/O HCPCS: Performed by: STUDENT IN AN ORGANIZED HEALTH CARE EDUCATION/TRAINING PROGRAM

## 2025-07-18 PROCEDURE — 36415 COLL VENOUS BLD VENIPUNCTURE: CPT | Performed by: STUDENT IN AN ORGANIZED HEALTH CARE EDUCATION/TRAINING PROGRAM

## 2025-07-18 PROCEDURE — 2500000004 HC RX 250 GENERAL PHARMACY W/ HCPCS (ALT 636 FOR OP/ED): Performed by: ORTHOPAEDIC SURGERY

## 2025-07-18 PROCEDURE — 2720000007 HC OR 272 NO HCPCS: Performed by: ORTHOPAEDIC SURGERY

## 2025-07-18 PROCEDURE — 3700000002 HC GENERAL ANESTHESIA TIME - EACH INCREMENTAL 1 MINUTE: Performed by: ORTHOPAEDIC SURGERY

## 2025-07-18 PROCEDURE — 3700000001 HC GENERAL ANESTHESIA TIME - INITIAL BASE CHARGE: Performed by: ORTHOPAEDIC SURGERY

## 2025-07-18 PROCEDURE — 2500000004 HC RX 250 GENERAL PHARMACY W/ HCPCS (ALT 636 FOR OP/ED): Performed by: ANESTHESIOLOGIST ASSISTANT

## 2025-07-18 PROCEDURE — A6213 FOAM DRG >16<=48 SQ IN W/BDR: HCPCS | Performed by: ORTHOPAEDIC SURGERY

## 2025-07-18 DEVICE — MCK FEMORAL COMPONENT
Type: IMPLANTABLE DEVICE | Site: KNEE | Status: FUNCTIONAL
Brand: RESTORIS

## 2025-07-18 DEVICE — MCK ONLAY TIBIA BASEPLATE
Type: IMPLANTABLE DEVICE | Site: KNEE | Status: FUNCTIONAL
Brand: RESTORIS

## 2025-07-18 DEVICE — IMPLANTABLE DEVICE: Type: IMPLANTABLE DEVICE | Site: KNEE | Status: FUNCTIONAL

## 2025-07-18 DEVICE — IMPLANTABLE DEVICE
Type: IMPLANTABLE DEVICE | Site: KNEE | Status: FUNCTIONAL
Brand: BIOMET® BONE CEMENT R

## 2025-07-18 RX ORDER — MIDAZOLAM HYDROCHLORIDE 1 MG/ML
1 INJECTION, SOLUTION INTRAMUSCULAR; INTRAVENOUS ONCE AS NEEDED
Status: DISCONTINUED | OUTPATIENT
Start: 2025-07-18 | End: 2025-07-18 | Stop reason: HOSPADM

## 2025-07-18 RX ORDER — HYDRALAZINE HYDROCHLORIDE 20 MG/ML
5 INJECTION INTRAMUSCULAR; INTRAVENOUS EVERY 30 MIN PRN
Status: DISCONTINUED | OUTPATIENT
Start: 2025-07-18 | End: 2025-07-18 | Stop reason: HOSPADM

## 2025-07-18 RX ORDER — OXYCODONE HYDROCHLORIDE 5 MG/1
5 TABLET ORAL EVERY 4 HOURS PRN
Status: DISCONTINUED | OUTPATIENT
Start: 2025-07-18 | End: 2025-07-19 | Stop reason: HOSPADM

## 2025-07-18 RX ORDER — TRANEXAMIC ACID 650 MG/1
1950 TABLET ORAL ONCE
Status: COMPLETED | OUTPATIENT
Start: 2025-07-18 | End: 2025-07-18

## 2025-07-18 RX ORDER — MORPHINE SULFATE 2 MG/ML
2 INJECTION, SOLUTION INTRAMUSCULAR; INTRAVENOUS EVERY 2 HOUR PRN
Status: DISCONTINUED | OUTPATIENT
Start: 2025-07-18 | End: 2025-07-19 | Stop reason: HOSPADM

## 2025-07-18 RX ORDER — PANTOPRAZOLE SODIUM 40 MG/1
40 TABLET, DELAYED RELEASE ORAL EVERY 24 HOURS
Status: DISCONTINUED | OUTPATIENT
Start: 2025-07-18 | End: 2025-07-19 | Stop reason: HOSPADM

## 2025-07-18 RX ORDER — DEXAMETHASONE SODIUM PHOSPHATE 10 MG/ML
INJECTION INTRAMUSCULAR; INTRAVENOUS AS NEEDED
Status: DISCONTINUED | OUTPATIENT
Start: 2025-07-18 | End: 2025-07-18

## 2025-07-18 RX ORDER — ACETAMINOPHEN 325 MG/1
650 TABLET ORAL ONCE
Status: COMPLETED | OUTPATIENT
Start: 2025-07-18 | End: 2025-07-18

## 2025-07-18 RX ORDER — DOCUSATE SODIUM 100 MG/1
100 CAPSULE, LIQUID FILLED ORAL 2 TIMES DAILY
Status: DISCONTINUED | OUTPATIENT
Start: 2025-07-18 | End: 2025-07-19 | Stop reason: HOSPADM

## 2025-07-18 RX ORDER — ACETAMINOPHEN 325 MG/1
650 TABLET ORAL EVERY 6 HOURS SCHEDULED
Status: DISCONTINUED | OUTPATIENT
Start: 2025-07-18 | End: 2025-07-19 | Stop reason: HOSPADM

## 2025-07-18 RX ORDER — PROPOFOL 10 MG/ML
INJECTION, EMULSION INTRAVENOUS AS NEEDED
Status: DISCONTINUED | OUTPATIENT
Start: 2025-07-18 | End: 2025-07-18

## 2025-07-18 RX ORDER — BUPIVACAINE HYDROCHLORIDE 7.5 MG/ML
INJECTION, SOLUTION EPIDURAL; RETROBULBAR AS NEEDED
Status: DISCONTINUED | OUTPATIENT
Start: 2025-07-18 | End: 2025-07-18

## 2025-07-18 RX ORDER — METOCLOPRAMIDE HYDROCHLORIDE 5 MG/ML
10 INJECTION INTRAMUSCULAR; INTRAVENOUS ONCE AS NEEDED
Status: COMPLETED | OUTPATIENT
Start: 2025-07-18 | End: 2025-07-18

## 2025-07-18 RX ORDER — BISACODYL 5 MG
10 TABLET, DELAYED RELEASE (ENTERIC COATED) ORAL DAILY PRN
Status: DISCONTINUED | OUTPATIENT
Start: 2025-07-18 | End: 2025-07-19 | Stop reason: HOSPADM

## 2025-07-18 RX ORDER — TRANEXAMIC ACID 650 MG/1
1950 TABLET ORAL ONCE
Status: COMPLETED | OUTPATIENT
Start: 2025-07-19 | End: 2025-07-19

## 2025-07-18 RX ORDER — PHENYLEPHRINE HCL IN 0.9% NACL 1 MG/10 ML
SYRINGE (ML) INTRAVENOUS AS NEEDED
Status: DISCONTINUED | OUTPATIENT
Start: 2025-07-18 | End: 2025-07-18

## 2025-07-18 RX ORDER — DIPHENHYDRAMINE HYDROCHLORIDE 50 MG/ML
12.5 INJECTION, SOLUTION INTRAMUSCULAR; INTRAVENOUS ONCE AS NEEDED
Status: DISCONTINUED | OUTPATIENT
Start: 2025-07-18 | End: 2025-07-18 | Stop reason: HOSPADM

## 2025-07-18 RX ORDER — NALOXONE HYDROCHLORIDE 0.4 MG/ML
0.2 INJECTION, SOLUTION INTRAMUSCULAR; INTRAVENOUS; SUBCUTANEOUS EVERY 5 MIN PRN
Status: DISCONTINUED | OUTPATIENT
Start: 2025-07-18 | End: 2025-07-19 | Stop reason: HOSPADM

## 2025-07-18 RX ORDER — ALBUTEROL SULFATE 0.83 MG/ML
2.5 SOLUTION RESPIRATORY (INHALATION)
Status: DISCONTINUED | OUTPATIENT
Start: 2025-07-18 | End: 2025-07-18 | Stop reason: HOSPADM

## 2025-07-18 RX ORDER — CELECOXIB 200 MG/1
200 CAPSULE ORAL ONCE
Status: COMPLETED | OUTPATIENT
Start: 2025-07-18 | End: 2025-07-18

## 2025-07-18 RX ORDER — HYDROCODONE BITARTRATE AND ACETAMINOPHEN 5; 325 MG/1; MG/1
1 TABLET ORAL EVERY 4 HOURS PRN
Status: DISCONTINUED | OUTPATIENT
Start: 2025-07-18 | End: 2025-07-18 | Stop reason: HOSPADM

## 2025-07-18 RX ORDER — LABETALOL HYDROCHLORIDE 5 MG/ML
5 INJECTION, SOLUTION INTRAVENOUS
Status: DISCONTINUED | OUTPATIENT
Start: 2025-07-18 | End: 2025-07-18 | Stop reason: HOSPADM

## 2025-07-18 RX ORDER — MIDAZOLAM HYDROCHLORIDE 1 MG/ML
INJECTION, SOLUTION INTRAMUSCULAR; INTRAVENOUS AS NEEDED
Status: DISCONTINUED | OUTPATIENT
Start: 2025-07-18 | End: 2025-07-18

## 2025-07-18 RX ORDER — OXYCODONE HYDROCHLORIDE 10 MG/1
10 TABLET ORAL EVERY 4 HOURS PRN
Refills: 0 | Status: DISCONTINUED | OUTPATIENT
Start: 2025-07-18 | End: 2025-07-19 | Stop reason: HOSPADM

## 2025-07-18 RX ORDER — ONDANSETRON 4 MG/1
4 TABLET, ORALLY DISINTEGRATING ORAL EVERY 8 HOURS PRN
Status: DISCONTINUED | OUTPATIENT
Start: 2025-07-18 | End: 2025-07-19 | Stop reason: HOSPADM

## 2025-07-18 RX ORDER — FENTANYL CITRATE 50 UG/ML
INJECTION, SOLUTION INTRAMUSCULAR; INTRAVENOUS AS NEEDED
Status: DISCONTINUED | OUTPATIENT
Start: 2025-07-18 | End: 2025-07-18

## 2025-07-18 RX ORDER — ROPIVACAINE HYDROCHLORIDE 5 MG/ML
INJECTION, SOLUTION EPIDURAL; INFILTRATION; PERINEURAL AS NEEDED
Status: DISCONTINUED | OUTPATIENT
Start: 2025-07-18 | End: 2025-07-18

## 2025-07-18 RX ORDER — ROPIVACAINE/EPI/CLONIDINE/KET 2.46-0.005
SYRINGE (ML) INJECTION AS NEEDED
Status: DISCONTINUED | OUTPATIENT
Start: 2025-07-18 | End: 2025-07-18 | Stop reason: HOSPADM

## 2025-07-18 RX ORDER — ONDANSETRON HYDROCHLORIDE 2 MG/ML
4 INJECTION, SOLUTION INTRAVENOUS EVERY 8 HOURS PRN
Status: DISCONTINUED | OUTPATIENT
Start: 2025-07-18 | End: 2025-07-19 | Stop reason: HOSPADM

## 2025-07-18 RX ORDER — SODIUM CHLORIDE, SODIUM LACTATE, POTASSIUM CHLORIDE, CALCIUM CHLORIDE 600; 310; 30; 20 MG/100ML; MG/100ML; MG/100ML; MG/100ML
100 INJECTION, SOLUTION INTRAVENOUS CONTINUOUS
Status: DISCONTINUED | OUTPATIENT
Start: 2025-07-18 | End: 2025-07-18 | Stop reason: HOSPADM

## 2025-07-18 RX ORDER — CYCLOBENZAPRINE HCL 10 MG
10 TABLET ORAL 3 TIMES DAILY PRN
Status: DISCONTINUED | OUTPATIENT
Start: 2025-07-18 | End: 2025-07-19 | Stop reason: HOSPADM

## 2025-07-18 RX ORDER — FLUTICASONE PROPIONATE 50 MCG
2 SPRAY, SUSPENSION (ML) NASAL DAILY
Status: DISCONTINUED | OUTPATIENT
Start: 2025-07-18 | End: 2025-07-19 | Stop reason: HOSPADM

## 2025-07-18 RX ORDER — NORETHINDRONE AND ETHINYL ESTRADIOL 0.5-0.035
KIT ORAL AS NEEDED
Status: DISCONTINUED | OUTPATIENT
Start: 2025-07-18 | End: 2025-07-18

## 2025-07-18 RX ORDER — OXYCODONE HYDROCHLORIDE 10 MG/1
10 TABLET ORAL EVERY 6 HOURS PRN
Status: DISCONTINUED | OUTPATIENT
Start: 2025-07-18 | End: 2025-07-18

## 2025-07-18 RX ORDER — CEFAZOLIN SODIUM 2 G/100ML
2 INJECTION, SOLUTION INTRAVENOUS EVERY 8 HOURS
Status: COMPLETED | OUTPATIENT
Start: 2025-07-18 | End: 2025-07-19

## 2025-07-18 RX ORDER — SODIUM CHLORIDE, SODIUM LACTATE, POTASSIUM CHLORIDE, CALCIUM CHLORIDE 600; 310; 30; 20 MG/100ML; MG/100ML; MG/100ML; MG/100ML
INJECTION, SOLUTION INTRAVENOUS CONTINUOUS PRN
Status: DISCONTINUED | OUTPATIENT
Start: 2025-07-18 | End: 2025-07-18

## 2025-07-18 RX ORDER — ASPIRIN 81 MG/1
81 TABLET ORAL 2 TIMES DAILY
Status: DISCONTINUED | OUTPATIENT
Start: 2025-07-18 | End: 2025-07-19 | Stop reason: HOSPADM

## 2025-07-18 RX ORDER — ONDANSETRON HYDROCHLORIDE 2 MG/ML
INJECTION, SOLUTION INTRAVENOUS AS NEEDED
Status: DISCONTINUED | OUTPATIENT
Start: 2025-07-18 | End: 2025-07-18

## 2025-07-18 RX ORDER — SODIUM CHLORIDE, SODIUM LACTATE, POTASSIUM CHLORIDE, CALCIUM CHLORIDE 600; 310; 30; 20 MG/100ML; MG/100ML; MG/100ML; MG/100ML
50 INJECTION, SOLUTION INTRAVENOUS CONTINUOUS
Status: ACTIVE | OUTPATIENT
Start: 2025-07-18 | End: 2025-07-19

## 2025-07-18 RX ORDER — CEFAZOLIN SODIUM 2 G/100ML
2 INJECTION, SOLUTION INTRAVENOUS ONCE
Status: COMPLETED | OUTPATIENT
Start: 2025-07-18 | End: 2025-07-18

## 2025-07-18 RX ORDER — HYDROMORPHONE HYDROCHLORIDE 1 MG/ML
1 INJECTION, SOLUTION INTRAMUSCULAR; INTRAVENOUS; SUBCUTANEOUS EVERY 5 MIN PRN
Status: DISCONTINUED | OUTPATIENT
Start: 2025-07-18 | End: 2025-07-18 | Stop reason: HOSPADM

## 2025-07-18 RX ADMIN — SODIUM CHLORIDE, POTASSIUM CHLORIDE, SODIUM LACTATE AND CALCIUM CHLORIDE: 600; 310; 30; 20 INJECTION, SOLUTION INTRAVENOUS at 07:49

## 2025-07-18 RX ADMIN — EPHEDRINE SULFATE 5 MG: 50 INJECTION, SOLUTION INTRAVENOUS at 08:41

## 2025-07-18 RX ADMIN — TRANEXAMIC ACID 1950 MG: 650 TABLET ORAL at 07:06

## 2025-07-18 RX ADMIN — FENTANYL CITRATE 25 MCG: 50 INJECTION, SOLUTION INTRAMUSCULAR; INTRAVENOUS at 08:27

## 2025-07-18 RX ADMIN — CEFAZOLIN SODIUM 2 G: 2 INJECTION, SOLUTION INTRAVENOUS at 15:26

## 2025-07-18 RX ADMIN — ONDANSETRON 4 MG: 2 INJECTION INTRAMUSCULAR; INTRAVENOUS at 09:16

## 2025-07-18 RX ADMIN — ACETAMINOPHEN 650 MG: 325 TABLET ORAL at 07:06

## 2025-07-18 RX ADMIN — MIDAZOLAM 2 MG: 1 INJECTION INTRAMUSCULAR; INTRAVENOUS at 07:46

## 2025-07-18 RX ADMIN — CEFAZOLIN SODIUM 2 G: 2 INJECTION, SOLUTION INTRAVENOUS at 08:12

## 2025-07-18 RX ADMIN — METOCLOPRAMIDE 10 MG: 5 INJECTION, SOLUTION INTRAMUSCULAR; INTRAVENOUS at 10:16

## 2025-07-18 RX ADMIN — ASPIRIN 81 MG: 81 TABLET, COATED ORAL at 20:02

## 2025-07-18 RX ADMIN — HYDROMORPHONE HYDROCHLORIDE 0.5 MG: 1 INJECTION, SOLUTION INTRAMUSCULAR; INTRAVENOUS; SUBCUTANEOUS at 20:02

## 2025-07-18 RX ADMIN — BUPIVACAINE HYDROCHLORIDE 1.8 ML: 7.5 INJECTION, SOLUTION EPIDURAL; RETROBULBAR at 08:05

## 2025-07-18 RX ADMIN — Medication 200 MCG: at 08:39

## 2025-07-18 RX ADMIN — PROPOFOL 100 MG: 10 INJECTION, EMULSION INTRAVENOUS at 08:18

## 2025-07-18 RX ADMIN — EPHEDRINE SULFATE 5 MG: 50 INJECTION, SOLUTION INTRAVENOUS at 09:18

## 2025-07-18 RX ADMIN — SODIUM CHLORIDE, POTASSIUM CHLORIDE, SODIUM LACTATE AND CALCIUM CHLORIDE 50 ML/HR: 600; 310; 30; 20 INJECTION, SOLUTION INTRAVENOUS at 13:45

## 2025-07-18 RX ADMIN — PROPOFOL 75 MCG/KG/MIN: 10 INJECTION, EMULSION INTRAVENOUS at 08:09

## 2025-07-18 RX ADMIN — HYDROMORPHONE HYDROCHLORIDE 0.5 MG: 1 INJECTION, SOLUTION INTRAMUSCULAR; INTRAVENOUS; SUBCUTANEOUS at 10:16

## 2025-07-18 RX ADMIN — ROPIVACAINE HYDROCHLORIDE 20 ML: 5 INJECTION, SOLUTION EPIDURAL; INFILTRATION; PERINEURAL at 07:40

## 2025-07-18 RX ADMIN — DEXAMETHASONE SODIUM PHOSPHATE 8 MG: 10 INJECTION INTRAMUSCULAR; INTRAVENOUS at 08:21

## 2025-07-18 RX ADMIN — OXYCODONE HYDROCHLORIDE 5 MG: 5 TABLET ORAL at 13:38

## 2025-07-18 RX ADMIN — PROPOFOL 50 MG: 10 INJECTION, EMULSION INTRAVENOUS at 08:08

## 2025-07-18 RX ADMIN — HYDROMORPHONE HYDROCHLORIDE 0.5 MG: 1 INJECTION, SOLUTION INTRAMUSCULAR; INTRAVENOUS; SUBCUTANEOUS at 15:27

## 2025-07-18 RX ADMIN — PROPOFOL 200 MG: 10 INJECTION, EMULSION INTRAVENOUS at 08:15

## 2025-07-18 RX ADMIN — DOCUSATE SODIUM 100 MG: 100 CAPSULE, LIQUID FILLED ORAL at 20:02

## 2025-07-18 RX ADMIN — CELECOXIB 200 MG: 200 CAPSULE ORAL at 07:06

## 2025-07-18 RX ADMIN — HYDROMORPHONE HYDROCHLORIDE 1 MG: 1 INJECTION, SOLUTION INTRAMUSCULAR; INTRAVENOUS; SUBCUTANEOUS at 09:58

## 2025-07-18 RX ADMIN — Medication 200 MCG: at 08:36

## 2025-07-18 RX ADMIN — POVIDONE-IODINE 1 APPLICATION: 5 SOLUTION TOPICAL at 07:06

## 2025-07-18 RX ADMIN — TRANEXAMIC ACID 1950 MG: 650 TABLET ORAL at 15:27

## 2025-07-18 RX ADMIN — Medication 100 MCG: at 09:06

## 2025-07-18 RX ADMIN — Medication 100 MCG: at 08:21

## 2025-07-18 RX ADMIN — ACETAMINOPHEN 650 MG: 325 TABLET ORAL at 18:37

## 2025-07-18 RX ADMIN — DEXAMETHASONE SODIUM PHOSPHATE 10 MG: 10 INJECTION INTRAMUSCULAR; INTRAVENOUS at 07:40

## 2025-07-18 RX ADMIN — FENTANYL CITRATE 25 MCG: 50 INJECTION, SOLUTION INTRAMUSCULAR; INTRAVENOUS at 09:31

## 2025-07-18 RX ADMIN — OXYCODONE HYDROCHLORIDE 5 MG: 5 TABLET ORAL at 18:37

## 2025-07-18 RX ADMIN — Medication 100 MCG: at 08:28

## 2025-07-18 RX ADMIN — EPHEDRINE SULFATE 5 MG: 50 INJECTION, SOLUTION INTRAVENOUS at 08:50

## 2025-07-18 SDOH — ECONOMIC STABILITY: FOOD INSECURITY: WITHIN THE PAST 12 MONTHS, YOU WORRIED THAT YOUR FOOD WOULD RUN OUT BEFORE YOU GOT THE MONEY TO BUY MORE.: NEVER TRUE

## 2025-07-18 SDOH — SOCIAL STABILITY: SOCIAL INSECURITY
WITHIN THE LAST YEAR, HAVE YOU BEEN KICKED, HIT, SLAPPED, OR OTHERWISE PHYSICALLY HURT BY YOUR PARTNER OR EX-PARTNER?: NO

## 2025-07-18 SDOH — SOCIAL STABILITY: SOCIAL INSECURITY: WERE YOU ABLE TO COMPLETE ALL THE BEHAVIORAL HEALTH SCREENINGS?: YES

## 2025-07-18 SDOH — SOCIAL STABILITY: SOCIAL INSECURITY: HAS ANYONE EVER THREATENED TO HURT YOUR FAMILY OR YOUR PETS?: NO

## 2025-07-18 SDOH — SOCIAL STABILITY: SOCIAL INSECURITY: WITHIN THE LAST YEAR, HAVE YOU BEEN AFRAID OF YOUR PARTNER OR EX-PARTNER?: NO

## 2025-07-18 SDOH — SOCIAL STABILITY: SOCIAL INSECURITY: ARE YOU OR HAVE YOU BEEN THREATENED OR ABUSED PHYSICALLY, EMOTIONALLY, OR SEXUALLY BY ANYONE?: NO

## 2025-07-18 SDOH — SOCIAL STABILITY: SOCIAL INSECURITY: WITHIN THE LAST YEAR, HAVE YOU BEEN HUMILIATED OR EMOTIONALLY ABUSED IN OTHER WAYS BY YOUR PARTNER OR EX-PARTNER?: NO

## 2025-07-18 SDOH — SOCIAL STABILITY: SOCIAL INSECURITY
WITHIN THE LAST YEAR, HAVE YOU BEEN RAPED OR FORCED TO HAVE ANY KIND OF SEXUAL ACTIVITY BY YOUR PARTNER OR EX-PARTNER?: NO

## 2025-07-18 SDOH — ECONOMIC STABILITY: INCOME INSECURITY: IN THE PAST 12 MONTHS HAS THE ELECTRIC, GAS, OIL, OR WATER COMPANY THREATENED TO SHUT OFF SERVICES IN YOUR HOME?: NO

## 2025-07-18 SDOH — SOCIAL STABILITY: SOCIAL INSECURITY: ARE THERE ANY APPARENT SIGNS OF INJURIES/BEHAVIORS THAT COULD BE RELATED TO ABUSE/NEGLECT?: NO

## 2025-07-18 SDOH — ECONOMIC STABILITY: FOOD INSECURITY: WITHIN THE PAST 12 MONTHS, THE FOOD YOU BOUGHT JUST DIDN'T LAST AND YOU DIDN'T HAVE MONEY TO GET MORE.: NEVER TRUE

## 2025-07-18 SDOH — SOCIAL STABILITY: SOCIAL INSECURITY: DO YOU FEEL UNSAFE GOING BACK TO THE PLACE WHERE YOU ARE LIVING?: NO

## 2025-07-18 SDOH — SOCIAL STABILITY: SOCIAL INSECURITY: ABUSE: ADULT

## 2025-07-18 SDOH — SOCIAL STABILITY: SOCIAL INSECURITY: HAVE YOU HAD THOUGHTS OF HARMING ANYONE ELSE?: NO

## 2025-07-18 SDOH — SOCIAL STABILITY: SOCIAL INSECURITY: DOES ANYONE TRY TO KEEP YOU FROM HAVING/CONTACTING OTHER FRIENDS OR DOING THINGS OUTSIDE YOUR HOME?: NO

## 2025-07-18 SDOH — SOCIAL STABILITY: SOCIAL INSECURITY: DO YOU FEEL ANYONE HAS EXPLOITED OR TAKEN ADVANTAGE OF YOU FINANCIALLY OR OF YOUR PERSONAL PROPERTY?: NO

## 2025-07-18 SDOH — SOCIAL STABILITY: SOCIAL INSECURITY: HAVE YOU HAD ANY THOUGHTS OF HARMING ANYONE ELSE?: NO

## 2025-07-18 SDOH — HEALTH STABILITY: MENTAL HEALTH: CURRENT SMOKER: 0

## 2025-07-18 ASSESSMENT — COGNITIVE AND FUNCTIONAL STATUS - GENERAL
MOBILITY SCORE: 19
DAILY ACTIVITIY SCORE: 22
MOVING TO AND FROM BED TO CHAIR: A LITTLE
WALKING IN HOSPITAL ROOM: A LITTLE
MOVING TO AND FROM BED TO CHAIR: A LITTLE
STANDING UP FROM CHAIR USING ARMS: A LITTLE
WALKING IN HOSPITAL ROOM: A LITTLE
TOILETING: A LITTLE
STANDING UP FROM CHAIR USING ARMS: A LITTLE
WALKING IN HOSPITAL ROOM: A LITTLE
DRESSING REGULAR LOWER BODY CLOTHING: A LITTLE
CLIMB 3 TO 5 STEPS WITH RAILING: A LOT
DAILY ACTIVITIY SCORE: 20
PATIENT BASELINE BEDBOUND: NO
MOVING FROM LYING ON BACK TO SITTING ON SIDE OF FLAT BED WITH BEDRAILS: A LITTLE
HELP NEEDED FOR BATHING: A LITTLE
DRESSING REGULAR UPPER BODY CLOTHING: A LITTLE
DRESSING REGULAR LOWER BODY CLOTHING: A LITTLE
MOVING TO AND FROM BED TO CHAIR: A LITTLE
CLIMB 3 TO 5 STEPS WITH RAILING: A LITTLE
TOILETING: A LITTLE
TURNING FROM BACK TO SIDE WHILE IN FLAT BAD: A LITTLE
CLIMB 3 TO 5 STEPS WITH RAILING: A LITTLE
MOBILITY SCORE: 21
MOBILITY SCORE: 18

## 2025-07-18 ASSESSMENT — PAIN DESCRIPTION - DESCRIPTORS
DESCRIPTORS: ACHING;SORE
DESCRIPTORS: ACHING;SORE

## 2025-07-18 ASSESSMENT — PATIENT HEALTH QUESTIONNAIRE - PHQ9
2. FEELING DOWN, DEPRESSED OR HOPELESS: NOT AT ALL
SUM OF ALL RESPONSES TO PHQ9 QUESTIONS 1 & 2: 0
1. LITTLE INTEREST OR PLEASURE IN DOING THINGS: NOT AT ALL

## 2025-07-18 ASSESSMENT — ACTIVITIES OF DAILY LIVING (ADL)
ADL_ASSISTANCE: INDEPENDENT
DRESSING YOURSELF: NEEDS ASSISTANCE
JUDGMENT_ADEQUATE_SAFELY_COMPLETE_DAILY_ACTIVITIES: YES
LACK_OF_TRANSPORTATION: NO
ADEQUATE_TO_COMPLETE_ADL: YES
BATHING: INDEPENDENT
PATIENT'S MEMORY ADEQUATE TO SAFELY COMPLETE DAILY ACTIVITIES?: YES
GROOMING: INDEPENDENT
HEARING - LEFT EAR: FUNCTIONAL
WALKS IN HOME: NEEDS ASSISTANCE
FEEDING YOURSELF: INDEPENDENT
TOILETING: NEEDS ASSISTANCE
HEARING - RIGHT EAR: FUNCTIONAL

## 2025-07-18 ASSESSMENT — PAIN - FUNCTIONAL ASSESSMENT
PAIN_FUNCTIONAL_ASSESSMENT: 0-10
PAIN_FUNCTIONAL_ASSESSMENT: UNABLE TO SELF-REPORT
PAIN_FUNCTIONAL_ASSESSMENT: 0-10

## 2025-07-18 ASSESSMENT — LIFESTYLE VARIABLES
HOW OFTEN DO YOU HAVE A DRINK CONTAINING ALCOHOL: NEVER
SKIP TO QUESTIONS 9-10: 1
AUDIT-C TOTAL SCORE: 0
AUDIT-C TOTAL SCORE: 0
HOW MANY STANDARD DRINKS CONTAINING ALCOHOL DO YOU HAVE ON A TYPICAL DAY: PATIENT DOES NOT DRINK
HOW OFTEN DO YOU HAVE 6 OR MORE DRINKS ON ONE OCCASION: NEVER

## 2025-07-18 ASSESSMENT — PAIN DESCRIPTION - LOCATION
LOCATION: KNEE

## 2025-07-18 ASSESSMENT — PAIN SCALES - GENERAL
PAINLEVEL_OUTOF10: 3
PAINLEVEL_OUTOF10: 8
PAINLEVEL_OUTOF10: 4
PAINLEVEL_OUTOF10: 4
PAINLEVEL_OUTOF10: 3
PAINLEVEL_OUTOF10: 7
PAINLEVEL_OUTOF10: 5 - MODERATE PAIN
PAINLEVEL_OUTOF10: 4
PAINLEVEL_OUTOF10: 8
PAINLEVEL_OUTOF10: 7
PAINLEVEL_OUTOF10: 7
PAINLEVEL_OUTOF10: 4
PAINLEVEL_OUTOF10: 3
PAINLEVEL_OUTOF10: 3

## 2025-07-18 ASSESSMENT — PAIN DESCRIPTION - ORIENTATION
ORIENTATION: LEFT

## 2025-07-18 NOTE — ANESTHESIA PROCEDURE NOTES
Airway  Date/Time: 7/18/2025 8:17 AM  Reason: elective    Airway not difficult    Staffing  Performed: CAA   Authorized by: Luana Garcia MD    Performed by: BELLA Membreno  Patient location during procedure: OR    Patient Condition  Indications for airway management: anesthesia  Sedation level: deep     Final Airway Details   Preoxygenated: yes  Final airway type: supraglottic airway  Successful airway: Supraglottic airway: igel.  Size: 4  Number of attempts at approach: 1

## 2025-07-18 NOTE — ANESTHESIA PROCEDURE NOTES
Spinal Block    Patient location during procedure: OR  Start time: 7/18/2025 7:55 AM  End time: 7/18/2025 8:05 AM  Reason for block: primary anesthetic  Staffing  Performed: BELLA, yousif and JOAQUÍN   Authorized by: Luana Garcia MD    Performed by: BELLA Membreno    Preanesthetic Checklist  Completed: patient identified, IV checked, risks and benefits discussed, surgical consent, monitors and equipment checked, pre-op evaluation, timeout performed and sterile techniques followed  Block Timeout  RN/Licensed healthcare professional reads aloud to the Anesthesia provider and entire team: Patient identity, procedure with side and site, patient position, and as applicable the availability of implants/special equipment/special requirements.  Patient on coagulant treatment: no  Timeout performed at: 7/18/2025 7:51 AM  Spinal Block  Patient position: sitting  Prep: ChloraPrep  Sterility prep: cap, drape, gloves, hand hygiene and mask  Sedation level: general anesthesia  Patient monitoring: blood pressure, continuous pulse oximetry, EKG, ETCO2 and heart rate  Approach: midline  Vertebral space: L3-4  Injection technique: single-shot  Needle  Needle type: Quincke   Needle gauge: 25 G  Needle length: 5 in  Free flowing CSF: yes    Assessment  Sensory level: T4 bilateral  Block outcome: patient comfortable  Events: failed spinal  Procedure assessment: patient sedated but conversant throughout procedure and patient tolerated procedure well with no immediate complications  Additional Notes  CSF aspirated, patient still able to feel and move, LMA placed

## 2025-07-18 NOTE — ANESTHESIA PREPROCEDURE EVALUATION
Patient: Robbie Tovar    Procedure Information       Anesthesia Start Date/Time: 07/18/25 0751    Procedure: LEFT KNEE MEDIAL UNICOMPARTMENTAL ARTHROPLASTY (Left: Knee) - 23HR OBSERVATIION    Location: [facility] OR 02 / Virtual [facility] OR    Surgeons: Kris Whalen MD            Relevant Problems   Cardiac   (+) Benign essential HTN      Neuro   (+) Cervical radiculopathy      GI   (+) GERD (gastroesophageal reflux disease)      Endocrine   (+) Obesity      Hematology   (+) Long term (current) use of anticoagulants      Musculoskeletal   (+) Chronic left-sided low back pain with left-sided sciatica   (+) OA (osteoarthritis) of knee   (+) Unilateral primary osteoarthritis, left knee      HEENT   (+) Chronic sinusitis   (+) Seasonal allergies      ID   (+) Disease due to severe acute respiratory syndrome coronavirus 2 (SARS-CoV-2)   (+) Pityriasis versicolor      Skin   (+) Basal cell carcinoma of skin of other part of trunk   (+) Pityriasis versicolor       Clinical information reviewed:   Tobacco  Allergies  Meds   Med Hx  Surg Hx   Fam Hx  Soc Hx        NPO Detail:  NPO/Void Status  Carbohydrate Drink Given Prior to Surgery? : N  Date of Last Liquid: 07/18/25  Time of Last Liquid: 0715  Date of Last Solid: 07/17/25  Time of Last Solid: 1900  Last Intake Type: Clear fluids  Time of Last Void: 0720         Physical Exam    Airway  Mallampati: II  TM distance: >3 FB  Neck ROM: full  Mouth opening: 3 or more finger widths     Cardiovascular   Rhythm: regular  Rate: normal     Dental - normal exam     Pulmonary Breath sounds clear to auscultation     Abdominal - normal exam           Anesthesia Plan    History of general anesthesia?: yes  History of complications of general anesthesia?: no    ASA 2     spinal and regional     The patient is not a current smoker.    intravenous induction   Anesthetic plan and risks discussed with patient.    Plan discussed with CAA.

## 2025-07-18 NOTE — ANESTHESIA POSTPROCEDURE EVALUATION
Patient: Robbie Tovar    Procedure Summary       Date: 07/18/25 Room / Location: [facility] OR 02 / [facility] OR    Anesthesia Start: 0751 Anesthesia Stop: 0939    Procedure: LEFT KNEE MEDIAL UNICOMPARTMENTAL ARTHROPLASTY (Left: Knee) Diagnosis:       Unilateral primary osteoarthritis, left knee      (Unilateral primary osteoarthritis, left knee [M17.12])    Surgeons: Kris Whalen MD Responsible Provider: Luana Garcia MD    Anesthesia Type: regional, spinal, MAC ASA Status: 2            Anesthesia Type: regional, spinal, MAC    Vitals Value Taken Time   /79 07/18/25 10:45   Temp 36.3 °C (97.3 °F) 07/18/25 10:45   Pulse 64 07/18/25 10:45   Resp 12 07/18/25 10:45   SpO2 96 % 07/18/25 10:45       Anesthesia Post Evaluation    Patient location during evaluation: PACU  Patient participation: complete - patient participated  Level of consciousness: awake and alert  Pain management: satisfactory to patient  Multimodal analgesia pain management approach  Airway patency: patent  Cardiovascular status: hemodynamically stable  Respiratory status: acceptable  Hydration status: acceptable  Postoperative Nausea and Vomiting: none        No notable events documented.

## 2025-07-18 NOTE — ANESTHESIA PROCEDURE NOTES
Peripheral Block    Patient location during procedure: pre-op  Medication administered at: 7/18/2025 7:40 AM  End time: 7/18/2025 7:44 AM  Reason for block: at surgeon's request and post-op pain management  Staffing  Performed: attending   Authorized by: Luana Garcia MD    Performed by: Luana Garcia MD  Preanesthetic Checklist  Completed: patient identified, IV checked, site marked, risks and benefits discussed, surgical consent, monitors and equipment checked, pre-op evaluation and timeout performed   Timeout performed at: 7/18/2025 7:37 AM  Peripheral Block  Patient position: laying flat  Prep: ChloraPrep  Patient monitoring: heart rate and continuous pulse ox  Block type: adductor canal  Laterality: left  Injection technique: single-shot  Local infiltration: ropivacaine  Infiltration strength: 0.5 %  Dose: 20 mL  Needle  Needle gauge: 22 G  Needle length: 8 cm  Needle localization: anatomical landmarks and ultrasound guidance  Assessment  Injection assessment: negative aspiration for heme, no paresthesia on injection, incremental injection and local visualized surrounding nerve on ultrasound  Paresthesia pain: none  Heart rate change: no  Slow fractionated injection: yes  Additional Notes  Risks and benefits of the procedure have been extensively discussed. Patient seems to understand and is willing to proceed.   Ultrasound probe at mid thigh. Relevant anatomy recognized. Lidocaine 2% to skin. Pajunk SonoBlock needle introduced and advanced in-plain, from the lateral to medial. Saphenous nerve  approached at nine o'clock. Needle tip placed immediately adjacent to the arterial wall. Negative aspiration confirmed twice prior to the  initial and then repeated prior to each incremental injection.   10 mg of PF Dexamethasone were mixed with the local anesthetic.   Tip of the needle remained visible throughout the entire procedure. Appropriate spread of the local anesthetic mixture has been observed and  documented. No immediate complications.

## 2025-07-18 NOTE — PROGRESS NOTES
Physical Therapy    Physical Therapy Evaluation & Treatment    Patient Name: Robbie Tovar  MRN: [id number]  Today's Date: 7/18/2025   Time Calculation  Start Time: 1424  Stop Time: 1456  Time Calculation (min): 32 min  4120/4120-A    Assessment/Plan   PT Assessment  PT Assessment Results: Decreased strength, Decreased range of motion, Decreased endurance, Impaired balance, Decreased mobility, Decreased safety awareness, Pain  Rehab Prognosis: Good  Evaluation/Treatment Tolerance: Patient tolerated treatment well  Medical Staff Made Aware: Yes  End of Session Communication: Bedside nurse  Assessment Comment: Pt is a 69 y/o male now s/p L TKA with Dr. Whalen 7/18. Pt presents with left knee pain, decreased strength, ROM and balance Pt able to tolerate bed mobility, transfers and gait training this date. He is functioning below baseline level of function and will benefit from skilled therapy during stay to improve overall functional mobility and safety awareness. Upon discharge pt will benefit from low intensity therapy for continued improvement in functional mobility.     End of Session Patient Position: Up in chair, Alarm on (call light within reach and SO present)  IP OR SWING BED PT PLAN  Inpatient or Swing Bed: Inpatient  PT Plan  Treatment/Interventions: Bed mobility, Transfer training, Gait training, Stair training, Balance training, Neuromuscular re-education, Endurance training, Strengthening, Range of motion, Therapeutic exercise, Therapeutic activity, Home exercise program, Positioning, Postural re-education  PT Plan: Ongoing PT  PT Frequency: BID  PT Discharge Recommendations: Low intensity level of continued care  Equipment Recommended upon Discharge: Wheeled walker  PT Recommended Transfer Status: Assist x1, Assistive device, Contact guard  PT - OK to Discharge: Yes (Once medically cleared)    Current Problem:  Problem List[1]    Subjective     General Visit Information:  General  Reason for Referral: P/w L  knee OA. Pt now s/p L TKA with Dr. Whalen 7/18.  Referred By: Dr. Whalen  Past Medical History Relevant to Rehab: L knee OA, GERD, cervical radiculopathy  Family/Caregiver Present: Yes  Caregiver Feedback: SO present who was helpful and supportive  Prior to Session Communication: Bedside nurse  Patient Position Received: Bed, 3 rail up, Alarm on  Preferred Learning Style: verbal  General Comment: Pt pleasant and agreeable to work with therapy.    Home Living:  Home Living  Home Living Comments: Pt lives alone in a ranch style house with 1 SILVANO to enter. Laundry in basement with 6 SILVANO down with HR. First floor set up with tub shower, no DME. Pt reports he is able to get a shower seat. Pt has a raised toilet seat with rails. Pt independent with mobility, ADLs and IADLs. Owns a RW and FWW. SO will be assisting pt upon discharge.    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)  Prior Function Comments: (+) drives, (-) falls    Precautions:  Precautions  LE Weight Bearing Status: Weight Bearing as Tolerated (LLE)  Medical Precautions: Fall precautions  Post-Surgical Precautions: Left total knee precautions  Precautions Comment: Pt educated on general knee precautions throughout session with functional mobility tasks    Objective     Pain:  Pain Assessment  Pain Assessment: 0-10  0-10 (Numeric) Pain Score: 4  Pain Type: Surgical pain  Pain Location: Knee  Pain Orientation: Left  Pain Interventions: Medication (See MAR), Cold applied, Repositioned, Elevated    Cognition:  Cognition  Overall Cognitive Status: Within Functional Limits  Orientation Level: Oriented X4  Attention: Within Functional  Limits  Insight: Within function limits    General Assessments:      Activity Tolerance  Endurance: Endurance does not limit participation in activity    Sensation  Light Touch: No apparent deficits  Sensation Comment: Pt denies any n/t.     Static Sitting Balance  Static Sitting-Balance Support: Bilateral upper extremity supported, Feet supported  Static Sitting-Level of Assistance: Close supervision  Static Standing Balance  Static Standing-Balance Support: Bilateral upper extremity supported  Static Standing-Level of Assistance: Contact guard  Dynamic Standing Balance  Dynamic Standing-Balance Support: Bilateral upper extremity supported  Dynamic Standing-Level of Assistance: Contact guard    Functional Assessments:     Bed Mobility  Bed Mobility: Yes  Bed Mobility 1  Bed Mobility 1: Supine to sitting, Scooting  Level of Assistance 1: Independent    Transfers  Transfer: Yes  Transfer 1  Transfer From 1: Bed to  Transfer to 1: Stand  Technique 1: Sit to stand  Transfer Device 1: Walker, Gait belt  Transfer Level of Assistance 1: Contact guard, Minimal verbal cues  Trials/Comments 1: Bed elevated for height purposes. VCs for proper hand placement and body mechanics.  Transfers 2  Transfer From 2: Stand to  Transfer to 2: Chair with arms  Technique 2: Stand to sit  Transfer Device 2: Walker, Gait belt  Transfer Level of Assistance 2: Contact guard, Minimal verbal cues  Trials/Comments 2: VCs for proper hand placement, body mechanics and positioning of FWW in relation to chair    Ambulation/Gait Training  Ambulation/Gait Training Performed: Yes  Ambulation/Gait Training 1  Surface 1: Level tile  Device 1: Rolling walker  Gait Support Devices: Gait belt  Assistance 1: Contact guard, Minimal verbal cues  Comments/Distance (ft) 1: ~250 ft with reciprocal gait pattern and chair follow, x2 static standing rest breaks; demos good stability and heel to toe gait pattern; VCs for safety awareness with FWW and to avoid  twisting/pivoting on LLE with directional turns     Extremity/Trunk Assessments:  RUE   RUE : Within Functional Limits  LUE   LUE: Within Functional Limits  RLE   RLE : Within Functional Limits  LLE   LLE :  (Knee AROM/strength limited 2/2 recent surgery, atlhough strength WFL based on functional mobility.)    Treatments:  Pt able to tolerate the following activities during today's treatment session. Pt instructed/educated throughout the session on safety awareness, body mechanics and proper hand placement. Pt able to ambulate ~250 ft with FWW/gait belt CGA with reciprocal gait pattern and good stability. Verbal cues for safety awareness with FWW, gait mechanics and knee precautions. X2 static standing rest breaks with gait training.     There-ex supine:  AP x20 BL, SLR x5 LLE, QS x12 BL, GS x10, pt educated on HEP consisting on various supine/seated exercises with emphasis on surgical extremity (LLE) for improved LE strength/ROM    Outcome Measures:  [facility] Basic Mobility  Turning from your back to your side while in a flat bed without using bedrails: None  Moving from lying on your back to sitting on the side of a flat bed without using bedrails: None  Moving to and from bed to chair (including a wheelchair): A little  Standing up from a chair using your arms (e.g. wheelchair or bedside chair): A little  To walk in hospital room: A little  Climbing 3-5 steps with railing: A lot  Basic Mobility - Total Score: 19     Goals:  Encounter Problems       Encounter Problems (Active)       PT Problem       Pt will be able to ambulate >/= 300 ft with FWW SBA with good safety awareness and stability.        Start:  07/18/25    Expected End:  08/01/25            Pt will be able to complete all transfers with FWW mod I demonstrating good safety awareness and proper body mechanics.        Start:  07/18/25    Expected End:  08/01/25            Pt will complete supine, seated and standing exercises to maintain/improve overall  strength with minimal verbal cues.         Start:  07/18/25    Expected End:  08/01/25            Pt will be able to negotiate ascending/descending >/= 4 SILVANO with use of unilateral HR SBA with sufficient foot clearance and good safety awareness for safe home going.         Start:  07/18/25    Expected End:  08/01/25                 Education Documentation  Precautions, taught by Shae Aguirre PT at 7/18/2025  3:10 PM.  Learner: Significant Other, Patient  Readiness: Acceptance  Method: Explanation  Response: Verbalizes Understanding, Demonstrated Understanding, Needs Reinforcement    Body Mechanics, taught by Shae Aguirre PT at 7/18/2025  3:10 PM.  Learner: Significant Other, Patient  Readiness: Acceptance  Method: Explanation  Response: Verbalizes Understanding, Demonstrated Understanding, Needs Reinforcement    Home Exercise Program, taught by Shae Aguirre PT at 7/18/2025  3:10 PM.  Learner: Significant Other, Patient  Readiness: Acceptance  Method: Explanation  Response: Verbalizes Understanding, Demonstrated Understanding, Needs Reinforcement    Mobility Training, taught by Shae Aguirre PT at 7/18/2025  3:10 PM.  Learner: Significant Other, Patient  Readiness: Acceptance  Method: Explanation  Response: Verbalizes Understanding, Demonstrated Understanding, Needs Reinforcement    Education Comments  No comments found.              [1]   Patient Active Problem List  Diagnosis    Long term (current) use of anticoagulants    Presence of right artificial knee joint    Allergic rhinitis    Arthritis of knee, left    Arthritis of knee, right    Basal cell carcinoma of skin of other part of trunk    Benign essential HTN    Cervical radiculopathy    Chronic left-sided low back pain with left-sided sciatica    Chronic sinusitis    Diseases of lips    GERD (gastroesophageal reflux disease)    Insomnia    Keratosis, inflamed seborrheic    Loss of smell    Lumbar spondylosis    Lymphedema of leg     Nasal crusting    Nasal drainage    Neoplasm of uncertain behavior of skin    Arthrofibrosis of total knee arthroplasty, sequela    Decreased range of motion of right knee    OA (osteoarthritis) of knee    Obesity    Hypertrophic condition of skin    Facial pressure    Paresthesia    Personal history of other malignant neoplasm of skin    Petechiae    Pityriasis versicolor    Plantar fasciitis    Pre-diabetes    Seasonal allergies    Vitamin D deficiency    Cervicalgia    Impaired functional mobility, balance, and endurance    Sacroiliitis    Acquired leg length discrepancy    Abdominal pain    Acute bronchitis    Cough    Disease due to severe acute respiratory syndrome coronavirus 2 (SARS-CoV-2)    Impacted cerumen of left ear    Knee pain    Nasal congestion    Pain of hand    Spondylolysis    EKC (epidemic keratoconjunctivitis)    Scleritis, right    Hyperopia with presbyopia of both eyes    Unilateral primary osteoarthritis, left knee

## 2025-07-18 NOTE — OP NOTE
Operative Note     Date: 2025  OR Location: [facility] OR    Name: Robbie Tovar, : 1955, Age: 70 y.o., MRN: [id number], Sex: male    Diagnosis  Pre-op Diagnosis      * Unilateral primary osteoarthritis, left knee [M17.12] Post-op Diagnosis     * Unilateral primary osteoarthritis, left knee [M17.12]       Procedures  LEFT KNEE MEDIAL UNICOMPARTMENTAL ARTHROPLASTY  27956 - [place] ARTHRP KNEE CONDYLE&PLATEAU MEDIAL/LAT CMPRT  COMPUTER-ASSISTED SURGERY    Surgeons      * Kris Whalen - Primary    Resident/Fellow/Other Assistant:  Surgeons and Role:     * Luis Pringle PA-C - Assisting    Staff:   Surgical Assistant:   Circulator: Chauncey Brunner Person: Georgina Brunner Person: Shaneka  Circulator: Edi    Anesthesia Staff: Anesthesiologist: Luana Garcia MD  C-AA: BELLA Membreno    Procedure Summary  Anesthesia: Anesthesia type not filed in the log.  ASA: ASA status not filed in the log.  Estimated Blood Loss: 50 mL  Intra-op Medications:   Administrations occurring from 0800 to 1000 on 25:   Medication Name Total Dose   ropivacaine-epinephrine-clonidine-ketorolac 2.46-0.005- 0.0008-0.3mg/mL periarticular syringe 50 mL   bupivacaine PF (Marcaine) 0.75 % 1.8 mL   dexAMETHasone (Decadron) PF injection 10 mg/mL 8 mg   ePHEDrine injection 15 mg   fentaNYL (Sublimaze) injection 50 mcg/mL 25 mcg   ondansetron (Zofran) 2 mg/mL injection 4 mg   phenylephrine 100 mcg/mL syringe 10 mL (prefilled) 700 mcg   propofol (Diprivan) injection 10 mg/mL 414.2 mg   ceFAZolin (Ancef) 2 g in dextrose (iso)  mL 2 g              Anesthesia Record               Intraprocedure I/O Totals       None           Specimen: No specimens collected                Implants:  Fabio restoris femur 6, tibia 6, articular surface 8  Implants       Type Name Action Serial No.      Joint Knee CEMENT, BONE, BIOMET R, 1X40 - [id number] Implanted      Joint BASEPLATE, TIBIAL, RESTORIS, MCK, LM-RL, SZ 6 - [id number] - [id number] Implanted  [id number]     Joint Knee FEMORAL, RESTORIS, MCK, LM-RL, SZ 6 - [id number] - [id number] Implanted [id number]     Joint Knee MIKE X3 UNI ONLAY TIBIAL INSERT SHER 6 THKNS 8MM Implanted N/A              Findings: see procedure details    Indications:     The patient was seen in the preoperative area. The risks, benefits, complications, treatment options, non-operative alternatives, expected recovery and outcomes were discussed with the patient. The possibilities of reaction to medication, pulmonary aspiration, injury to surrounding structures, bleeding, recurrent infection, the need for additional procedures, failure to diagnose a condition, and creating a complication requiring transfusion or operation were discussed with the patient. The patient concurred with the proposed plan, giving informed consent.  The site of surgery was properly noted/marked if necessary per policy. The patient has been actively warmed in preoperative area. Preoperative antibiotics have been ordered and given within 1 hours of incision. Venous thrombosis prophylaxis have been ordered.    The patient failed multiple attempts at non-surgical management.  Specific to unicompartmental arthroplasty we discussed the risks of infection / revision surgery, DVT/PE, medical complications, stiffness / loss of motion, neurologic (foot drop) or vascular injury.    Procedure Details:   Patient was met prior to surgery in pre-operative holding.  The appropriate extremity was marked and recent health status was reviewed and we found no contraindication to proceeding with the scheduled procedure.  We again reviewed the risks of infection, wound issues, deep venous thrombosis, pulmonary embolism, medical complications including cardiac and pulmonary, neurologic and vascular injury and incomplete relief of pre-operative pain.     The patient was transported to the operating room.  A thigh tourniquet was placed and a small bump under the buttock.  The  patient was resting comfortably in a supine position with all shirley prominences well padded.  Sequential compression device was placed on the contralateral lower extremity.  An exam under anesthesia was performed to evaluate the patient´s range of motion and ligamentous integrity.       The patient was prepped and draped in the usual sterile fashion.  The leg was placed in a leg hopkins after appropriate padding.  After prep, drape, antibiotic and time out, the leg was exsanguinated and the tourniquet inflated.  A longitudinal incision was made over the anterior knee, slightly medial to midline.  The dissection was carried out down through the skin and subcutaneous tissue.  A medial parapatellar arthrotomy was performed.  We confirmed the patient was a candidate for a unicompartmental knee with intact ACL and wear isolated to the medial compartment.  Minimal fat pad was resected and a retractor was placed medially to protect the MCL.     We then placed two pins distal to the medial joint line in the tibia directed in a posterolateral direction.  We placed two pins proximal and medial in the femur.  The pins were placed intra-incisional.  Two arrays were placed and the registration and check points were completed.     The checkpoints were then registered on the femur and the tibia.  Once this was complete we used the spacers, morrell elevator and manual stress to confirm ideal balancing and resection.  Once this was completed we adjusted the resection and rotation to match our pre-operative plan.     The robot was draped in sterile fashion and brought into the field.  The saw was registered and check points confirmed.  The saw was used to resect the tibia and distal femur.  The emmanuelle was exchanged and registered.  The emmanuelle was used for the distal femur and champfer cut.  The  holes were drilled in the femur and tibia.  Retractors were used to protect the MCL and patellar tendon.     After the tibial cut was  completed, we removed the remaining menisci.  Trials were placed and had an excellent fit.   The knee was taken through a range of motion and both flexion and extension were slightly tight, 1 mm additional bone resected.  There was no laxity to coronal stress. The final poly was 8 mm.    The capsule was injected with long acting local anesthetic.     The cement was mixed in a vacuum sealed fashion and the bone was pulsatile lavaged.   The bone was dried and the implants were placed. The excess cement was removed.   After the cement dried the gap balancing was reassessed prior to placing the final articular surface.  The knee was then copiously lavaged with normal saline.  The tourniquet was taken down and hemostasis was obtained using Bovie electrocautery and tranexamic acid (per protocol).  No significant bleeders were encountered and the usage of a drain was not felt to be necessary.     A layered closure was performed using 1 Vicryl in the retinacular layer and quadriceps tendon oversewn with # 1 Stratafix PDS.  2-0 vicryl in the deep dermal layer and  3-0 monocryl in the skin.  An adherent, water proof dressing was placed followed by Webril and an ace bandage. All counts were reported as correct.  The patient was stable to the post anesthesia care unit.     The surgery was completed with the assistance of a PA.  I was present for all portions of the procedure from opening/exposure through deep wound closure.  The superficial layer was closed indirectly by the PA. No qualified resident or fellow was available to assist.     The physician assistant was present for the entire case.  Given the nature of the procedure and disease process, a skilled surgical assistant was necessary for the case.  The assistant was necessary for retraction and helped directly facilitate completion of the surgery.  A certified scrub tech was at the back table managing instruments and supplies for the surgical  procedure.    Complications:  None; patient tolerated the procedure well.    Disposition: PACU - hemodynamically stable.  Condition: stable         Kris Whalen  Phone Number: [phone]

## 2025-07-18 NOTE — DISCHARGE INSTRUCTIONS
"    Partial Knee Replacement Discharge Instructions:   Dr. Kris Whalen    Physical Therapy / Range of Motion:     Physical therapy should be done daily after surgery.     -  Therapy will commonly begin at home and progress to an outpatient setting (ideally around 2 weeks from surgery).  Despite the convenience of home therapy, Dr. Whalen recommends starting outpatient therapy as soon as possible after surgery.      -  If you find that you are not receiving the appropriate amount of therapy (either at home or at a facility) please call our office immediately.  Therapy CANNOT be postponed or delayed!          !!! Knee replacements should ideally achieve 90 degrees of flexion by 2 weeks from surgery.  Ask your therapist after each session \"What is my RANGE OF MOTION?\"  Your physician will be asking you this question at your follow up appointments.  It is also important to work on getting the knee straight and at rest attempt to keep the knee as straight as possible.    Discharge to rehab:  If you are discharged to a SNF or rehabilitation facility, discharge to home is determined by the specific staff at the facility when they deem you are safe to return home.  Your surgeon and his staff are not allowed to make this decision.  Please inquire with the facility therapist, , and medical personnel.      Medications:   You have been prescribed medication to prevent blood clots (either aspirin or a stronger blood thinner). This medication should be continued for 4 weeks from surgery or longer if on blood thinners prior.   - In addition please wear the MIRELLA hose stockings that you were given to help prevent blood clots for 2-4 weeks postoperatively, and perform calf pumps when seated.    2.    A prescription will be given to you upon discharge for a Pain Medicine.  It is common to take your pain medication one hour before scheduled physical therapy. This will allow you to achieve the necessary goals.   - If you " need a refill, please notify the office at LEAST 48 hours before you will be out of your medication.  Pain medications cannot be called in to a pharmacy and require authentication that can only be done during normal business hours (8-5 on Mon-Fri).   We cannot refill pain medications on the weekend.    3.    Prior to dental work in the future an antibiotic will be prescribed.    4.    A stool softener (Colace - over the counter - ) should be taken with the pain medication     You may receive:    5.    A muscle relaxer (Flexeril) is commonly prescribed.  This can cause some drowsiness which may assist sleeping.    6.   A medication for nausea (Zofran, etc)      Wound Care:  Leave waterproof dressing in place.  As long as the dressing is intact and occlusive at the borders, you may shower.   - Remove the dressing at 7 days post-op.    - You may reinforce with other dressing materials as needed (gauze, ACE wraps, etc.).  If drainage is noted, please contact us.    You may shower allowing water to run over the incision site when the dressing is removed if the wound is dry.  If there is any drainage contact us after dressing removal and keep dry.    - DO NOT RUB OR SCRUB INCISION. NO SOAKING IN A TUB OR STANDING WATER UNTIL INCISION IS WELL-HEALED AND SCABS HAVE DISAPPEARED.    -Do not apply any lotions, creams, ointments, peroxide, betadine or gels to incision and surrounding area.          -Apply ice to affected area as tolerated.    -Bruising is common but if any signs of infection (redness, significant warmth, drainage) please contact us    Driving:  Must be off of narcotic pain medication and have good leg control!  Approximately right le-6 weeks and left le weeks.  Please discuss with Dr. Whalen at first post-op visit prior to resuming.     Follow-up Appointments:   Your post op appointment is ~ 3 weeks from surgery.  Please call the office if there is any uncertainty your post-op appointments.     Prior,  during, and after your surgery and hospital stay, please do not hesitate to call our office with any questions or concerns.    Our staff will be happy to assist you in any possible way during your personal journey through joint replacement.     [phone]

## 2025-07-19 ENCOUNTER — HOME HEALTH ADMISSION (OUTPATIENT)
Dept: HOME HEALTH SERVICES | Facility: HOME HEALTH | Age: 70
End: 2025-07-19
Payer: MEDICARE

## 2025-07-19 ENCOUNTER — APPOINTMENT (OUTPATIENT)
Dept: OPHTHALMOLOGY | Facility: CLINIC | Age: 70
End: 2025-07-19
Payer: COMMERCIAL

## 2025-07-19 ENCOUNTER — DOCUMENTATION (OUTPATIENT)
Dept: HOME HEALTH SERVICES | Facility: HOME HEALTH | Age: 70
End: 2025-07-19

## 2025-07-19 VITALS
OXYGEN SATURATION: 96 % | DIASTOLIC BLOOD PRESSURE: 82 MMHG | RESPIRATION RATE: 18 BRPM | HEART RATE: 66 BPM | BODY MASS INDEX: 31.26 KG/M2 | TEMPERATURE: 97.9 F | SYSTOLIC BLOOD PRESSURE: 149 MMHG | HEIGHT: 73 IN | WEIGHT: 235.89 LBS

## 2025-07-19 LAB
ANION GAP SERPL CALC-SCNC: 13 MMOL/L (ref 10–20)
BUN SERPL-MCNC: 19 MG/DL (ref 6–23)
CALCIUM SERPL-MCNC: 8.4 MG/DL (ref 8.6–10.3)
CHLORIDE SERPL-SCNC: 103 MMOL/L (ref 98–107)
CO2 SERPL-SCNC: 22 MMOL/L (ref 21–32)
CREAT SERPL-MCNC: 0.75 MG/DL (ref 0.5–1.3)
EGFRCR SERPLBLD CKD-EPI 2021: >90 ML/MIN/1.73M*2
ERYTHROCYTE [DISTWIDTH] IN BLOOD BY AUTOMATED COUNT: 12.5 % (ref 11.5–14.5)
GLUCOSE SERPL-MCNC: 122 MG/DL (ref 74–99)
HCT VFR BLD AUTO: 39 % (ref 41–52)
HGB BLD-MCNC: 13 G/DL (ref 13.5–17.5)
MCH RBC QN AUTO: 29 PG (ref 26–34)
MCHC RBC AUTO-ENTMCNC: 33.3 G/DL (ref 32–36)
MCV RBC AUTO: 87 FL (ref 80–100)
NRBC BLD-RTO: 0 /100 WBCS (ref 0–0)
PLATELET # BLD AUTO: 172 X10*3/UL (ref 150–450)
POTASSIUM SERPL-SCNC: 4.3 MMOL/L (ref 3.5–5.3)
RBC # BLD AUTO: 4.48 X10*6/UL (ref 4.5–5.9)
SODIUM SERPL-SCNC: 134 MMOL/L (ref 136–145)
WBC # BLD AUTO: 18.8 X10*3/UL (ref 4.4–11.3)

## 2025-07-19 PROCEDURE — 2500000001 HC RX 250 WO HCPCS SELF ADMINISTERED DRUGS (ALT 637 FOR MEDICARE OP): Performed by: PHYSICIAN ASSISTANT

## 2025-07-19 PROCEDURE — 2500000004 HC RX 250 GENERAL PHARMACY W/ HCPCS (ALT 636 FOR OP/ED): Performed by: ORTHOPAEDIC SURGERY

## 2025-07-19 PROCEDURE — 36415 COLL VENOUS BLD VENIPUNCTURE: CPT | Performed by: ORTHOPAEDIC SURGERY

## 2025-07-19 PROCEDURE — 99024 POSTOP FOLLOW-UP VISIT: CPT | Performed by: NURSE PRACTITIONER

## 2025-07-19 PROCEDURE — 2500000001 HC RX 250 WO HCPCS SELF ADMINISTERED DRUGS (ALT 637 FOR MEDICARE OP): Performed by: ORTHOPAEDIC SURGERY

## 2025-07-19 PROCEDURE — 80048 BASIC METABOLIC PNL TOTAL CA: CPT | Performed by: ORTHOPAEDIC SURGERY

## 2025-07-19 PROCEDURE — 2500000002 HC RX 250 W HCPCS SELF ADMINISTERED DRUGS (ALT 637 FOR MEDICARE OP, ALT 636 FOR OP/ED): Performed by: ORTHOPAEDIC SURGERY

## 2025-07-19 PROCEDURE — 97165 OT EVAL LOW COMPLEX 30 MIN: CPT | Mod: GO | Performed by: OCCUPATIONAL THERAPIST

## 2025-07-19 PROCEDURE — 7100000011 HC EXTENDED STAY RECOVERY HOURLY - NURSING UNIT

## 2025-07-19 PROCEDURE — 97116 GAIT TRAINING THERAPY: CPT | Mod: GP,CQ

## 2025-07-19 PROCEDURE — 85027 COMPLETE CBC AUTOMATED: CPT | Performed by: ORTHOPAEDIC SURGERY

## 2025-07-19 RX ORDER — ONDANSETRON 4 MG/1
4 TABLET, ORALLY DISINTEGRATING ORAL EVERY 8 HOURS PRN
Qty: 20 TABLET | Refills: 0 | Status: SHIPPED | OUTPATIENT
Start: 2025-07-19 | End: 2025-07-26

## 2025-07-19 RX ORDER — ASPIRIN 81 MG/1
81 TABLET ORAL 2 TIMES DAILY
Qty: 60 TABLET | Refills: 0 | Status: SHIPPED | OUTPATIENT
Start: 2025-07-19 | End: 2025-08-18

## 2025-07-19 RX ORDER — DOCUSATE SODIUM 100 MG/1
100 CAPSULE, LIQUID FILLED ORAL 2 TIMES DAILY
Qty: 14 CAPSULE | Refills: 0 | Status: SHIPPED | OUTPATIENT
Start: 2025-07-19 | End: 2025-07-26

## 2025-07-19 RX ORDER — KETOROLAC TROMETHAMINE 10 MG/1
10 TABLET, FILM COATED ORAL EVERY 6 HOURS PRN
Qty: 20 TABLET | Refills: 0 | Status: SHIPPED | OUTPATIENT
Start: 2025-07-19

## 2025-07-19 RX ORDER — OXYCODONE HYDROCHLORIDE 5 MG/1
5 TABLET ORAL EVERY 6 HOURS PRN
Qty: 28 TABLET | Refills: 0 | Status: SHIPPED | OUTPATIENT
Start: 2025-07-19 | End: 2025-07-24 | Stop reason: SDUPTHER

## 2025-07-19 RX ADMIN — FLUTICASONE PROPIONATE 2 SPRAY: 50 SPRAY, METERED NASAL at 09:25

## 2025-07-19 RX ADMIN — CEFAZOLIN SODIUM 2 G: 2 INJECTION, SOLUTION INTRAVENOUS at 00:06

## 2025-07-19 RX ADMIN — OXYCODONE HYDROCHLORIDE 10 MG: 10 TABLET ORAL at 09:24

## 2025-07-19 RX ADMIN — OXYCODONE HYDROCHLORIDE 5 MG: 5 TABLET ORAL at 13:12

## 2025-07-19 RX ADMIN — HYDROMORPHONE HYDROCHLORIDE 0.5 MG: 1 INJECTION, SOLUTION INTRAMUSCULAR; INTRAVENOUS; SUBCUTANEOUS at 05:20

## 2025-07-19 RX ADMIN — DOCUSATE SODIUM 100 MG: 100 CAPSULE, LIQUID FILLED ORAL at 09:24

## 2025-07-19 RX ADMIN — OXYCODONE HYDROCHLORIDE 10 MG: 10 TABLET ORAL at 00:09

## 2025-07-19 RX ADMIN — ACETAMINOPHEN 650 MG: 325 TABLET ORAL at 00:06

## 2025-07-19 RX ADMIN — ACETAMINOPHEN 650 MG: 325 TABLET ORAL at 12:17

## 2025-07-19 RX ADMIN — ACETAMINOPHEN 650 MG: 325 TABLET ORAL at 05:20

## 2025-07-19 RX ADMIN — PANTOPRAZOLE SODIUM 40 MG: 40 TABLET, DELAYED RELEASE ORAL at 13:12

## 2025-07-19 RX ADMIN — TRANEXAMIC ACID 1950 MG: 650 TABLET ORAL at 05:20

## 2025-07-19 RX ADMIN — ASPIRIN 81 MG: 81 TABLET, COATED ORAL at 09:24

## 2025-07-19 ASSESSMENT — PAIN SCALES - GENERAL
PAINLEVEL_OUTOF10: 0 - NO PAIN
PAINLEVEL_OUTOF10: 8
PAINLEVEL_OUTOF10: 6
PAINLEVEL_OUTOF10: 6
PAINLEVEL_OUTOF10: 3
PAINLEVEL_OUTOF10: 8
PAINLEVEL_OUTOF10: 8
PAINLEVEL_OUTOF10: 6
PAINLEVEL_OUTOF10: 3

## 2025-07-19 ASSESSMENT — COGNITIVE AND FUNCTIONAL STATUS - GENERAL
HELP NEEDED FOR BATHING: A LITTLE
MOVING TO AND FROM BED TO CHAIR: A LITTLE
DAILY ACTIVITIY SCORE: 19
WALKING IN HOSPITAL ROOM: A LITTLE
STANDING UP FROM CHAIR USING ARMS: A LITTLE
MOBILITY SCORE: 20
DRESSING REGULAR UPPER BODY CLOTHING: A LITTLE
TOILETING: A LITTLE
DRESSING REGULAR LOWER BODY CLOTHING: A LITTLE
CLIMB 3 TO 5 STEPS WITH RAILING: A LITTLE
PERSONAL GROOMING: A LITTLE

## 2025-07-19 ASSESSMENT — PAIN - FUNCTIONAL ASSESSMENT
PAIN_FUNCTIONAL_ASSESSMENT: 0-10

## 2025-07-19 ASSESSMENT — PAIN DESCRIPTION - LOCATION
LOCATION: KNEE
LOCATION: KNEE

## 2025-07-19 ASSESSMENT — ACTIVITIES OF DAILY LIVING (ADL): ADL_ASSISTANCE: INDEPENDENT

## 2025-07-19 ASSESSMENT — PAIN DESCRIPTION - ORIENTATION
ORIENTATION: LEFT
ORIENTATION: LEFT

## 2025-07-19 NOTE — PROGRESS NOTES
"Robbie Tovar is a 70 y.o. male on day 0 of admission presenting with Unilateral primary osteoarthritis, left knee.    Subjective   The patient was in bed, awake, in NAD. C/o expected post-operative pain, which is well-controlled on pain medications. Denied fever, chills, SOB, n/v, chest pain. No issues with voiding.       Objective     Physical Exam  Constitutional:       Appearance: Normal appearance.   HENT:      Head: Normocephalic.      Nose: Nose normal.      Mouth/Throat:      Mouth: Mucous membranes are moist.     Eyes:      Pupils: Pupils are equal, round, and reactive to light.       Cardiovascular:      Rate and Rhythm: Normal rate and regular rhythm.      Pulses: Normal pulses.      Heart sounds: Normal heart sounds.   Pulmonary:      Effort: Pulmonary effort is normal.      Breath sounds: Normal breath sounds.   Abdominal:      Palpations: Abdomen is soft.     Musculoskeletal:         General: Normal range of motion.      Cervical back: Normal range of motion and neck supple.      Comments: Left knee dressing dry and intact. Light tough sensations intact throughout left lower extremity. Dorsal and plantar flexion intact. DP and TP pulses palpable and strong symmetrically.     Skin:     General: Skin is warm and dry.      Capillary Refill: Capillary refill takes less than 2 seconds.     Neurological:      General: No focal deficit present.      Mental Status: He is alert and oriented to person, place, and time.     Psychiatric:         Mood and Affect: Mood normal.         Last Recorded Vitals  Blood pressure 147/86, pulse 67, temperature 36.4 °C (97.5 °F), temperature source Temporal, resp. rate 18, height 1.854 m (6' 0.99\"), weight 107 kg (235 lb 14.3 oz), SpO2 96%.  Intake/Output last 3 Shifts:  I/O last 3 completed shifts:  In: 1812.5 (16.9 mL/kg) [I.V.:1612.5 (15.1 mL/kg); IV Piggyback:200]  Out: 2275 (21.3 mL/kg) [Urine:2275 (0.6 mL/kg/hr)]  Weight: 107 kg     Relevant Results      Scheduled " medications  Scheduled Medications[1]  Continuous medications  Continuous Medications[2]  PRN medications  PRN Medications[3]  Results for orders placed or performed during the hospital encounter of 07/18/25 (from the past 24 hours)   Basic metabolic panel   Result Value Ref Range    Glucose 122 (H) 74 - 99 mg/dL    Sodium 134 (L) 136 - 145 mmol/L    Potassium 4.3 3.5 - 5.3 mmol/L    Chloride 103 98 - 107 mmol/L    Bicarbonate 22 21 - 32 mmol/L    Anion Gap 13 10 - 20 mmol/L    Urea Nitrogen 19 6 - 23 mg/dL    Creatinine 0.75 0.50 - 1.30 mg/dL    eGFR >90 >60 mL/min/1.73m*2    Calcium 8.4 (L) 8.6 - 10.3 mg/dL   CBC   Result Value Ref Range    WBC 18.8 (H) 4.4 - 11.3 x10*3/uL    nRBC 0.0 0.0 - 0.0 /100 WBCs    RBC 4.48 (L) 4.50 - 5.90 x10*6/uL    Hemoglobin 13.0 (L) 13.5 - 17.5 g/dL    Hematocrit 39.0 (L) 41.0 - 52.0 %    MCV 87 80 - 100 fL    MCH 29.0 26.0 - 34.0 pg    MCHC 33.3 32.0 - 36.0 g/dL    RDW 12.5 11.5 - 14.5 %    Platelets 172 150 - 450 x10*3/uL          Assessment & Plan  Unilateral primary osteoarthritis, left knee    OA (osteoarthritis) of knee    POD #1 s/p  left TKA  Continue PT/OT, WBAT left  LE  Reviewed am labs  Multimodal pain regimen  knee dressing to be removed on post op day #7  VTE prophylaxis: ASA 81MG BID   Will discharge home today  Follow up with Dr. Whalen as directed     I spent 25 minutes in the professional and overall care of this patient.      Elvira Freitas, APRN-CNP           [1] acetaminophen, 650 mg, oral, q6h PAUL  aspirin, 81 mg, oral, BID  docusate sodium, 100 mg, oral, BID  fluticasone, 2 spray, Each Nostril, Daily  pantoprazole, 40 mg, oral, q24h    [2] lactated Ringer's, 50 mL/hr, Last Rate: 50 mL/hr (07/18/25 1345)  oxygen, 2 L/min, Last Rate: Stopped (07/18/25 1503)    [3] PRN medications: benzocaine-menthol, bisacodyl, cyclobenzaprine, HYDROmorphone, morphine, naloxone, ondansetron ODT **OR** ondansetron, oxyCODONE, oxyCODONE

## 2025-07-19 NOTE — PROGRESS NOTES
Occupational Therapy    Evaluation    Patient Name: Robbie Tovar  MRN: [id number]  Today's Date: 7/19/2025  Time Calculation  Start Time: 0809  Stop Time: 0829  Time Calculation (min): 20 min  4120/4120-A  Eval only     Assessment  IP OT Assessment  Prognosis: Good  Medical Staff Made Aware: Yes  End of Session Communication: Bedside nurse  End of Session Patient Position: Up in chair, Alarm on  Patient presents with decline in ADLs, functional transfers, functional mobility and would benefit from OT during acute stay to improve functional independence and safety. Recommend low intensity OT to maximize functional independence and safety.     Plan:  Treatment Interventions: ADL retraining, Functional transfer training, Patient/family training, Equipment evaluation/education, Compensatory technique education  OT Frequency: Daily  OT Discharge Recommendations: Low intensity level of continued care  Equipment Recommended upon Discharge: Wheeled walker (shower chair, reacher, LH shoe horn, LH sponge)  OT - OK to Discharge: Yes from acute care OT services to the next level of care when cleared by medical team    Subjective   Current Problem:  1. Unilateral primary osteoarthritis, left knee        2. Constipation due to opioid therapy  docusate sodium (Colace) 100 mg capsule      3. Deep vein thrombosis (DVT) prophylaxis prescribed at discharge  aspirin 81 mg EC tablet      4. Total knee replacement status, left  oxyCODONE (Roxicodone) 5 mg immediate release tablet    ketorolac (Toradol) 10 mg tablet      5. Nausea and vomiting in adult  ondansetron ODT (Zofran-ODT) 4 mg disintegrating tablet      6. Status post left knee replacement  Referral to Home Health          General:  General  Reason for Referral: left TKA  Referred By: Kris Whalen MD  Past Medical History Relevant to Rehab: Admitted 7/18/2025 after having left TKA completed by Dr Whalen.  PMH: low back pain, insomnia, OA  Prior to Session Communication: Bedside  nurse  Patient Position Received: Bed, 3 rail up, Alarm on  Preferred Learning Style: verbal  General Comment: Patient in long legged sitting in bed and agreeable to participate in OT evaluation.    Precautions:  LE Weight Bearing Status: Weight Bearing as Tolerated  Medical Precautions: Fall precautions  Post-Surgical Precautions: Left total knee precautions    Pain:  Pain Assessment  Pain Assessment: 0-10  0-10 (Numeric) Pain Score: 3 (reported pain increased with activity but did not rate)  Pain Type: Surgical pain  Pain Location: Knee  Pain Orientation: Left  Pain Interventions: Rest, Elevated    Objective   Cognition:  Overall Cognitive Status: Within Functional Limits  Orientation Level: Oriented X4    Home Living:  Home Living Comments: Pt lives alone in a ranch style house with 1 SILVANO to enter. Laundry in basement with 6 SILVANO down with HR. First floor set up with tub shower, no DME. Pt reports he is able to get a shower seat. Pt has a raised toilet seat with rails. Pt independent with mobility, ADLs and IADLs. Owns a RW and FWW.     Prior Function:  Level of [place]: Independent with ADLs and functional transfers, Independent with homemaking with ambulation  Receives Help From:  (significant other)  ADL Assistance: Independent  Homemaking Assistance: Independent  Ambulatory Assistance: Independent  Vocational: Full time employment  Prior Function Comments: (+) drives, (-) falls    ADL:  LE Dressing Assistance: Minimal (don underwear and shorts)  ADL Comments: Educated patient on safety and comp strategies for LB dressing. patient initially attemptimg to don underwear on non-surgical LE first.  Min verbal cues provided with good return demonstration of understanding.    Activity Tolerance:  Endurance: Endurance does not limit participation in activity    Bed Mobility/Transfers:   Bed Mobility  Bed Mobility:  (SBA supine to sit with head of bed elevated)  Transfers  Transfer:  (CGA sit <>stand with mod  verbal cues for safety and technique)  Educated patient on safety with car transfers and patient verbalized understanding.    Educated patient on safety and use of shower chair for safety and patient verbalized understanding. Educated patient on having [facility] assess shower safety prior to completing first shower and patient verbalized understanding. Patient reports plans of completing sponge bathing initially for the first few days.      Ambulation/Gait Training:  Functional Mobility  Functional Mobility Performed:  (CGA with WW functional mobility task in room)    Extremities: RUE   RUE : Within Functional Limits and LUE   LUE: Within Functional Limits    Outcome Measures: [facility] Daily Activity  Putting on and taking off regular lower body clothing: A little  Bathing (including washing, rinsing, drying): A little  Putting on and taking off regular upper body clothing: A little  Toileting, which includes using toilet, bedpan or urinal: A little  Taking care of personal grooming such as brushing teeth: A little  Eating Meals: None  Daily Activity - Total Score: 19    EDUCATION:  Education  Individual(s) Educated: Patient  Education Provided: Fall precautons (safety and comp strategies with LB dressing, safety with transfers and car transfers)  Patient Response to Education: Patient/Caregiver Verbalized Understanding of Information    Goals:   Encounter Problems       Encounter Problems (Active)       Dressings Lower Extremities       STG - Patient will complete lower body dressing independently with AE and comp strategies  (Progressing)       Start:  07/19/25    Expected End:  08/02/25               Functional Balance       STG-Patient will be independent with assistive device dynamic stand task >5 minutes for ADL completion   (Progressing)       Start:  07/19/25    Expected End:  08/02/25               Functional Mobility       STG-Patient will be independent with assistive device functional mobility tasks    (Progressing)       Start:  07/19/25    Expected End:  08/02/25               OT Transfers       STG - Patient will perform car transfers independently demonstrating good safety  (Progressing)       Start:  07/19/25    Expected End:  08/02/25            STG-Patient will be independent with functional transfers demonstrating good safety   (Progressing)       Start:  07/19/25    Expected End:  08/02/25

## 2025-07-19 NOTE — DISCHARGE SUMMARY
Discharge Diagnosis  Unilateral primary osteoarthritis, left knee          Test Results Pending At Discharge  Pending Labs       No current pending labs.            Hospital Course      Discharge summary        Hospital course    Robbie Tovar  underwent Procedure(s) (LRB):  LEFT KNEE MEDIAL UNICOMPARTMENTAL ARTHROPLASTY (Left) on 07/18/2025.  His postop course was uncomplicated.  Participated in physical and occupational therapy, tolerated a diet.  Was able to void well.  Worked with therapy.  Used incentive spirometry.  Was started on ASA 80 MG p.o. twice daily for VTE prophylaxis and instructed to continue for 30 days postoperatively.  Was given a prescription for ASA 81 MG BID x 30 days and instructed in its appropriate use.  Patient was discharged to home in satisfactory condition with home health care.    Patient tolerated surgical procedure well and there was no complications. Patient progressed adequately through their recovery during hospital stay including PT and rehabilitation.    Patient was then D/C on  to home  in stable condition.  Patient was instructed on the use of pain medications, the signs and symptoms of infection, and was given our number to call should they have any questions or concerns following discharge.    Based on my clinical judgment, the patient was provided with a 7-day prescription for opioid medication at 30 MED, indicated for treatment of acute pain in the setting of recent total knee replacement. OARRS report was run and has demonstrated an appropriate time course.  The patient has been provided with counseling pertaining to safe use of opioid medication.    Pertinent Physical Exam At Time of Discharge  Alert, oriented x 3, cooperative with examination.     Examination of the left  extremity reveals Mepilex  dressing is intact without drainage.  No ray-incisional ecchymosis.  EHL, plantarflexion, dorsiflexion are 4+/5.  Able to isometrically fire left quadriceps.  Sensory intact  light touch in the deep/superficial/common peroneal, saphenous, tibial, sural nerve distributions.  DP and popliteal pulses are 2+ with capillary refill less than 2 seconds.  Negative Homans' sign.      Home Medications     Medication List      START taking these medications     aspirin 81 mg EC tablet; Take 1 tablet (81 mg) by mouth 2 times a day.   docusate sodium 100 mg capsule; Commonly known as: Colace; Take 1   capsule (100 mg) by mouth 2 times a day for 7 days.   ketorolac 10 mg tablet; Commonly known as: Toradol; Take 1 tablet (10   mg) by mouth every 6 hours if needed for moderate pain (4 - 6).   ondansetron ODT 4 mg disintegrating tablet; Commonly known as:   Zofran-ODT; Dissolve 1 tablet (4 mg) in the mouth every 8 hours if needed   for nausea or vomiting for up to 7 days.   oxyCODONE 5 mg immediate release tablet; Commonly known as: Roxicodone;   Take 1 tablet (5 mg) by mouth every 6 hours if needed for moderate pain (4   - 6) for up to 7 days.     CHANGE how you take these medications     fluticasone 50 mcg/actuation nasal spray; Commonly known as: Flonase;   Administer 2 sprays into each nostril once daily; What changed: Another   medication with the same name was removed. Continue taking this   medication, and follow the directions you see here.     CONTINUE taking these medications     alpha lipoic acid 50 mg capsule   celecoxib 200 mg capsule; Commonly known as: CeleBREX; Take 1 capsule   (200 mg) by mouth 2 times a day as needed for mild pain (1 - 3)   pantoprazole 40 mg EC tablet; Commonly known as: ProtoNix; Take 1 tablet   (40 mg) by mouth once every 24 hours.   zolpidem 10 mg tablet; Commonly known as: Ambien; TAKE 1 TABLET (10 MG)   BY MOUTH AS NEEDED AT BEDTIME FOR SLEEP.     STOP taking these medications     chlorhexidine 0.12 % solution; Commonly known as: Peridex     ASK your doctor about these medications     olopatadine 0.2 % ophthalmic solution; Commonly known as: Pataday;   Administer  1 drop into both eyes once daily.           Patient may WBAT to operative extremity with use of walker for assistance with ambulation   Surgical dressing to be removed pod7 and incision left open to air  ASA 81MG BID for DVT prophylaxis started on 07/18/2025 and to be taken for 30 days  Follow up with surgeon in 2 weeks        Outpatient Follow-Up  Future Appointments   Date Time Provider Department Center   7/29/2025 10:15 AM Mona Kerr, PT [id number] [place]   8/1/2025 10:15 AM Rach G [place], PTA [id number] [place]   8/5/2025 10:30 AM Rach G Agustín, PTA [id number] [place]   8/8/2025 10:15 AM Kris Whalen MD [id number] [place]   8/12/2025 11:15 AM Rach G Agustín, PTA [id number] [place]   8/15/2025 10:15 AM Rach G Agustín, PTA [id number] [place]   8/18/2025  1:15 PM Ar Jean MD [id number] [place]   8/19/2025 10:30 AM Rach G Agustín, PTA [id number] [place]   8/22/2025 10:15 AM Mona Kerr, PT [id number] [place]   10/7/2025  1:45 PM Stewart Bernard MD PhD [id number] [place]   4/14/2026  3:00 PM Javi Calhoun, OD [id number] [place]          Visit Vitals  /86 (BP Location: Left arm, Patient Position: Lying)   Pulse 67   Temp 36.4 °C (97.5 °F) (Temporal)   Resp 18     Vitals:    07/18/25 0659   Weight: 107 kg (235 lb 14.3 oz)       Immunization History   Administered Date(s) Administered    Influenza, Unspecified 11/07/2017    Moderna SARS-CoV-2 Vaccination 12/28/2020, 01/27/2021, 11/13/2021    Pneumococcal polysaccharide vaccine, 23-valent, age 2 years and older (PNEUMOVAX 23) 08/22/2022       Results        Pertinent Physical Exam At Time of Discharge  Physical Exam    Home Medications     Medication List      START taking these medications     aspirin 81 mg EC tablet; Take 1 tablet (81 mg) by mouth 2 times a day.   docusate sodium 100 mg capsule; Commonly known as: Colace; Take 1   capsule (100 mg) by mouth 2 times a day for 7 days.   ketorolac 10 mg tablet; Commonly known as: Toradol; Take 1 tablet (10    mg) by mouth every 6 hours if needed for moderate pain (4 - 6).   ondansetron ODT 4 mg disintegrating tablet; Commonly known as:   Zofran-ODT; Dissolve 1 tablet (4 mg) in the mouth every 8 hours if needed   for nausea or vomiting for up to 7 days.   oxyCODONE 5 mg immediate release tablet; Commonly known as: Roxicodone;   Take 1 tablet (5 mg) by mouth every 6 hours if needed for moderate pain (4   - 6) for up to 7 days.     CHANGE how you take these medications     fluticasone 50 mcg/actuation nasal spray; Commonly known as: Flonase;   Administer 2 sprays into each nostril once daily; What changed: Another   medication with the same name was removed. Continue taking this   medication, and follow the directions you see here.     CONTINUE taking these medications     alpha lipoic acid 50 mg capsule   celecoxib 200 mg capsule; Commonly known as: CeleBREX; Take 1 capsule   (200 mg) by mouth 2 times a day as needed for mild pain (1 - 3)   pantoprazole 40 mg EC tablet; Commonly known as: ProtoNix; Take 1 tablet   (40 mg) by mouth once every 24 hours.   zolpidem 10 mg tablet; Commonly known as: Ambien; TAKE 1 TABLET (10 MG)   BY MOUTH AS NEEDED AT BEDTIME FOR SLEEP.     STOP taking these medications     chlorhexidine 0.12 % solution; Commonly known as: Peridex     ASK your doctor about these medications     olopatadine 0.2 % ophthalmic solution; Commonly known as: Pataday;   Administer 1 drop into both eyes once daily.       Outpatient Follow-Up  Future Appointments   Date Time Provider Department Center   7/29/2025 10:15 AM JESSIE [id number] [place]   8/1/2025 10:15 AM BRE [id number] [place]   8/5/2025 10:30 AM BRE [id number] [place]   8/8/2025 10:15 AM Kris Whalen MD [id number] [place]   8/12/2025 11:15 AM BRE [id number] [place]   8/15/2025 10:15 AM BRE [id number] [place]   8/18/2025  1:15 PM Ar Jean MD [facility]   8/19/2025 10:30 AM  Rach Randolph, PTA [id number] [place]   8/22/2025 10:15 AM Mona Kerr, PT [id number] [place]   10/7/2025  1:45 PM Stewart Bernard MD PhD [id number] [place]   4/14/2026  3:00 PM Javi Calhoun, OD [id number] [place]       Elvira Freitas, APRN-CNP

## 2025-07-19 NOTE — CARE PLAN
The patient's goals for the shift include      The clinical goals for the shift include manage pain    Over the shift, the patient did make progress toward the following goals.

## 2025-07-19 NOTE — PROGRESS NOTES
Physical Therapy    Physical Therapy Treatment    Patient Name: Robbie Tovar  MRN: [id number]  Department: [facility]  [facility]  Today's Date: 7/19/2025            Assessment/Plan   PT Assessment  PT Assessment Results: Decreased strength, Decreased range of motion, Decreased endurance, Impaired balance, Decreased mobility, Decreased safety awareness, Pain  Rehab Prognosis: Good  Medical Staff Made Aware: Yes  End of Session Communication: Bedside nurse  Assessment Comment: Pt tolerated session with no issues  End of Session Patient Position: Up in chair, Alarm on     PT Plan  Treatment/Interventions: Bed mobility, Transfer training, Gait training, Stair training, Balance training, Neuromuscular re-education, Endurance training, Strengthening, Range of motion, Therapeutic exercise, Therapeutic activity, Home exercise program, Positioning, Postural re-education  PT Plan: Ongoing PT  PT Frequency: BID  PT Discharge Recommendations: Low intensity level of continued care  Equipment Recommended upon Discharge: Wheeled walker  PT Recommended Transfer Status: Assist x1, Assistive device, Contact guard  PT - OK to Discharge: Yes (Once medically cleared)    PT Visit Info:  PT Received On: 07/19/25  Response to Previous Treatment: Patient with no complaints from previous session.     General Visit Information:   General  Reason for Referral: P/w L knee OA. Pt now s/p L TKA with Dr. Whalen 7/18.  Referred By: Dr. Whalen  Past Medical History Relevant to Rehab: L knee OA, GERD, cervical radiculopathy  Prior to Session Communication: Bedside nurse  Patient Position Received: Up in chair, Alarm on  Preferred Learning Style: verbal  General Comment: Pt pleasant and agreeable to work with therapy.    Subjective   Precautions:  Precautions  LE Weight Bearing Status: Weight Bearing as Tolerated  Medical Precautions: Fall precautions  Post-Surgical Precautions: Left total knee precautions            Objective   Pain:  Pain  Assessment  Pain Assessment: 0-10  0-10 (Numeric) Pain Score: 6  Pain Type: Surgical pain  Pain Location: Knee  Pain Orientation: Left    Activity Tolerance:  Activity Tolerance  Endurance: Endurance does not limit participation in activity  Treatments:  Ambulation/Gait Training  Ambulation/Gait Training Performed: Yes  Ambulation/Gait Training 1  Surface 1: Level tile  Device 1: Rolling walker  Gait Support Devices: Gait belt  Assistance 1: Close supervision  Quality of Gait 1: Decreased step length, Forward flexed posture  Comments/Distance (ft) 1: 100'  Transfers  Transfer: Yes  Transfer 1  Transfer From 1: Chair with arms to  Transfer to 1: Stand  Technique 1: Sit to stand, Stand to sit  Transfer Device 1: Walker, Gait belt  Transfer Level of Assistance 1: Close supervision    Stairs  Stairs: Yes  Stairs  Rails 1: Bilateral  Device 1: Railing  Support Devices 1: Gait belt  Assistance 1: Close supervision  Comment/Number of Steps 1: 3 BHR    Outcome Measures:  [facility] Basic Mobility  Turning from your back to your side while in a flat bed without using bedrails: None  Moving from lying on your back to sitting on the side of a flat bed without using bedrails: None  Moving to and from bed to chair (including a wheelchair): A little  Standing up from a chair using your arms (e.g. wheelchair or bedside chair): A little  To walk in hospital room: A little  Climbing 3-5 steps with railing: A little  Basic Mobility - Total Score: 20    Education Documentation  No documentation found.  Education Comments  No comments found.        OP EDUCATION:       Encounter Problems       Encounter Problems (Active)       PT Problem       Pt will be able to ambulate >/= 300 ft with FWW SBA with good safety awareness and stability.        Start:  07/18/25    Expected End:  08/01/25            Pt will be able to complete all transfers with FWW mod I demonstrating good safety awareness and proper body mechanics.        Start:  07/18/25     Expected End:  08/01/25            Pt will complete supine, seated and standing exercises to maintain/improve overall strength with minimal verbal cues.         Start:  07/18/25    Expected End:  08/01/25            Pt will be able to negotiate ascending/descending >/= 4 SILVANO with use of unilateral HR SBA with sufficient foot clearance and good safety awareness for safe home going.         Start:  07/18/25    Expected End:  08/01/25

## 2025-07-20 ENCOUNTER — HOME CARE VISIT (OUTPATIENT)
Dept: HOME HEALTH SERVICES | Facility: HOME HEALTH | Age: 70
End: 2025-07-20
Payer: MEDICARE

## 2025-07-20 PROCEDURE — 169592 NO-PAY CLAIM PROCEDURE

## 2025-07-20 PROCEDURE — G0151 HHCP-SERV OF PT,EA 15 MIN: HCPCS | Mod: HHH

## 2025-07-20 SDOH — HEALTH STABILITY: PHYSICAL HEALTH: EXERCISE COMMENTS: IS PLACED ON OTTOMAN X 3 MIN ENCOURAGING L KNEE EXT.

## 2025-07-20 SDOH — HEALTH STABILITY: PHYSICAL HEALTH
EXERCISE COMMENTS: SUPINE EXC COMPLETED LLE X 10 REPS WITH INTERMITTENT MIN ASSIST OF P.T. TO FACILITATE PROPER TECHNIQUE INCLUDING AP, QUAD SET, GLUT SET, HEEL SLIDES, HIP ABD/ADD, SAQ, SLR. HANDOUT PROVIDED.    WALL PULLS X 5 REPS SEATED ON ROLLATOR.  SEATED, L HEEL

## 2025-07-20 ASSESSMENT — ENCOUNTER SYMPTOMS
PAIN LOCATION: LEFT KNEE
AGGRESSION WITHIN DEFINED LIMITS: 1
SLEEP QUALITY: ADEQUATE
PERSON REPORTING PAIN: PATIENT
PAIN SEVERITY GOAL: 1/10
SUBJECTIVE PAIN PROGRESSION: GRADUALLY IMPROVING
HIGHEST PAIN SEVERITY IN PAST 24 HOURS: 8/10
ANGER WITHIN DEFINED LIMITS: 1
LOWEST PAIN SEVERITY IN PAST 24 HOURS: 4/10
PAIN: 1

## 2025-07-20 ASSESSMENT — ACTIVITIES OF DAILY LIVING (ADL)
OASIS_M1830: 05
ENTERING_EXITING_HOME: MINIMUM ASSIST
AMBULATION_DISTANCE/DURATION_TOLERATED: MULTIPLE HOUSEHOLD DISTANCES LESS THAN 100 FT

## 2025-07-21 ENCOUNTER — TELEPHONE (OUTPATIENT)
Dept: ORTHOPEDIC SURGERY | Facility: CLINIC | Age: 70
End: 2025-07-21
Payer: COMMERCIAL

## 2025-07-21 ENCOUNTER — HOME CARE VISIT (OUTPATIENT)
Dept: HOME HEALTH SERVICES | Facility: HOME HEALTH | Age: 70
End: 2025-07-21
Payer: MEDICARE

## 2025-07-21 VITALS — SYSTOLIC BLOOD PRESSURE: 133 MMHG | HEART RATE: 80 BPM | DIASTOLIC BLOOD PRESSURE: 70 MMHG | OXYGEN SATURATION: 98 %

## 2025-07-21 DIAGNOSIS — R06.6 INTRACTABLE HICCUPS: ICD-10-CM

## 2025-07-21 PROCEDURE — G0152 HHCP-SERV OF OT,EA 15 MIN: HCPCS | Mod: HHH

## 2025-07-21 RX ORDER — CHLORPROMAZINE HYDROCHLORIDE 25 MG/1
25 TABLET, FILM COATED ORAL 4 TIMES DAILY PRN
Qty: 12 TABLET | Refills: 0 | Status: SHIPPED | OUTPATIENT
Start: 2025-07-21 | End: 2025-07-24

## 2025-07-21 ASSESSMENT — ENCOUNTER SYMPTOMS
PAIN LOCATION - PAIN SEVERITY: 5/10
PERSON REPORTING PAIN: PATIENT
SUBJECTIVE PAIN PROGRESSION: GRADUALLY IMPROVING
LOWEST PAIN SEVERITY IN PAST 24 HOURS: 5/10
PAIN LOCATION: LEFT LEG
PAIN LOCATION - RELIEVING FACTORS: REST/MEDS/ICE
PAIN: 1
PAIN LOCATION - EXACERBATING FACTORS: ACTIVITIY
HIGHEST PAIN SEVERITY IN PAST 24 HOURS: 8/10
PAIN LOCATION - PAIN FREQUENCY: FREQUENT

## 2025-07-21 ASSESSMENT — ACTIVITIES OF DAILY LIVING (ADL)
TOILETING: 1
BATHING ASSESSED: 1
DRESSING_LB_CURRENT_FUNCTION: SUPERVISION
DRESSING_UB_CURRENT_FUNCTION: INDEPENDENT
TOILETING: SUPERVISION
BATHING_CURRENT_FUNCTION: STAND BY ASSIST
PREPARING MEALS: NEEDS ASSISTANCE

## 2025-07-21 NOTE — TELEPHONE ENCOUNTER
Left a VM for the patient that we will send in Thorazine for his excessive hiccups, but if he doesn't want to take it, he doesn't have to.

## 2025-07-22 ENCOUNTER — HOME CARE VISIT (OUTPATIENT)
Dept: HOME HEALTH SERVICES | Facility: HOME HEALTH | Age: 70
End: 2025-07-22
Payer: MEDICARE

## 2025-07-22 PROCEDURE — G0151 HHCP-SERV OF PT,EA 15 MIN: HCPCS | Mod: HHH

## 2025-07-22 ASSESSMENT — ENCOUNTER SYMPTOMS
PAIN: 1
HIGHEST PAIN SEVERITY IN PAST 24 HOURS: 6/10
PAIN LOCATION: LEFT KNEE
PERSON REPORTING PAIN: PATIENT
LOWEST PAIN SEVERITY IN PAST 24 HOURS: 3/10
PAIN SEVERITY GOAL: 1/10

## 2025-07-24 ENCOUNTER — HOME CARE VISIT (OUTPATIENT)
Dept: HOME HEALTH SERVICES | Facility: HOME HEALTH | Age: 70
End: 2025-07-24
Payer: MEDICARE

## 2025-07-24 ENCOUNTER — TELEPHONE (OUTPATIENT)
Dept: ORTHOPEDIC SURGERY | Facility: CLINIC | Age: 70
End: 2025-07-24
Payer: COMMERCIAL

## 2025-07-24 DIAGNOSIS — Z96.652 TOTAL KNEE REPLACEMENT STATUS, LEFT: ICD-10-CM

## 2025-07-24 PROCEDURE — G0151 HHCP-SERV OF PT,EA 15 MIN: HCPCS | Mod: HHH

## 2025-07-24 RX ORDER — OXYCODONE HYDROCHLORIDE 5 MG/1
5 TABLET ORAL EVERY 6 HOURS PRN
Qty: 28 TABLET | Refills: 0 | Status: SHIPPED | OUTPATIENT
Start: 2025-07-24 | End: 2025-08-01

## 2025-07-24 SDOH — HEALTH STABILITY: PHYSICAL HEALTH: EXERCISE TYPE: SUPINE EXS X 15 REPS , STANDING X10 REPS

## 2025-07-24 ASSESSMENT — ACTIVITIES OF DAILY LIVING (ADL)
AMBULATION ASSISTANCE ON FLAT SURFACES: 1
OASIS_M1830: 01
HOME_HEALTH_OASIS: 00

## 2025-07-24 ASSESSMENT — ENCOUNTER SYMPTOMS
HIGHEST PAIN SEVERITY IN PAST 24 HOURS: 7/10
PAIN: 1
PAIN LOCATION: RIGHT KNEE
LOWEST PAIN SEVERITY IN PAST 24 HOURS: 4/10
PERSON REPORTING PAIN: PATIENT
OCCASIONAL FEELINGS OF UNSTEADINESS: 0

## 2025-07-24 NOTE — TELEPHONE ENCOUNTER
Patient requested refill on Oxycodone, stated that he has been taking every 4-5 hours and it seems to help.

## 2025-07-24 NOTE — TELEPHONE ENCOUNTER
Patient calling to address concern with pain level being stead at 5/7, unable to lay down. Currently taking oxy every 6hr. Patient was advised to try taking oxy when necessary at the 4hr gracie and to work in another medication to allow more pain relief. Patient was advised clinical team will be in contact to discuss.

## 2025-07-24 NOTE — HOME HEALTH
patient seen for agency dc today .  he states he is happy with his [facility] services and the care that has been provided.  he denies any questions re his hep or activity and agrees with dc today

## 2025-07-25 ENCOUNTER — PHARMACY VISIT (OUTPATIENT)
Dept: PHARMACY | Facility: CLINIC | Age: 70
End: 2025-07-25
Payer: COMMERCIAL

## 2025-07-25 PROCEDURE — RXMED WILLOW AMBULATORY MEDICATION CHARGE

## 2025-07-29 ENCOUNTER — EVALUATION (OUTPATIENT)
Dept: PHYSICAL THERAPY | Facility: CLINIC | Age: 70
End: 2025-07-29
Payer: COMMERCIAL

## 2025-07-29 DIAGNOSIS — R29.898 DECREASED STRENGTH OF LOWER EXTREMITY: ICD-10-CM

## 2025-07-29 DIAGNOSIS — M25.662 DECREASED RANGE OF MOTION OF LEFT KNEE: Primary | ICD-10-CM

## 2025-07-29 LAB
ATRIAL RATE: 76 BPM
P AXIS: 47 DEGREES
P OFFSET: 195 MS
P ONSET: 136 MS
PR INTERVAL: 150 MS
Q ONSET: 211 MS
QRS COUNT: 13 BEATS
QRS DURATION: 106 MS
QT INTERVAL: 394 MS
QTC CALCULATION(BAZETT): 443 MS
QTC FREDERICIA: 426 MS
R AXIS: 13 DEGREES
T AXIS: 53 DEGREES
T OFFSET: 408 MS
VENTRICULAR RATE: 76 BPM

## 2025-07-29 PROCEDURE — 97110 THERAPEUTIC EXERCISES: CPT | Mod: GP

## 2025-07-29 PROCEDURE — 97161 PT EVAL LOW COMPLEX 20 MIN: CPT | Mod: GP

## 2025-07-29 NOTE — PROGRESS NOTES
Physical Therapy    Physical Therapy Evaluation and Treatment      Patient Name: Robbie Tovar  MRN: [id number]  Today's Date: 7/29/2025  Time Calculation  Stop Time: 1115  Time Calculation (min): 55 min    Insurance - reviewed   Visit number: 1 (updated 07/29/25)   Payor:  EMPLOYEE MEDICAL PLAN / Plan:  EMPLOYEE MEDICAL PLAN TRADITIONAL  / Product Type: *No Product type* /    $25 COPAY 30 PT/OT V PCY 0 USED NEEDS AUTH CONTIGO PAYS % OOP 1600.00 893.02 APPLIED   Surgeon: Kris Whalen  Surgery: Left Knee Medial Unicompartmental Arthroplasty - Left, 7/18/2025.  Days since surgery: 11       Assessment:  PT Assessment  PT Assessment Results: Decreased strength, Decreased range of motion, Impaired balance, Pain  Rehab Prognosis: Good   Robbie Tovar  is a 70 y.o. old patient who participated in a physical therapy evaluation today s/p L Knee TKA on 7/18/25.  Robbie's impairments include: balance deficits, gait deficits, pain, decreased strength, and decreased range of motion.   Due to these impairments, he has the following functional limitations and participation restrictions:  increased fall risk, difficulty with ambulation, difficulty with stair negotiation, difficulty performing transfers, difficulty performing household activities, difficulty performing recreational activities, difficulty with completing/performing some ADLs/IADLs, and increased time to complete ADLs/IADLs. Robbie's clinical presentation is Stable and/or uncomplicated characteristics with the level of complexity being low. Robbie's potential for rehab is good.  Following objective portion of today's session, introduced various therex to patient to address LE weakness as well as ROM deficits. Attempted to perform gait with a cane today, however, buckling of L LE occurred shortly after starting to walk, so advised patient to continue using walker both at home and in the community until quad strength improves. Focus of therex today was to begin  working on LE strength (most notably quad) in addition to targeting knee extension ROM. Patient able to complete all therex today correctly given moderate verbal and visual cues. Skilled physical therapy services are appropriate and beneficial in order to achieve measurable and meaningful change in the objective tests and measures. Utilization of skilled physical therapy services will aid in advancing his functional status and attaining his therapy-related goals. Robbie verbalized understanding and is in agreement with all goals and plan of care.  Robbie left session with all questions answered and no increase in symptoms.      Plan:  OP PT Plan  Treatment/Interventions: Cryotherapy, Dry needling, Education/ Instruction, Electrical stimulation, Gait training, Manual therapy, Neuromuscular re-education, Therapeutic activities, Therapeutic exercises, Taping techniques  PT Plan: Skilled PT  PT Frequency: 2 times per week  Duration: 12 weeks  Onset Date: 07/29/25  Rehab Potential: Good  Plan of Care Agreement: Patient  Continue with progressing LE strengthening and ROM therex (knee extension is chief deficit on eval) as tolerated. Consider using NMES at next session due to significant quad weakness/quad lag apparent at eval.  Teresa Mclaughlin, SPT, participated during session. Mona Kerr, PT, DPT, NCS was present and directly supervised during PT evaluation. I, Mona Kerr, agree with assessment and plan.       Current Problem:   Problem List Items Addressed This Visit           ICD-10-CM    Decreased range of motion of left knee - Primary M25.662    Relevant Orders    Follow Up In Physical Therapy    Decreased strength of lower extremity R29.898    Relevant Orders    Follow Up In Physical Therapy         Subjective    General:     Patient's friend, Kamlesh, present at today's appointment.    Robbie Tovar  is a 70 y.o. male  presenting to the clinic s/p L TKA on 7/18/25. He reports that since his surgery he has had more  pain than he expected. He is still taking oxycodone for the pain at this time. Pain is currently waking him up a couple of times throughout the night. With home health he had started doing a lot of walking with the walker, ROM exercises, mini squats at sink. He is still using the walker for walking but occasionally will walk without it. He has been doing ok with climbing one step to get into and out of the house.    Reports symptoms are better with icing, walking around, movement in general, medication    Reports symptoms are worse with accidentally going up the stairs with wrong leg, initially getting up    Robbie Tovar  denies:  numbness, tingling, bowel changes, bladder changes, unexpected weight loss within the past 4 weeks, and unexpected weight gain within the past 4 weeks    Medical History Form: Reviewed (scanned into chart)    Living Environment: single level house one step up, has 8 steps into the basement where laundry is located, Friend moved in following surgery to help around the home    Occupation: Employed full-time manager, currently working from home      Prior Level of Function: Independent    Exercise: regularly as directed was going to the gym frequently prior to his surgery    Functional Limitations: walking without walker, stairclimbing, independence with ADLs    Pt goals for therapy:  get back to the gym, get back to life in general, less pain, more independence     Precautions:  Fall Risk: Moderate   Denies: Cancer, Pacemaker, Diabetes, Hypertension, latex allergy, epilepsy/seizures, other known cardiac problems, other known neurologic problem, other known pulmonary problems, and unexpected weight gain or loss  Past Surgical history: R knee replacement 5 years ago and subsequent EDMAR R knee    Pain:  5-6/10 after taking meds  pain location: L knee         Objective     Posture: Rounded shoulders in sitting  Transfers: Independent without use of walker, tends to use momentum to help with STS  transfer due to decreased strength and ROM of bilateral knees  Bed Mobility: independent  Gait: Able to perform swing through gait pattern with equal WTB bilaterally with walker; trialed walking with cane this date and L knee buckled after 2 steps due to quad weakness so discontinued  Stairs: not tested this date; patient reports able to go up one step at home to get into house with walker    Myotomes/Strength: MMT in sitting unless otherwise documented  Hip Flex (L2) R/L: 4-/4-  Hip Add R/L: 4/4  Hip Abd R/L: 4+/4+  Knee Ext (L3) R/L: at least 3/5 based upon functional mobility, + quad lag on the L with SLR  Knee Flex R/L: at least 3/5 based upon functional mobility  Ankle DF (L4) R/L:at least 3/5 based upon functional mobility    Range of Motion: measured in supine using goniometer in degrees (flexion AAROM using strap)  Knee Flex R/L: 79/105  Knee Ext R/L: 7/12    Incision: incision clean, dry and intact; moderate erythema surrounding incision, however, no apparent signs of infection. Reviewed signs of infection.  Edema: moderate edema in bilateral legs L>R. On L, non-pitting and pedal pulse intact.  Issued tubigrip size E    Flexibility:  Hamstrings: not formally measured this date, however, during hamstring stretch to improve L knee extension, significant limitation noted consistent with knee ext measurement    Outcome Measures:  Other Measures  Lower Extremity Funtional Score (LEFS): 36     Treatments:    Therapeutic Exercise:  15 minutes    Trialed walking with cane, significant L knee instability noted based upon buckling shortly after beginning to walk.  NuStep - 5 min, Seat 13, Level 5 - to promote movement throughout bilateral LE to decrease swelling, to improve ROM of bilateral knees  Reviewed HH HEP during   Heel slides, quad set, ankle pumps, standing marches, hip abd/ext  Seated hamstring stretch - 3x15-30s  Seated calf stretch - 3x15-30s - not performed today but verbally reviewed for HEP  LAQs -  2x10  Seated knee extension prop- performed with ice pack donned today - 4 minutes    Access Code: [email or web address]  URL: [email or web address]  Date: 07/29/2025  Prepared by:     Exercises  - Seated Hamstring Stretch  - 2 x daily - 7 x weekly - 10 reps - 10 sec hold  - Long Sitting Calf Stretch with Strap  - 2 x daily - 7 x weekly - 10 reps - 10 sec hold  - Seated Long Arc Quad  - 1 x daily - 7 x weekly - 1 sets - 10 reps  - Heel Prop  - 2-5 x daily - 7 x weekly - 1-5 minute hold    Modalities:       Cold Pack 15 minutes - unbilled  Seated, initially with knee in extension on stool but changed to sitting with knee bent following increase in knee discomfort (discomfort went away once bent again)      EDUCATION:  Outpatient Education  Individual(s) Educated: Patient  Education Provided: Anatomy, Fall Risk, Home Exercise Program  Risk and Benefits Discussed with Patient/Caregiver/Other: yes  Patient/Caregiver Demonstrated Understanding: yes  Plan of Care Discussed and Agreed Upon: yes  Patient Response to Education: Patient/Caregiver Verbalized Understanding of Information  -Educated Robbie  on the role of PT and PT POC  -Educated Robbie regarding benefit and purpose of skilled PT services along with results of examination findings and how this correlates to their chief complaint.   -Educated on what NMES is, how this may be used in the future to help with quad strengthening  -Discussed that he can return to the gym to use the NuStep, but would not recommend using other machines, such as the elliptical, at this time.  -Answered oRbbie's questions in full.  -Educated Robbie on relevant anatomy and the rationale for exercises    Goals:    STG's (within 2 weeks)    Robbie Tovar will demonstrate independence,  excellent understanding of and report compliance with at least 3 exercises in his HEP to facilitate the learning process to maximize PT benefits and to facilitate independent rehab program upon  discharge.    LTG's (by discharge)    Robbie Tovar will improve L knee flexion ROM to >/= 110 degrees so that he is able to squat down to grab an object off of the floor with ease.    Robbie Tovar will demonstrate global L LE strength of at least 4+/5 bilaterally so that he is able to properly control his knee during gait to prevent buckling and improve ability to navigate stairs to resume going to the basement for laundry.    Robbie Tovar will be able to perform a SLR with no quad lag to demonstrate improvement in L quad strength and dynamic control.    Robbie Tovar will be able to walk independently with no device and no evident gait deviations on the L LE (limitation is R LE).    Robbie Tovar will be able to climb 8 stairs independently with no device and the use of 1 railing so that he can go into his basement safely at home.    Robbie Tovar will demonstrate independence,  excellent understanding of and report compliance with HEP to facilitate the learning process to maximize PT benefits and to facilitate independent rehab program upon discharge.    Time Calculation  Start Time: 1020  Stop Time: 1115  Time Calculation (min): 55 min  PT Evaluation Time Entry  PT Evaluation (Low) Time Entry: 25  PT Therapeutic Procedures Time Entry  Therapeutic Exercise Time Entry: 15              Non-Billable Time  Non-billable time: 15  Non-billable time reason: coldpack end of session

## 2025-07-31 ENCOUNTER — TELEPHONE (OUTPATIENT)
Dept: ORTHOPEDIC SURGERY | Facility: CLINIC | Age: 70
End: 2025-07-31
Payer: COMMERCIAL

## 2025-07-31 ENCOUNTER — PATIENT MESSAGE (OUTPATIENT)
Dept: ORTHOPEDIC SURGERY | Facility: CLINIC | Age: 70
End: 2025-07-31
Payer: COMMERCIAL

## 2025-07-31 DIAGNOSIS — Z96.652 TOTAL KNEE REPLACEMENT STATUS, LEFT: ICD-10-CM

## 2025-07-31 DIAGNOSIS — Z96.652 S/P LEFT UNICOMPARTMENTAL KNEE REPLACEMENT: ICD-10-CM

## 2025-07-31 PROCEDURE — RXMED WILLOW AMBULATORY MEDICATION CHARGE

## 2025-07-31 RX ORDER — OXYCODONE HYDROCHLORIDE 5 MG/1
5 TABLET ORAL EVERY 6 HOURS PRN
Qty: 28 TABLET | Refills: 0 | Status: SHIPPED | OUTPATIENT
Start: 2025-07-31 | End: 2025-08-08

## 2025-07-31 NOTE — PROGRESS NOTES
Physical Therapy    Physical Therapy Treatment    Patient Name: Robbie Tovar  MRN: [id number]  Today's Date: 8/1/2025  Time Calculation  Start Time: 1008  Stop Time: 1110  Time Calculation (min): 62 min  Insurance - reviewed   Visit number: 2 (updated 08/01/25)   STILL AWAITING AUTH on 8/1  Payor:  EMPLOYEE MEDICAL PLAN / Plan:  EMPLOYEE MEDICAL PLAN TRADITIONAL  / Product Type: *No Product type* /    $25 COPAY 30 PT/OT V PCY 0 USED NEEDS AUTH CONTIGO PAYS % OOP 1600.00 893.02 APPLIED   Surgeon: Kris Whalen  Surgery: Left Knee Medial Unicompartmental Arthroplasty - Left, 7/18/2025.  Days since surgery: 11       Current Problem  Problem List Items Addressed This Visit           ICD-10-CM    Decreased range of motion of left knee - Primary M25.662    Decreased strength of lower extremity R29.898         Precautions   Fall Risk: Moderate   Denies: Cancer, Pacemaker, Diabetes, Hypertension, latex allergy, epilepsy/seizures, other known cardiac problems, other known neurologic problem, other known pulmonary problems, and unexpected weight gain or loss  Past Surgical history: R knee replacement 5 years ago and subsequent EDMAR R knee  L TKA 7/18/25    General  Subjective      Robbie Tovar denies any falls or complications since last session. Robbie Tovar reports less feeling of L LE buckling.  Pt inquiring re: transition to cane and when he can perform basement steps w/ U HR to do laundry.  Pt accompanied by partner Kamlesh Tovar reports good compliance with HEP.    Pain:  3-4/10; premedicated w/ oxycodone  Pain location: L Knee       Objective   8/1:   degrees : progress    Treatments:  Abbreviation Key  NC = not completed this visit   NV = next visit  // = parallel    Assigned HEP- [employer] [email or web address]     Therapeutic Exercise:  35 minutes    NuStep - 5 min, Seat 13>>>12 , Level 5 - to promote movement throughout bilateral LE to decrease swelling, to improve ROM of bilateral knees  Trialed walking with U  "HR + SPC then SPC only  inside // bars: no L quad buckling, pt can trial in home but continue to use WW in community till quad strength improves  6\" step up/down inside // bars w/ U HR then PT stairs using step to pattern w/ U HR: safe, pt can resume when feels ready  Seated hamstring stretch - 3x15-30s  SLR 5x, mild quad lag, very labored  LAQs - 2x10  Seated knee extension prop-reviewed, pt performed partially w/ small towel under knee during NMES    Neuromuscular Re-education:   minutes       Manual Therapy:  15 minutes   Seated STM/retro grade massage L distal quad < med/jt lines L Knee  KT tape web for L knee jt effusion    Gait Training:   minutes     Modalities  Electrical Stimulation (unattended)  Long sit w/ towel roll under knee:  NMES (Russian) stim, L VMO 10\"on/10\"off w/ quad setting, 8 min plus set up (intensity 16: good active quad set)    Outpatient Education: Reviewed home exercise program. Provided manual cues for correct performance of exercises. Provided verbal feedback to improve exercise technique. Rationale for all tx interventions   The rationale for kinesiotape application was discussed with patient.  Pt was advised that the tape will generally stay on for 2-3 days.  If skin irritation, such as redness or itching were to occur, Pt was advised to remove the tape and gently wash the adhesive off.  Pt agreed to kinesiotaping.   Robbie Tovar verbalizes understanding of all education provided: Yes    Assessment:  Robbie Tovar completed treatment today which focused upon there ex, manual therapy, pt education in order to address objective deficits s/p L TKA 7/18/25. .   Robbie's  response to tx was: Improved quad activation, improved ease standing from plinth post tx.  Improved joint mobility.  No change in pain.. Robbie's Progress towards goals: Improved gait quality. Improved ability to complete household activities. Reduced intensity of pain.. Robbie Tovar continues to have decreased strength, " decreased ROM, and pain limiting participation in household chores, recreational activities, occupational tasks, and exercise. Further skilled therapy services are warranted to address the remaining impairments in order to progress towards functional goals. Robbie left session with all questions answered and no increase in symptoms.      Plan:  Progress as able, focus on improving knee extension > flexion and quad strength  Pt eager to return to gym when able:  leg press nv??      Time Calculation  Start Time: 1008  Stop Time: 1110  Time Calculation (min): 62 min     PT Therapeutic Procedures Time Entry  Therapeutic Exercise Time Entry: 35  Manual Therapy Time Entry: 15  PT Modalities Time Entry  E-Stim (Unattended) Time Entry: 12

## 2025-08-01 ENCOUNTER — PHARMACY VISIT (OUTPATIENT)
Dept: PHARMACY | Facility: CLINIC | Age: 70
End: 2025-08-01
Payer: COMMERCIAL

## 2025-08-01 ENCOUNTER — TREATMENT (OUTPATIENT)
Dept: PHYSICAL THERAPY | Facility: CLINIC | Age: 70
End: 2025-08-01
Payer: COMMERCIAL

## 2025-08-01 DIAGNOSIS — R29.898 DECREASED STRENGTH OF LOWER EXTREMITY: ICD-10-CM

## 2025-08-01 DIAGNOSIS — M25.662 DECREASED RANGE OF MOTION OF LEFT KNEE: Primary | ICD-10-CM

## 2025-08-01 PROCEDURE — 97110 THERAPEUTIC EXERCISES: CPT | Mod: GP,CQ

## 2025-08-01 PROCEDURE — 97014 ELECTRIC STIMULATION THERAPY: CPT | Mod: GP,CQ

## 2025-08-01 PROCEDURE — 97140 MANUAL THERAPY 1/> REGIONS: CPT | Mod: GP,CQ

## 2025-08-04 NOTE — PROGRESS NOTES
Physical Therapy    Physical Therapy Treatment    Patient Name: Robbie Tovar  MRN: [id number]  Today's Date: 8/5/2025  Time Calculation  Start Time: 1026  Stop Time: 1115  Time Calculation (min): 49 min  Insurance - reviewed   Visit number: 3 (updated 08/05/25)   STILL AWAITING AUTH on 8/1  Payor:  EMPLOYEE MEDICAL PLAN / Plan:  EMPLOYEE MEDICAL PLAN TRADITIONAL  / Product Type: *No Product type* /    $25 COPAY 30 PT/OT V PCY 0 USED NEEDS AUTH CONTIGO PAYS % OOP 1600.00 893.02 APPLIED   Surgeon: Kris Whalen  Surgery: Left Knee Medial Unicompartmental Arthroplasty - Left, 7/18/2025.  Days since surgery: 18       Current Problem  Problem List Items Addressed This Visit           ICD-10-CM    Decreased range of motion of left knee - Primary M25.662    Decreased strength of lower extremity R29.898         Precautions   Fall Risk: Moderate   Denies: Cancer, Pacemaker, Diabetes, Hypertension, latex allergy, epilepsy/seizures, other known cardiac problems, other known neurologic problem, other known pulmonary problems, and unexpected weight gain or loss  Past Surgical history: R knee replacement 5 years ago and subsequent EDMAR R knee  L TKA 7/18/25    General  Subjective      Robbie Tovar denies any falls or complications since last session. Robbie Tovar reports less feeling of L LE buckling.  Pt inquiring re: transition to cane and when he can perform basement steps w/ U HR to do laundry.  Pt accompanied by partner Kamlesh Tovar reports good compliance with HEP.    Pain:  3-4/10; premedicated w/ oxycodone  Pain location: L Knee       Objective   8/1:   degrees : progress  8/5:  8-118 degrees    Treatments:  Abbreviation Key  NC = not completed this visit   NV = next visit  // = parallel    Assigned HEP- [facility] GACZLEWL     Therapeutic Exercise:  36 minutes    NuStep - 10 min, Seat 13>>>12 , Level 5 - to promote movement throughout bilateral LE to decrease swelling, to improve ROM of bilateral knees  "  PT stairs x 1: reviewed step to pattern, U HR  Gait w/ and w/o SPC in clinic: good gait pattern, mild decrease in balance evident  6\" step up x 10: mild instability as reps progressed  Elevated UNI leg press LLE:  40# 30x  Seated (or stair)  hamstring stretch - 3x15-30s  SLR 10x:  + lag present  LAQs - 2x10  QS: reviewed, 10x:  improved active QS since last visit  Heel slides x 10 then ROM measurement      Neuro Re-education  - Heel Toe Raises with Counter Support  - 1 x daily - 7 x weekly - 2 sets - 10 reps  - full Tandem Stance  - 1 x daily - 7 x weekly - 2 sets - 30 seconds hold  Nv:  progress dynamic balance: upcoming vacation 8/22: needs to walk thru airport if WC not available    Manual Therapy:  7 minutes   Seated STM/retro grade massage L distal quad < med/jt lines L Knee  KT tape web: NE/DISCHARGE     Modalities NC DUE TO IMPROVED QS, CAN RESUME PRN IF QUAD LAG W/ SLR CONTINUES  Electrical Stimulation (unattended)  Long sit w/ towel roll under knee:  NMES (Russian) stim, L VMO 10\"on/10\"off w/ quad setting, 8 min plus set up (intensity 16: good active quad set)    Outpatient Education: Reviewed home exercise program. Home exercise program instructed and issued. Provided manual cues for correct performance of exercises. Provided verbal feedback to improve exercise technique. Rationale for all tx interventions     Robbie Tovar verbalizes understanding of all education provided: Yes    Assessment:  Robbie Tovar completed treatment today which focused upon there ex, manual therapy, pt education in order to address objective deficits s/p L TKA 7/18/25. Pt demo rapid progress in ROM and quad strength L LE since last visit.  Good gait pattern noted in clinic w/ and without use of SPC, however, mildly unsteady.  Decreased quad endurance noted w/ 6\" step ups, feel step to pattern still recommended for safety w/ stair climbing at home till quad strength improves.   Robbie's  response to tx was: Improved joint " mobility.  Patient was appropriately fatigued with no complaints. No change in pain. Robbie's Progress towards goals: Improved gait quality. Improved ability to negotiate stairs. Improved ability to complete household activities. Reduced intensity of pain. Reduced swelling. Robbie Tovar continues to have decreased strength, decreased ROM, and pain limiting participation in household chores, recreational activities, occupational tasks, and exercise. Further skilled therapy services are warranted to address the remaining impairments in order to progress towards functional goals. Robbie left session with all questions answered and no increase in symptoms.      Plan:  Knee extension, fxnl quad strength, dynamic balance: upcoming vacation 8/22: needs to walk thru airport if WC not available  Pt eager to return to gym       Time Calculation  Start Time: 1026  Stop Time: 1115  Time Calculation (min): 49 min     PT Therapeutic Procedures Time Entry  Therapeutic Exercise Time Entry: 36  Neuromuscular Re-Education Time Entry: 6  Manual Therapy Time Entry: 7

## 2025-08-05 ENCOUNTER — TREATMENT (OUTPATIENT)
Dept: PHYSICAL THERAPY | Facility: CLINIC | Age: 70
End: 2025-08-05
Payer: COMMERCIAL

## 2025-08-05 DIAGNOSIS — R29.898 DECREASED STRENGTH OF LOWER EXTREMITY: ICD-10-CM

## 2025-08-05 DIAGNOSIS — M25.662 DECREASED RANGE OF MOTION OF LEFT KNEE: Primary | ICD-10-CM

## 2025-08-05 PROCEDURE — 97110 THERAPEUTIC EXERCISES: CPT | Mod: GP,CQ

## 2025-08-05 PROCEDURE — 97140 MANUAL THERAPY 1/> REGIONS: CPT | Mod: GP,CQ

## 2025-08-06 ENCOUNTER — TELEPHONE (OUTPATIENT)
Dept: ORTHOPEDIC SURGERY | Facility: CLINIC | Age: 70
End: 2025-08-06

## 2025-08-06 DIAGNOSIS — M17.12 PRIMARY OSTEOARTHRITIS OF LEFT KNEE: ICD-10-CM

## 2025-08-06 DIAGNOSIS — Z96.652 S/P LEFT UNICOMPARTMENTAL KNEE REPLACEMENT: ICD-10-CM

## 2025-08-07 ENCOUNTER — TREATMENT (OUTPATIENT)
Dept: PHYSICAL THERAPY | Facility: CLINIC | Age: 70
End: 2025-08-07
Payer: COMMERCIAL

## 2025-08-07 DIAGNOSIS — R29.898 DECREASED STRENGTH OF LOWER EXTREMITY: ICD-10-CM

## 2025-08-07 DIAGNOSIS — M25.662 DECREASED RANGE OF MOTION OF LEFT KNEE: ICD-10-CM

## 2025-08-07 PROCEDURE — 97110 THERAPEUTIC EXERCISES: CPT | Mod: GP,CQ

## 2025-08-07 NOTE — PROGRESS NOTES
"Physical Therapy    Physical Therapy Treatment    Patient Name: Robbie Tovar  MRN: [id number]  Today's Date: 8/7/2025  Time Calculation  Start Time: 1116  Stop Time: 1202  Time Calculation (min): 46 min  Insurance - reviewed   Visit number: 4 (updated 08/07/25)   STILL AWAITING AUTH on 8/7  Payor:  EMPLOYEE MEDICAL PLAN / Plan:  EMPLOYEE MEDICAL PLAN TRADITIONAL  / Product Type: *No Product type* /    $25 COPAY 30 PT/OT V PCY 0 USED NEEDS AUTH CONTIGO PAYS % OOP 1600.00 893.02 APPLIED   Surgeon: Kris Whalen  Surgery: Left Knee Medial Unicompartmental Arthroplasty - Left, 7/18/2025.  Days since surgery: 20       Current Problem  Problem List Items Addressed This Visit           ICD-10-CM    Decreased range of motion of left knee M25.662    Decreased strength of lower extremity R29.898         Precautions   Fall Risk: Moderate   Denies: Cancer, Pacemaker, Diabetes, Hypertension, latex allergy, epilepsy/seizures, other known cardiac problems, other known neurologic problem, other known pulmonary problems, and unexpected weight gain or loss  Past Surgical history: R knee replacement 5 years ago and subsequent EDMAR R knee  L TKA 7/18/25    General  Subjective      Robbie Tovar denies any falls or complications since last session. Robbie Tovar reports he was very sore after last PT session.  Pt is walking w/o walker in home, \"I don't feel confident walking w/o device outdoors\".  Pt needs to improve ability to walk in airport if needed for trip 8/20  Pt accompanied by partner Kamlesh Tovar reports good compliance with HEP.    Pain:  5/10; premedicated w/ oxycodone. After last visit: 6-7/10   Pain location: L Knee, especially posterior      Objective   8/1:   degrees : progress  8/5:  8-118 degrees    Treatments:  Abbreviation Key  NC = not completed this visit   NV = next visit  // = parallel    Assigned HEP- [facility] CATHLEEN     Therapeutic Exercise:  43 minutes    NuStep - 10 min, Seat 11 (difficult " "to move closer than seat 11 due to lack of R knee flexion)  , Level 5 - Legs only for quad strengthening, subjective during   PT stairs x 2: reviewed step to pattern, U HR  Seated (or stair)  hamstring stretch - 3x15-30s  Stair gastroc stretch 30\"x2  SLR 10x:  + lag present  LAQs - 2x10: unable to tolerate resistance 8/7 due to anterior knee discomfort  - Standing Terminal Knee Extension with G Resistance  - 1 x daily - 3-7 x weekly - 2-3 sets - 10 reps  - Seated Hamstring Curl with Y Anchored Resistance  - 1 x daily - 3-7 x weekly - 2-3 sets - 10 reps  -lateral step up/down: unable to isolate quad  Nv: treadmill?  Nv: can resume U leg press     Neuro Re-education   -walking across gym mat w/ SPC inside // bars x 3 min  Nv:  progress dynamic balance w/ SPC: upcoming vacation 8/22: needs to walk thru airport if WC not available    NC  - Heel Toe Raises with Counter Support  - 1 x daily - 7 x weekly - 2 sets - 10 reps  - full Tandem Stance  - 1 x daily - 7 x weekly - 2 sets - 30 seconds hold      Manual Therapy:    minutes NC       Modalities NC     Outpatient Education: Reviewed home exercise program. Home exercise program instructed and issued. Provided manual cues for correct performance of exercises. Provided verbal feedback to improve exercise technique. Rationale for all tx interventions     Robbie Tovar verbalizes understanding of all education provided: Yes    Assessment:  Robbie Tovar completed treatment today which focused upon there ex in order to address objective deficits s/p L TKA 7/18/25. Ex focused on improving L knee extension, L quad strength and gait to prepare for upcoming trip 8/20/25.  Pt demo improved angel, arm swing, and stride length w/ gait in clinic w/o use of device.  Does demo instability in L stance phase of gait due to quad weakness.  Hep updated to address.  Pt mildy unsteady walking on compliant surface w/ SPC.       Robbie's  response to tx was: Mild increase in posterior knee pain, " feel may be due to improved knee extension on inspection post ex.   Patient was appropriately fatigued with no complaints. Robbei's Progress towards goals: Improved gait quality. Improved ability to negotiate stairs. Improved ability to complete household activities. Reduced intensity of pain. Reduced swelling. Robbie Tovar continues to have decreased strength, decreased ROM, and pain limiting participation in household chores, recreational activities, occupational tasks, and exercise. Further skilled therapy services are warranted to address the remaining impairments in order to progress towards functional goals. Robbie left session with all questions answered and no increase in symptoms.      Plan:  Knee extension, fxnl quad strength, dynamic balance: upcoming vacation 8/20: needs to walk thru airport if WC not available  Pt eager to return to gym       Time Calculation  Start Time: 1116  Stop Time: 1202  Time Calculation (min): 46 min     PT Therapeutic Procedures Time Entry  Therapeutic Exercise Time Entry: 43  Neuromuscular Re-Education Time Entry: 3

## 2025-08-08 ENCOUNTER — HOSPITAL ENCOUNTER (OUTPATIENT)
Dept: RADIOLOGY | Facility: CLINIC | Age: 70
Discharge: HOME | End: 2025-08-08
Payer: COMMERCIAL

## 2025-08-08 ENCOUNTER — APPOINTMENT (OUTPATIENT)
Dept: ORTHOPEDIC SURGERY | Facility: CLINIC | Age: 70
End: 2025-08-08
Payer: COMMERCIAL

## 2025-08-08 ENCOUNTER — PHARMACY VISIT (OUTPATIENT)
Dept: PHARMACY | Facility: CLINIC | Age: 70
End: 2025-08-08
Payer: COMMERCIAL

## 2025-08-08 DIAGNOSIS — Z96.652 S/P LEFT UNICOMPARTMENTAL KNEE REPLACEMENT: ICD-10-CM

## 2025-08-08 PROCEDURE — RXMED WILLOW AMBULATORY MEDICATION CHARGE

## 2025-08-08 PROCEDURE — 73562 X-RAY EXAM OF KNEE 3: CPT | Mod: LT

## 2025-08-08 RX ORDER — OXYCODONE HYDROCHLORIDE 5 MG/1
5 TABLET ORAL EVERY 6 HOURS PRN
Qty: 28 TABLET | Refills: 0 | Status: SHIPPED | OUTPATIENT
Start: 2025-08-08 | End: 2025-08-15

## 2025-08-08 NOTE — PROGRESS NOTES
History of Present Illness:  Patient returns today endorsing moderate pain.  Denies any numbness or tingling.  No calf pain, No chest pain or shortness of breath.    Physical Examination:  Mild swelling  Healthy incision, no erythema or drainage  ROM:  5-115  + Plantar/dorsiflexion  Negative Mounika test    Imaging:  Appropriate position and alignment no evidence of implant failure     Assessment  Patient status post left total knee arthroplasty, doing well    Plan:  Discussed analgesics, encouraging non-opioid modalities.  Encouraged ice, rest.  Discussed importance of ROM/ formal physical therapy.  Reviewed dental prophylaxis.  Follow-up in 1 month for a motion check.    In a face to face encounter, I evaluated the patient and performed a physical examination, discussed pertinent diagnostic studies if indicated and discussed diagnosis and management strategies with both the patient and physician assistant / nurse practitioner.  I reviewed the PA/NP's note and agree with the documented findings and plan of care.    I have personally reviewed the [facility] report for this patient. This report is scanned into  the electronic medical record. I have considered the risks of abuse, dependence, addiction, and diversion. They currently report a pain of 8.      Kris Whalen MD

## 2025-08-11 DIAGNOSIS — M54.31 BILATERAL SCIATICA: ICD-10-CM

## 2025-08-11 DIAGNOSIS — M17.12 PRIMARY OSTEOARTHRITIS OF LEFT KNEE: ICD-10-CM

## 2025-08-11 DIAGNOSIS — M54.50 ACUTE BILATERAL LOW BACK PAIN, UNSPECIFIED WHETHER SCIATICA PRESENT: ICD-10-CM

## 2025-08-11 DIAGNOSIS — M54.32 BILATERAL SCIATICA: ICD-10-CM

## 2025-08-11 DIAGNOSIS — Z96.652 S/P LEFT UNICOMPARTMENTAL KNEE REPLACEMENT: ICD-10-CM

## 2025-08-12 ENCOUNTER — TREATMENT (OUTPATIENT)
Dept: PHYSICAL THERAPY | Facility: CLINIC | Age: 70
End: 2025-08-12
Payer: COMMERCIAL

## 2025-08-12 DIAGNOSIS — M25.662 DECREASED RANGE OF MOTION OF LEFT KNEE: Primary | ICD-10-CM

## 2025-08-12 DIAGNOSIS — R29.898 DECREASED STRENGTH OF LOWER EXTREMITY: ICD-10-CM

## 2025-08-12 PROCEDURE — 97112 NEUROMUSCULAR REEDUCATION: CPT | Mod: GP,CQ

## 2025-08-12 PROCEDURE — 97110 THERAPEUTIC EXERCISES: CPT | Mod: GP,CQ

## 2025-08-14 ENCOUNTER — TREATMENT (OUTPATIENT)
Dept: PHYSICAL THERAPY | Facility: CLINIC | Age: 70
End: 2025-08-14
Payer: COMMERCIAL

## 2025-08-14 DIAGNOSIS — M25.662 DECREASED RANGE OF MOTION OF LEFT KNEE: ICD-10-CM

## 2025-08-14 DIAGNOSIS — R29.898 DECREASED STRENGTH OF LOWER EXTREMITY: ICD-10-CM

## 2025-08-14 DIAGNOSIS — M54.50 ACUTE BILATERAL LOW BACK PAIN: Primary | ICD-10-CM

## 2025-08-14 PROCEDURE — 97530 THERAPEUTIC ACTIVITIES: CPT | Mod: GP

## 2025-08-14 PROCEDURE — 97110 THERAPEUTIC EXERCISES: CPT | Mod: GP

## 2025-08-15 ENCOUNTER — APPOINTMENT (OUTPATIENT)
Dept: PHYSICAL THERAPY | Facility: CLINIC | Age: 70
End: 2025-08-15
Payer: COMMERCIAL

## 2025-08-18 ENCOUNTER — APPOINTMENT (OUTPATIENT)
Dept: PAIN MEDICINE | Facility: CLINIC | Age: 70
End: 2025-08-18
Payer: COMMERCIAL

## 2025-08-18 DIAGNOSIS — Z96.652 S/P LEFT UNICOMPARTMENTAL KNEE REPLACEMENT: ICD-10-CM

## 2025-08-18 PROCEDURE — RXMED WILLOW AMBULATORY MEDICATION CHARGE

## 2025-08-18 RX ORDER — OXYCODONE HYDROCHLORIDE 5 MG/1
5 TABLET ORAL EVERY 6 HOURS PRN
Qty: 28 TABLET | Refills: 0 | Status: SHIPPED | OUTPATIENT
Start: 2025-08-18 | End: 2025-08-26

## 2025-08-19 ENCOUNTER — PHARMACY VISIT (OUTPATIENT)
Dept: PHARMACY | Facility: CLINIC | Age: 70
End: 2025-08-19
Payer: COMMERCIAL

## 2025-08-19 ENCOUNTER — APPOINTMENT (OUTPATIENT)
Dept: PHYSICAL THERAPY | Facility: CLINIC | Age: 70
End: 2025-08-19
Payer: COMMERCIAL

## 2025-08-19 ENCOUNTER — DOCUMENTATION (OUTPATIENT)
Dept: PHYSICAL THERAPY | Facility: CLINIC | Age: 70
End: 2025-08-19
Payer: COMMERCIAL

## 2025-08-22 ENCOUNTER — APPOINTMENT (OUTPATIENT)
Dept: PHYSICAL THERAPY | Facility: CLINIC | Age: 70
End: 2025-08-22
Payer: COMMERCIAL

## 2025-08-25 PROCEDURE — RXMED WILLOW AMBULATORY MEDICATION CHARGE

## 2025-08-26 PROCEDURE — RXMED WILLOW AMBULATORY MEDICATION CHARGE

## 2025-08-26 RX ORDER — OXYCODONE AND ACETAMINOPHEN 5; 325 MG/1; MG/1
1 TABLET ORAL EVERY 6 HOURS PRN
Qty: 28 TABLET | Refills: 0 | Status: SHIPPED | OUTPATIENT
Start: 2025-08-26 | End: 2025-09-03

## 2025-08-27 ENCOUNTER — PHARMACY VISIT (OUTPATIENT)
Dept: PHARMACY | Facility: CLINIC | Age: 70
End: 2025-08-27
Payer: COMMERCIAL

## 2025-08-29 ENCOUNTER — TREATMENT (OUTPATIENT)
Dept: PHYSICAL THERAPY | Facility: CLINIC | Age: 70
End: 2025-08-29
Payer: COMMERCIAL

## 2025-08-29 DIAGNOSIS — R29.898 DECREASED STRENGTH OF LOWER EXTREMITY: ICD-10-CM

## 2025-08-29 DIAGNOSIS — M25.662 DECREASED RANGE OF MOTION OF LEFT KNEE: ICD-10-CM

## 2025-08-29 DIAGNOSIS — M54.50 ACUTE BILATERAL LOW BACK PAIN: ICD-10-CM

## 2025-08-29 PROCEDURE — 97110 THERAPEUTIC EXERCISES: CPT | Mod: GP

## 2025-09-12 ENCOUNTER — APPOINTMENT (OUTPATIENT)
Dept: ORTHOPEDIC SURGERY | Facility: CLINIC | Age: 70
End: 2025-09-12
Payer: COMMERCIAL

## 2025-10-07 ENCOUNTER — APPOINTMENT (OUTPATIENT)
Dept: OPHTHALMOLOGY | Facility: CLINIC | Age: 70
End: 2025-10-07
Payer: COMMERCIAL

## 2025-10-20 ENCOUNTER — APPOINTMENT (OUTPATIENT)
Dept: OPHTHALMOLOGY | Facility: CLINIC | Age: 70
End: 2025-10-20
Payer: COMMERCIAL

## 2025-11-12 ENCOUNTER — APPOINTMENT (OUTPATIENT)
Dept: OPHTHALMOLOGY | Facility: CLINIC | Age: 70
End: 2025-11-12
Payer: COMMERCIAL

## 2026-04-14 ENCOUNTER — APPOINTMENT (OUTPATIENT)
Dept: OPHTHALMOLOGY | Facility: CLINIC | Age: 71
End: 2026-04-14
Payer: COMMERCIAL

## (undated) DEVICE — GOWN, SURGICAL, NON-REINFORCED, XLARGE, LF

## (undated) DEVICE — SUTURE, VICRYL PLUS, 1, 8X27IN, CT-1, CR, UND, BRAIDED

## (undated) DEVICE — SUTURE, STRATAFIX, 1 SYMMETRIC, PDS PLUS, 60CM, CTX, VIOLET

## (undated) DEVICE — HIGH FLOW TIP FOR INTERPULSE HANDPIECE SET

## (undated) DEVICE — SUTURE, MONOCRYL, 3-0, PS-1 27IN, UNDYED

## (undated) DEVICE — DRAPE, INSTRUMENT, W/POUCH, STERI DRAPE, 7 X 11 IN, DISPOSABLE, STERILE

## (undated) DEVICE — GLOVE, SURGICAL, PROTEXIS PI W/NEU-THERA, 8.0, PF, LF

## (undated) DEVICE — MIXER, CEMENT, MIXEVAC III HIGH VACUUM KIT, STERILE

## (undated) DEVICE — NEEDLE, HYPODERMIC, SPECIALTY, REGULAR WALL, SHORT BEVEL, 18 G X 1.5 IN

## (undated) DEVICE — TUBE, RIO SYSTEM IRRIGATION

## (undated) DEVICE — SOLUTION, IRRIGATION, USP, SODIUM CHLORIDE 0.9%, 3000 ML, BAG

## (undated) DEVICE — TOWEL PACK, STERILE, 4/PACK, BLUE

## (undated) DEVICE — PAD, KNEE PATIENT, DEMAYO, DISP, STERILE

## (undated) DEVICE — TIP, SUCTION, YANKAUER, FLEXIBLE

## (undated) DEVICE — ADHESIVE, SKIN, DERMABOND ADVANCED, 15CM, PEN-STYLE

## (undated) DEVICE — SUTURE, STARTAFIX, SYMMETRIC, PDS PLUS, 60CM CT-1

## (undated) DEVICE — DRAPE KIT, RIO

## (undated) DEVICE — SUTURE, VICRYL, 2-0, 36 IN, CT-1, UNDYED

## (undated) DEVICE — GLOVE, SURGICAL, PROTEXIS PI , 7.5, PF, LF

## (undated) DEVICE — WOUND SYSTEM, DEBRIDEMENT & CLEANING, O.R DUOPAK

## (undated) DEVICE — Device

## (undated) DEVICE — PIN, BONE, 4MM X 140MM, STERILE

## (undated) DEVICE — DRESSING, MEPILEX BORDER, POST-OP AG, 4 X 10 IN

## (undated) DEVICE — BANDAGE, COMPRESSION, W/CLIP, FLEX-MASTER, DOUBLE LENGTH, 6 IN X 11 YD, LF

## (undated) DEVICE — BLADE, MAKO, SAGITTAL, NARROW

## (undated) DEVICE — GLOVE, SURGICAL, PROTEXIS PI BLUE W/NEUTHERA, 7.5, PF, LF

## (undated) DEVICE — SOLUTION, IRRIGATION, STERILE WATER, 1000 ML, POUR BOTTLE

## (undated) DEVICE — SUTURE, VICRYL, 18 IN, CT-1, UNDYED

## (undated) DEVICE — PIN, BONE, 4MM X 110MM, STERILE

## (undated) DEVICE — SYRINGE, 60 CC, LUER LOCK, MONOJECT, W/O CAP, LF

## (undated) DEVICE — CHECKPOINT KIT, FEMORAL/ TIBIAL

## (undated) DEVICE — MARKER, SKIN, DUAL TIP, W/RULER AND LABEL

## (undated) DEVICE — CLIP, MICS IRRIGATION

## (undated) DEVICE — BLADE, STRYKER, OSC, 19 X 90 X 1.27G

## (undated) DEVICE — TRACKING KIT, TM KNEE PROCEDURES, VIZADISC

## (undated) DEVICE — CUFF, TOURNIQUET, DUAL PORT, SNG BLADDER, 30 IN, PLC